# Patient Record
Sex: MALE | Race: BLACK OR AFRICAN AMERICAN | NOT HISPANIC OR LATINO | Employment: OTHER | URBAN - METROPOLITAN AREA
[De-identification: names, ages, dates, MRNs, and addresses within clinical notes are randomized per-mention and may not be internally consistent; named-entity substitution may affect disease eponyms.]

---

## 2018-04-29 ENCOUNTER — HOSPITAL ENCOUNTER (OUTPATIENT)
Dept: MEDSURG UNIT | Facility: HOSPITAL | Age: 48
End: 2018-04-30
Attending: INTERNAL MEDICINE | Admitting: INTERNAL MEDICINE

## 2018-04-29 LAB
% SATURATION: 99.6 % (ref 68–77)
ABS NEUT (OLG): 4.47 X10(3)/MCL (ref 2.1–9.2)
ALBUMIN SERPL-MCNC: 3.6 GM/DL (ref 3.4–5)
ALBUMIN/GLOB SERPL: 1 {RATIO}
ALP SERPL-CCNC: 150 UNIT/L (ref 50–136)
ALT SERPL-CCNC: 18 UNIT/L (ref 12–78)
APPEARANCE, UA: CLEAR
AST SERPL-CCNC: 17 UNIT/L (ref 15–37)
BACTERIA SPEC CULT: ABNORMAL /HPF
BASOPHILS # BLD AUTO: 0 X10(3)/MCL (ref 0–0.2)
BASOPHILS NFR BLD AUTO: 0 %
BILIRUB SERPL-MCNC: 0.5 MG/DL (ref 0.2–1)
BILIRUB UR QL STRIP: NEGATIVE
BILIRUBIN DIRECT+TOT PNL SERPL-MCNC: 0.1 MG/DL (ref 0–0.5)
BILIRUBIN DIRECT+TOT PNL SERPL-MCNC: 0.4 MG/DL (ref 0–0.8)
BNP BLD-MCNC: <5 PG/ML (ref 0–125)
BUN SERPL-MCNC: 7 MG/DL (ref 7–18)
CALCIUM SERPL-MCNC: 8.6 MG/DL (ref 8.5–10.1)
CHLORIDE SERPL-SCNC: 103 MMOL/L (ref 98–107)
CK MB SERPL-MCNC: 1.3 NG/ML (ref 0.5–3.6)
CK SERPL-CCNC: 273 UNIT/L (ref 39–308)
CO HGB VEN: 7.1 GM/DL
CO2 SERPL-SCNC: 22 MMOL/L (ref 21–32)
COLOR UR: YELLOW
CREAT SERPL-MCNC: 1.41 MG/DL (ref 0.7–1.3)
D BASE VENOUS: -6.8 (ref -2–3)
EOSINOPHIL # BLD AUTO: 0.1 X10(3)/MCL (ref 0–0.9)
EOSINOPHIL NFR BLD AUTO: 2 %
ERYTHROCYTE [DISTWIDTH] IN BLOOD BY AUTOMATED COUNT: 13.2 % (ref 11.5–17)
GLOBULIN SER-MCNC: 3.7 GM/DL (ref 2.4–3.5)
GLUCOSE (UA): ABNORMAL
GLUCOSE SERPL-MCNC: 641 MG/DL (ref 74–106)
HCO3 VENOUS: 17.5 MEQ/L (ref 22–26)
HCT VFR BLD AUTO: 46.4 % (ref 42–52)
HGB BLD-MCNC: 14.7 GM/DL (ref 14–18)
HGB UR QL STRIP: NEGATIVE
KETONES UR QL STRIP: ABNORMAL
LEUKOCYTE ESTERASE UR QL STRIP: NEGATIVE
LYMPHOCYTES # BLD AUTO: 1.2 X10(3)/MCL (ref 0.6–4.6)
LYMPHOCYTES NFR BLD AUTO: 19 %
Lab: ABNORMAL
MCH RBC QN AUTO: 30.9 PG (ref 27–31)
MCHC RBC AUTO-ENTMCNC: 31.7 GM/DL (ref 33–36)
MCV RBC AUTO: 97.5 FL (ref 80–94)
MET HGB VEN: 1.1 % (ref 0.4–1.5)
MONOCYTES # BLD AUTO: 0.6 X10(3)/MCL (ref 0.1–1.3)
MONOCYTES NFR BLD AUTO: 9 %
NEUTROPHILS # BLD AUTO: 4.47 X10(3)/MCL (ref 1.4–7.9)
NEUTROPHILS NFR BLD AUTO: 69 %
NITRITE UR QL STRIP: NEGATIVE
O2 HGB VENOUS: 90.3 % (ref 40–70)
PCO2 VENOUS: 31 MMHG (ref 35–45)
PH UR STRIP: 5.5 [PH] (ref 5–9)
PH VENOUS: 7.36 (ref 7.35–7.45)
PLATELET # BLD AUTO: 234 X10(3)/MCL (ref 130–400)
PMV BLD AUTO: 10.4 FL (ref 9.4–12.4)
PO2 VENOUS: 181 MMHG (ref 30–45)
POTASSIUM SERPL-SCNC: 3.8 MMOL/L (ref 3.5–5.1)
PROT SERPL-MCNC: 7.3 GM/DL (ref 6.4–8.2)
PROT UR QL STRIP: NEGATIVE
RBC # BLD AUTO: 4.76 X10(6)/MCL (ref 4.7–6.1)
RBC #/AREA URNS HPF: ABNORMAL /[HPF]
SAMPLE VEN: ABNORMAL
SITE VEN: ABNORMAL
SODIUM SERPL-SCNC: 135 MMOL/L (ref 136–145)
SP GR UR STRIP: 1.04 (ref 1–1.03)
SQUAMOUS EPITHELIAL, UA: ABNORMAL
THB VEN: 14 GM/DL (ref 13.5–18)
TREATMENT VEN: ABNORMAL
TROPONIN I SERPL-MCNC: <0.02 NG/ML (ref 0.02–0.49)
UROBILINOGEN UR STRIP-ACNC: 1
WBC # SPEC AUTO: 6.5 X10(3)/MCL (ref 4.5–11.5)
WBC #/AREA URNS HPF: ABNORMAL /HPF

## 2018-04-30 LAB
BUN SERPL-MCNC: 7 MG/DL (ref 7–18)
CALCIUM SERPL-MCNC: 8.2 MG/DL (ref 8.5–10.1)
CHLORIDE SERPL-SCNC: 115 MMOL/L (ref 98–107)
CHOLEST SERPL-MCNC: 255 MG/DL (ref 0–200)
CHOLEST/HDLC SERPL: 9.1 {RATIO} (ref 0–5)
CK MB SERPL-MCNC: 1 NG/ML (ref 0.5–3.6)
CK MB SERPL-MCNC: 1.2 NG/ML (ref 0.5–3.6)
CK SERPL-CCNC: 193 UNIT/L (ref 39–308)
CK SERPL-CCNC: 219 UNIT/L (ref 39–308)
CO2 SERPL-SCNC: 23 MMOL/L (ref 21–32)
CREAT SERPL-MCNC: 0.73 MG/DL (ref 0.7–1.3)
EST. AVERAGE GLUCOSE BLD GHB EST-MCNC: 298 MG/DL
EST. AVERAGE GLUCOSE BLD GHB EST-MCNC: 321 MG/DL
GLUCOSE SERPL-MCNC: 191 MG/DL (ref 74–106)
HBA1C MFR BLD: 12 % (ref 4.2–6.3)
HBA1C MFR BLD: 12.8 % (ref 4.2–6.3)
HDLC SERPL-MCNC: 28 MG/DL (ref 35–60)
LDLC SERPL CALC-MCNC: 200 MG/DL (ref 0–129)
POTASSIUM SERPL-SCNC: 3.3 MMOL/L (ref 3.5–5.1)
SODIUM SERPL-SCNC: 145 MMOL/L (ref 136–145)
TRIGL SERPL-MCNC: 133 MG/DL (ref 30–150)
TROPONIN I SERPL-MCNC: <0.02 NG/ML (ref 0.02–0.49)
TROPONIN I SERPL-MCNC: <0.02 NG/ML (ref 0.02–0.49)
VLDLC SERPL CALC-MCNC: 27 MG/DL

## 2020-01-23 ENCOUNTER — HISTORICAL (OUTPATIENT)
Dept: ADMINISTRATIVE | Facility: HOSPITAL | Age: 50
End: 2020-01-23

## 2021-04-05 ENCOUNTER — HISTORICAL (OUTPATIENT)
Dept: ADMINISTRATIVE | Facility: HOSPITAL | Age: 51
End: 2021-04-05

## 2021-04-28 ENCOUNTER — HISTORICAL (OUTPATIENT)
Dept: ADMINISTRATIVE | Facility: HOSPITAL | Age: 51
End: 2021-04-28

## 2021-04-28 LAB
APTT PPP: 30.2 SECOND(S) (ref 23.2–33.7)
INR PPP: 1 (ref 0–1.3)
PROTHROMBIN TIME: 12.8 SECOND(S) (ref 11.1–13.7)

## 2022-04-30 NOTE — DISCHARGE SUMMARY
Patient:   Cam Pang            MRN: [id number]            FIN: [id number]               Age:   48 years     Sex:  Male     :  1970   Associated Diagnoses:   Diabetes type 2, uncontrolled; HLD (hyperlipidemia); HTN (hypertension); Hyperglycemia; Non-compliance; Chest pain; Hypertension   Author:   Paulina Saleem MD      Results Review   General results   Most recent results   Discrete results only   2018 14:11 CDT      Total CK                  193 unit/L                             CK MB                     1.0 ng/mL                             Troponin-I                <0.02 ng/mL    2018 11:30 CDT      Blood Glucose, POC        226 mg/dL  HI    2018 11:24 CDT      POC CBG                   226 mg/dL  HI    2018 11:00 CDT      Blood Glucose Testing Reason              Routine                             Blood Glucose Stick Site  Finger                             Blood Glucose Interventions               Administered agent to decrease blood sugar                             Blood Glucose Source      Capillary    2018 5:45 CDT       Sodium Lvl                145 mmol/L                             Potassium Lvl             3.3 mmol/L  LOW                             Chloride                  115 mmol/L  HI                             CO2                       23.0 mmol/L                             Calcium Lvl               8.2 mg/dL  LOW                             Glucose Lvl               191 mg/dL  HI                             EAG                       321 mg/dL  NA                             BUN                       7.0 mg/dL                             Creatinine                0.73 mg/dL                             eGFR-AA                   >60 mL/min/1.73 m2  NA                             eGFR-DEL                  >60 mL/min/1.73 m2  NA                             Hgb A1c                   12.8 %  HI                             Chol                       255 mg/dL  HI                             HDL                       28 mg/dL  LOW                             Trig                      133 mg/dL                             LDL                       200 mg/dL  HI                             Chol/HDL                  9.1  HI                             VLDL                      27 mg/dL  NA                             Total CK                  219 unit/L                             CK MB                     1.2 ng/mL                             Troponin-I                <0.02 ng/mL    4/30/2018 5:27 CDT       Blood Glucose, POC        466 mg/dL  HI    4/30/2018 1:07 CDT       Blood Glucose, POC        314 mg/dL  HI    4/30/2018 0:03 CDT       EAG                       298 mg/dL  NA                             Hgb A1c                   12.0 %  HI           Discharge Information      Discharge Summary Information   Admitted  4/29/2018   Discharged  4/30/2018   Admitting physician     Ortiz HARPER, Justin RICE     Discharge diagnosis     Diabetes type 2, uncontrolled (WWY68-IE E11.65).     HLD (hyperlipidemia) (VIK18-ZI E78.5).     HTN (hypertension) (SJA68-AU I10).     Hyperglycemia (PNED [id number]).     Non-compliance (HGM38-WC Z91.19).        Physical Examination   Vital Signs   4/30/2018 14:29 CDT      Temperature Oral          36.7 DegC                             Temperature Oral (calculated)             98.06 DegF                             Peripheral Pulse Rate     84 bpm                             Heart Rate Monitored      114 bpm  HI                             Respiratory Rate          20 br/min                             SpO2                      95 %                             Oxygen Therapy            Room air                             Systolic Blood Pressure   148 mmHg  HI                             Diastolic Blood Pressure  87 mmHg                             Mean Arterial Pressure, Cuff              107 mmHg     General:   Alert and oriented, No acute distress.    Eye:  Pupils are equal, round and reactive to light.    HENT:  Normocephalic.    Neck:  Supple, Non-tender.    Respiratory:  Lungs are clear to auscultation, Respirations are non-labored.    Cardiovascular:  Normal rate, Regular rhythm.    Gastrointestinal:  Soft, Non-tender.    Musculoskeletal:  Normal range of motion, Normal strength.    Integumentary:  Warm.    Neurologic:  Alert, Oriented, No focal deficits.       Hospital Course   Hospital Course   Admitted from: from emergency department.     Admitting diagnosis: Chest pain (GQC66-QL R07.9), Diabetes type 2, uncontrolled (NSQ29-BR E11.65), Hyperglycemia (PNED [id number]), Hypertension (ICJ57-EC I10), Non-compliance (QFS45-LZ Z91.19).     Admission disposition: admit to monitored bed.     Length of stay: days  1.     Medical management.     38-year-old male with significant past medical history his HLD, HTN, uncontrolled type II diabetes mellitus, generalized anxiety admitted to hospitalist service on 4/29/2018 with high CBG, more than 500.  Patient reportedly was having blurry vision and headache which is common when his blood sugar was very high.  Patient reports being noncompliant and he was recently released from a psychiatric facility.  He does have history of schizophrenia.  Patient admitted for hyperglycemia/dehydration.  Initiated on IV fluids, insulin sliding scale and Lantus.  Medications optimized accordingly for high blood pressure and high cholesterol.Patient symptomatically improved.  His chest pain resolved.  Troponins remained negative.  CBGs improved with Lantus and sliding scale.  Since hemodynamically and symptomatically stable it was decided to discharge the patient home.  No other acute events overnight.  Hemoglobin A1c came back elevated at 12.8.  Patient is currently on metformin.  Will benefit from long-acting and short-acting insulin.  Initiated on Lantus 16 units at  bedtime and advised to increase by 2 units every day until CBGs consistently less than 110.  Insulin lispro to cover for meals 3 times a day.  To be taken 8 units 3 times a day.  Coreg/lisinopril for high blood pressure.  Lipitor for high cholesterol.  Continue psych meds.  She will need an outpatient follow-up with cardiology for outpatient stress test given his risk factors and atypical chest pain.  Appointment set up for the same.  The patient does not have a PCP.  Case management was consulted for the same.  PCP arranged.  All prescriptions given to the patient.  All treatment plans discussed with the patient.  Diabetic education/insulin administration education done.  He voiced understanding to everything explained.  He was scheduled follow-up appointments with PCP and cardiology         Discharge Plan   Discharge Summary Plan   Discharge Status: improved.     Discharge instructions given: to patient.     Discharge disposition: discharge to home.     Prescriptions: continue same medications, reviewed with patient, written and given to patient.     Course   Improving.     Progressing as expected.     Well controlled.     Education and Follow-up   Counseled: patient.     Discharge Planning: Cholesterol, Easy-to-Read, Hypertension, Easy-to-Read, Insulin Treatment for Diabetes, Correction Insulin, Diabetic Ketoacidosis, Diabetes Mellitus and Food, Diabetes Mellitus and Exercise, Blood Glucose Monitoring, Adult, Report to Emergency Department if symptoms return or worsen; CIS in 2 weeks for OP Stress test /Ischemic Eval 5/15/2018 01:00:00; Dr. Teresa 5/14/2018 11:00:00 (ph [phone]) Your appointment is Monday May 14, 2018 at 11:00 AM. Call if you need to reschedule or have any questions..     DC Time was 36 mnts

## 2022-04-30 NOTE — ED PROVIDER NOTES
"   Patient:   Cam Pang            MRN: [id number]            FIN: [id number]               Age:   48 years     Sex:  Male     :  1970   Associated Diagnoses:   Chest pain; Hypertension; Hyperglycemia   Author:   Jonathan HARPER, Radha Padilla      Basic Information   Time seen: Date & time 2018 16:11:00.   History source: Patient.   Arrival mode: Private vehicle.   History limitation: None.      History of Present Illness   The patient presents with 47 y/o male presents with hyperglycemia, polyuria, polydipsia. not taking metformin x 1 month. karina bob and     I, Dr. Castillo, assumed care of this patient at 1656.    47 y/o AAM with a history of DM, HTN, migraines and back pain, presents to the ED with complaints of hyperglycemia, polyuria, polydipsia and lower back pain, for the past month, constipation for 4 days, with chest tightness and "sticking" chest pain, a headache and SOB, today. Pt says he moved from [place], does not have a PCP in [place] and has not taken Metformin or BP medications for one month. He reports that his CBG while taking Metformin was typically 300 to 400, and his CBG today was 585. He states that his chest tightness and sticking pain occurs in 20 second episodes and is worse on exertion. Pt says he has been constipated, but is still able to pass gas. He denies any fever or cough. No changes in speech, strength or sensation.  The onset was 4  weeks ago.  The course/duration of symptoms is constant.  The blood sugar was 585 mg/dL, today and method of detection: home glucometer.  Risk factors consist of diabetes mellitus, non-compliance and hypertension.  Prior episodes: frequent.  Therapy today: none.  Associated symptoms: polyuria, polydipsia, headache and denies fever.        Review of Systems   Constitutional symptoms:  No fever,    Skin symptoms:  No rash,    Eye symptoms:  No recent vision problems,    ENMT symptoms:  No sore throat,    Respiratory " "symptoms:  Shortness of breath, No cough,    Cardiovascular symptoms:  Chest pain, diffuse, tightness, "sticking", No syncope,    Gastrointestinal symptoms:  Constipation, no abdominal pain, no nausea, no vomiting, no diarrhea.    Genitourinary symptoms:  No dysuria, no hematuria.    Musculoskeletal symptoms:  Back pain (left lower, not acute).   Neurologic symptoms:  Headache, no altered level of consciousness, no numbness, no tingling, no weakness.    Endocrine symptoms:  Polyuria, polydipsia, hyperglycemia.       Health Status   Allergies:    Allergic Reactions (Selected)  Severity Not Documented  Demerol HCl- No reactions were documented..   Medications:  (Selected)   Inpatient Medications  Ordered  Normal Saline Infusion 1,000 mL: 1,000 mL, 1,000 mL, IV, 1,000 mL/hr, start date 04/29/18 16:12:00 CDT  Normal Saline Infusion 1,000 mL: 1,000 mL, 1,000 mL, IV, 1,000 mL/hr, start date 04/29/18 16:24:00 CDT  Documented Medications  Documented  aspirin 81 mg oral Delayed Release (EC) tablet: 81 mg = 1 tab(s), Oral, Daily, # 30 tab(s), 0 Refill(s)  atorvastatin 40 mg oral tablet: 40 mg = 1 tab(s), Oral, Daily, # 30 tab(s), 0 Refill(s)  carvedilol 3.125 mg oral tablet: 3.125 mg = 1 tab(s), Oral, BID, # 60 tab(s), 0 Refill(s)  cloZAPine 100 mg oral tablet: 100 mg = 1 tab(s), Oral, At Bedtime, # 21 tab(s), 0 Refill(s)  clozapine 50 mg oral tablet: 50 mg = 1 tab(s), Oral, Daily, # 30 tab(s), 0 Refill(s)  lisinopril 20 mg oral tablet: 20 mg = 1 tab(s), Oral, Daily, # 30 tab(s), 0 Refill(s)  metformin 500 mg oral tablet: 500 mg = 1 tab(s), Oral, BID, with meals, # 180 tab(s), 0 Refill(s)  traZODone 100 mg oral tablet: Oral, At Bedtime, 0 Refill(s).      Past Medical/ Family/ Social History   Medical history:    Active  Back pain (1404128762)  Depression (122669934)  Diabetes ([id number])  Resolved  Arthritis ([id number]):  Resolved.  Bipolar disorder (79981524):  " Resolved.  Difficulty urinating ([id number]):  Resolved.  Hypercholesteremia (41705O8V-[id number]-D96I-03L0V1A86A81):  Resolved.  Hypertension (ZQ98G0M9--W5Q3-W3SR7AQ89G24):  Resolved.  Migraines ([id number]):  Resolved.  Reflux ([id number]):  Resolved.  Sciatica (53497299):  Resolved.  Sleep apnea (462771841):  Resolved.  Comments:  4/28/2015 CDT 11:11 CDT - Jocelin RANGEL, Dominique Carie  does not use machine  Smoker (116958082):  Resolved..   Surgical history:    Left Heart Cath w/COR Angio Ventriculography (None) performed by Ken HARPER, Hiren MONTGOMERY on 4/29/2015 at 45 Years.  Comments:  4/29/2015 09:21 - Riri RANGEL, Devante BASS  auto-populated from documented surgical case  Appendectomy; (CPT4 39215).  facial surgery.  repair of left arm fracture..   Family history:    No family history items have been selected or recorded..   Social history: Alcohol use: Occasionally, Tobacco use: Regularly.      Physical Examination               Vital Signs   Vital Signs   4/29/2018 17:38 CDT      Peripheral Pulse Rate     98 bpm                             Respiratory Rate          18 br/min                             Oxygen Therapy            Room air                             Systolic Blood Pressure   124 mmHg                             Diastolic Blood Pressure  71 mmHg                             Mean Arterial Pressure, Cuff              89 mmHg    4/29/2018 16:09 CDT      Temperature Oral          36.6 DegC                             Temperature Oral (calculated)             97.88 DegF                             Peripheral Pulse Rate     112 bpm  HI                             Respiratory Rate          18 br/min                             SpO2                      91 %  LOW                             Oxygen Therapy            Room air                             Systolic Blood Pressure   141 mmHg  HI                             Diastolic Blood  Pressure  84 mmHg  .   Measurements   4/29/2018 16:09 CDT      Weight Dosing             122 kg                             Weight Measured and Calculated in Lbs     268.96 lb                             Weight Estimated          122 kg                             Height/Length Dosing      185 cm                             Height/Length Estimated   185 cm                             Body Mass Index Estimated 35.65 kg/m2  .   Basic Oxygen Information   4/29/2018 17:38 CDT      Oxygen Therapy            Room air    4/29/2018 16:09 CDT      SpO2                      91 %  LOW                             Oxygen Therapy            Room air  .   General:  Alert, no acute distress.    Skin:  Warm, dry, intact, no pallor, no rash, normal for ethnicity.    Head:  Normocephalic, atraumatic.    Neck:  Supple, trachea midline.    Eye:  Extraocular movements are intact, normal conjunctiva.    Ears, nose, mouth and throat:  dry mucous membranes.   Cardiovascular:  Regular rate and rhythm, Normal peripheral perfusion, No edema.    Respiratory:  Lungs are clear to auscultation, respirations are non-labored, breath sounds are equal, Symmetrical chest wall expansion.    Gastrointestinal:  Soft, Nontender, Normal bowel sounds, Protuberant abdomen.    Back:  Normal range of motion, No costovertebral angle tenderness,    Musculoskeletal:  Normal ROM, No calf edema, asymmetry, erythema, warmth, tenderness to palpation or palpable cords appreciated bilaterally.    Neurological:  Alert and oriented to person, place, time, and situation, No focal neurological deficit observed, CN II-XII intact, normal sensory observed, normal motor observed, normal speech observed.    Psychiatric:  Cooperative.      Medical Decision Making   Differential Diagnosis::  Diabetes, diabetic ketoacidosis, hyperglycemia, ACS, and others.    Rationale:  48-year-old obese male with diabetes, hypertension, hyperlipidemia, noncompliant with medications, presents for  evaluation of polyuria, polydipsia and elevated blood glucose.  He has not had his medications for one month and does not have a PCP.  Initially hypertensive and tachycardic, IV fluids initiated.  Labs including urinalysis obtained as well.  I added VBG and screening cardiac workup as he is reporting intermittent chest pain with shortness of breath.  Currently he is chest pain-free.  EKG sinus rhythm without acute ischemic changes from previous tracings.  Reassess.   Documents reviewed:  Emergency department nurses' notes.   Orders  Launch Orders   Laboratory:  UA Total a reflex to culture (Order): Stat collect, Urine, 4/29/2018 16:12 CDT, Nurse collect, Print Label By Order Location  CMP (Order): Stat collect, 4/29/2018 16:12 CDT, Blood, Lab Collect, Print Label By Order Location, 4/29/2018 16:12 CDT  CBC w/ Auto Diff (Order): Now collect, 4/29/2018 16:12 CDT, Blood, Lab Collect, Print Label By Order Location, 4/29/2018 16:12 CDT  Pharmacy:  Normal Saline Infusion 1000 mL (Order): 1,000 mL, 1,000 mL, IV, 1,000 mL/hr, start date 4/29/2018 16:12 CDT, Launch Orders   Pharmacy:  Normal Saline Infusion 1000 mL (Order): 1,000 mL, 1,000 mL, IV, 1,000 mL/hr, start date 4/29/2018 16:24 CDT.    Electrocardiogram:  Time 4/29/2018 17:43:00, rate 97, normal sinus rhythm, no ectopy, normal MS & QRS intervals, EP Interp, T wave Inversion, III, , AVF.    Results review:  Lab results : Lab View   4/29/2018 17:30 CDT      Sample Daniel                venous                             Treatment Daniel             room air                             Site Daniel                  Venous                             pH Daniel                    7.360                             pO2 Dainel                   181.0 mmHg  HI                             pCO2 Daniel                  31.0 mmHg  LOW                             HCO3 Daniel                  17.5 mEq/L  LOW                             CO2 Totl Daniel              18.5 mEq/L  LOW                              D Base Daniel                -6.8  LOW                             % Sat Daniel                 99.6 %  HI                             THB Daniel                   14.0 gm/dL                             CO Hgb Daniel                7.1 gm/dL  NA                             Met Hgb Daniel               1.1 %                             O2 Hgb Daniel                90.3 %  HI                             Ion Ca Daniel                0.97 mmol/L  LOW                             Sodium Daniel                134 mmol/L  LOW                             Potassium Daniel             3.50 mmol/L  LOW                             Setting 1 Daniel             room air    4/29/2018 16:38 CDT      Sodium Lvl                135 mmol/L  LOW                             Potassium Lvl             3.8 mmol/L                             Chloride                  103 mmol/L                             CO2                       22.0 mmol/L                             Calcium Lvl               8.6 mg/dL                             Glucose Lvl               641 mg/dL  CRIT                             BUN                       7.0 mg/dL                             Creatinine                1.41 mg/dL  HI                             eGFR-AA                   >60 mL/min/1.73 m2  NA                             eGFR-DEL                  57 mL/min/1.73 m2  NA                             Bili Total                0.5 mg/dL                             Bili Direct               0.10 mg/dL                             Bili Indirect             0.40 mg/dL                             AST                       17 unit/L                             ALT                       18 unit/L                             Alk Phos                  150 unit/L  HI                             Total Protein             7.3 gm/dL                             Albumin Lvl               3.60 gm/dL                             Globulin                  3.70 gm/dL  HI                             A/G Ratio                  1.0  NA                             Troponin-I                <0.02 ng/mL                             WBC                       6.5 x10(3)/mcL                             RBC                       4.76 x10(6)/mcL                             Hgb                       14.7 gm/dL                             Hct                       46.4 %                             Platelet                  234 x10(3)/mcL                             MCV                       97.5 fL  HI                             MCH                       30.9 pg                             MCHC                      31.7 gm/dL  LOW                             RDW                       13.2 %                             MPV                       10.4 fL                             Abs Neut                  4.47 x10(3)/mcL                             Neutro Auto               69 %  NA                             Lymph Auto                19 %  NA                             Mono Auto                 9 %  NA                             Eos Auto                  2 %  NA                             Abs Eos                   0.1 x10(3)/mcL                             Basophil Auto             0 %  NA                             Abs Neutro                4.47 x10(3)/mcL                             Abs Lymph                 1.2 x10(3)/mcL                             Abs Mono                  0.6 x10(3)/mcL                             Abs Baso                  0.0 x10(3)/mcL    4/29/2018 16:25 CDT      UA Appear                 CLEAR                             UA Color                  YELLOW                             UA Spec Grav              1.038  HI                             UA Bili                   Negative                             UA pH                     5.5                             UA Urobilinogen           1.0                             UA Blood                  Negative                             UA Glucose                 3+                             UA Ketones                2+                             UA Protein                Negative                             UA Nitrite                Negative                             UA Leuk Est               Negative                             UA WBC                    NONE SEEN /HPF                             UA RBC                    NONE SEEN                             UA Bacteria               NONE SEEN /HPF                             UA Squam Epithelial       NONE SEEN  .   Chest X-Ray:  No acute disease process, interpretation by Emergency Physician.       Reexamination/ Reevaluation   Time: 4/29/2018 19:20:00 .   Course: improving.   Assessment: exam improved.   Notes: Patient's heart rate has improved with IV fluids.  He is still complaining of intermittent chest pressure.  Again he reports shortness of breath however this is not acute in nature.  He also has a headache with blurred vision at this time.  Headache described as diffuse pressure.  He admits he has not taken his blood pressure medication in over a month as he is out of this prescription as well. He was taking lisinopril.  Funduscopic exam within normal limits without obvious papilledema or retinal hemorrhage on limited exam.  Cranial nerves and neurologic exam normal.  I believe his symptoms are secondary to elevated blood pressure.  Labs remarkable for mild elevation in creatinine, negative troponin, ketones in the urine with a no anion gap.  Chest x-ray negative on my review for acute findings.  I will give him a dose of his home blood pressure medication and Fioricet for the headache.  He is currently chest pain-free.  Repeat glucose after 2 L of normal saline is 400.  I will give him a dose of insulin and contact hospital medicine for admission for serial troponins and further evaluation of his chest pain.      Impression and Plan   Diagnosis   Chest pain (CTK92-DW R07.9)   Hypertension (OIC00-RN I10)    Hyperglycemia (PNED [id number])      Calls-Consults   -  4/29/2018 19:20:00 , Hospital medicine.    Plan   Condition: Stable.    Disposition: Admit time  4/29/2018 19:29:00, Place in Observation Telemetry Unit, Ortiz HARPER, Justin RICE    Counseled: Patient, Family, Regarding diagnosis, Regarding diagnostic results, Regarding treatment plan, Patient indicated understanding of instructions.    Notes: I, Chavez Dubose, acted solely as a scribe for and in the presence of Dr. Castillo who performed the service. , I, Dr. Radha Castillo, personally evaluated and examined this patient and agree with the documentation as above. .

## 2022-04-30 NOTE — H&P
Patient:   Cam Pang            MRN: [id number]            FIN: [id number]               Age:   48 years     Sex:  Male     :  1970   Associated Diagnoses:   None   Author:   Justin Alcantar MD      Chief Complaint   2018 16:09 CDT      reports . has been out of DM meds x 1 month. frequent urination and thirst. CBG >600 in triage      History of Present Illness   This patient is 48 years of age and he is a poor historian comes into the hospital because he said he started to have sticking sensation in his chest bilaterally and that this routinely occurs when his blood sugar is greater than 500.  He says that his blood sugar was 580 at home and he developed blurred vision with a headache which is also common when his blood sugar is very high.  He admits to being noncompliant and has recently been released from psychiatric facility.  He has a history of schizophrenia.  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  He also has a known history of hypertension.  He was receiving metformin for diabetic control prior to the current presentation.  He also has a history of neck pain, cigarette smoking, depression, anxiety, bipolar and sleep apnea.  He states that his chest tightness and sticking pain occurs in 20 second episodes.  He denies shortness of breath;  His troponin is currently normal         Review of Systems   Constitutional:  Fatigue.    Eye:  Recent visual problem, Blurring, Incision becomes blood on his blood sugars not controlled.    Ear/Nose/Mouth/Throat:  Negative.    Respiratory:  Negative.    Cardiovascular:  Negative except as documented in history of present illness.    Gastrointestinal:  Nausea.    Genitourinary:  Negative.    Hematology/Lymphatics:  Negative.    Endocrine:  Polyuria.    Immunologic:  Negative.    Musculoskeletal:  Negative.    Integumentary:  Negative.    Neurologic:  Headache.    Psychiatric:  Schizophrenia.        Health Status   Allergies:    Allergies (1) Active Reaction  Demerol HCl None Documented     Current medications:  (Selected)   Inpatient Medications  Ordered  Xanax: 0.25 mg, form: Tab, Oral, q6hr PRN for anxiety, first dose 04/29/18 21:02:00 CDT  Documented Medications  Documented  aspirin 81 mg oral Delayed Release (EC) tablet: 81 mg = 1 tab(s), Oral, Daily, # 30 tab(s), 0 Refill(s)  atorvastatin 40 mg oral tablet: 40 mg = 1 tab(s), Oral, Daily, # 30 tab(s), 0 Refill(s)  carvedilol 3.125 mg oral tablet: 3.125 mg = 1 tab(s), Oral, BID, # 60 tab(s), 0 Refill(s)  cloZAPine 100 mg oral tablet: 100 mg = 1 tab(s), Oral, At Bedtime, # 21 tab(s), 0 Refill(s)  clozapine 50 mg oral tablet: 50 mg = 1 tab(s), Oral, Daily, # 30 tab(s), 0 Refill(s)  lisinopril 20 mg oral tablet: 20 mg = 1 tab(s), Oral, Daily, # 30 tab(s), 0 Refill(s)  metformin 500 mg oral tablet: 500 mg = 1 tab(s), Oral, BID, with meals, # 180 tab(s), 0 Refill(s)  traZODone 100 mg oral tablet: Oral, At Bedtime, 0 Refill(s)   Problem list:    Active Problems (5)  Back pain   Depression   Diabetes   Suicidal thoughts   Tobacco user         Histories   Past Medical History:    Active  Depression (781144551)  Back pain (4686672782)  Diabetes ([id number])  Resolved  Hypertension (TX94W8Q1--O5W0-T3XF3DH71T19):  Resolved.  Hypercholesteremia ([id number]-[id number]-V78C-36Q7J1F79T17):  Resolved.  Smoker (122659925):  Resolved.  Difficulty urinating ([id number]-[id number]):  Resolved.  Sciatica (92095262):  Resolved.  Sleep apnea (662218833):  Resolved.  Comments:  4/28/2015 CDT 11:11 CDT - Jocelin RANGEL, Dominique Darnell  does not use machine  Bipolar disorder (32676372):  Resolved.  Arthritis ([id number]-90O2-5L5U-TBP2M309C835):  Resolved.  Migraines ([id number]-[id number]):  Resolved.  Reflux (BF2C1R05-058J-5L51-P290-5B1A03787KM5):  Resolved.  Memory loss (87196445):  Resolved.  Schizophrenia (43911916):   Resolved.   Family History:    No family history items have been selected or recorded.   Social History        Social & Psychosocial Habits    Alcohol  09/22/2014 Risk Assessment: Denies Alcohol Use    04/29/2018  Use: Past    Comment: stopped drinking 9 mths ago - 04/29/2018 20:55 - Flako RANGEL, Sharonda    Substance Abuse  09/22/2014 Risk Assessment: Denies Substance Abuse    04/05/2017  Use: Past    Type: Cocaine    Comment: quit 3 years ago with 1 relapse last week. - 04/05/2017 16:11 - Chelly Singh RN    09/18/2017  Use: Current    Tobacco  04/29/2018  Use: Current every day smoker    Type: Cigarettes    Comment: 1/2 PPD - 04/29/2018 20:55 - Flako RANGEL, Sharonda  .        Physical Examination      Vital Signs (last 24 hrs)_____  Last Charted___________  Temp Oral     36.6 DegC  (APR 29 20:44)  Heart Rate Peripheral   86 bpm  (APR 29 20:44)  Resp Rate         16 br/min  (APR 29 20:18)  SBP      H 148mmHg  (APR 29 20:44)  DBP      H 95mmHg  (APR 29 20:44)  SpO2      97 %  (APR 29 20:44)     General:  Alert, no acute distress.  He is asking for a soda to drink  Skin:  Warm, dry, pink, intact.    Head:  Normocephalic, atraumatic.    Neck:  Supple, trachea midline.    Eye:  Extraocular movements are intact, normal conjunctiva.    Ears, nose, mouth and throat:  Oral mucosa moist.   Cardiovascular:  Regular rate and rhythm, No evidence of DVT.    Respiratory:  Lungs are clear to auscultation, respirations are non-labored, breath sounds are equal, Symmetrical chest wall expansion.    Gastrointestinal:  Soft, obese ,Nontender, Normal bowel sounds, Protuberant abdomen.    Back:  Normal range of motion, No costovertebral angle tenderness,    Musculoskeletal:  Normal ROM.   Neurological:  Alert and oriented to person, place, time, and situation, No focal neurological deficit observed.    Psychiatric:  Cooperative.          Review / Management   Results review:     Labs (Last four charted values)  Glucose              C  641 (APR 29) .    Laboratory Results   Last 5 Days Lab Results : PowerNote Discrete Results   4/29/2018 21:21 CDT      Blood Glucose, POC        273 mg/dL  HI    4/29/2018 19:37 CDT      BNP                       <5 pg/mL    4/29/2018 18:57 CDT      POC CBG                   406 mg/dL  HI    4/29/2018 17:30 CDT      Sample Daniel                venous                             Treatment Daniel             room air                             Site Daniel                  Venous                             pH Daniel                    7.360                             pO2 Daniel                   181.0 mmHg  HI                             pCO2 Daniel                  31.0 mmHg  LOW                             HCO3 Daniel                  17.5 mEq/L  LOW                             CO2 Totl Daniel              18.5 mEq/L  LOW                             D Base Daniel                -6.8  LOW                             % Sat Daniel                 99.6 %  HI                             THB Daniel                   14.0 gm/dL                             CO Hgb Daniel                7.1 gm/dL  NA                             Met Hgb Daniel               1.1 %                             O2 Hgb Daniel                90.3 %  HI                             Ion Ca Daniel                0.97 mmol/L  LOW                             Sodium Daniel                134 mmol/L  LOW                             Potassium Daniel             3.50 mmol/L  LOW                             Setting 1 Daniel             room air    4/29/2018 16:38 CDT      WBC                       6.5 x10(3)/mcL                             RBC                       4.76 x10(6)/mcL                             Hgb                       14.7 gm/dL                             Hct                       46.4 %                             Platelet                  234 x10(3)/mcL                             MCV                       97.5 fL  HI                             MCH                       30.9 pg                              MCHC                      31.7 gm/dL  LOW                             RDW                       13.2 %                             MPV                       10.4 fL                             Abs Neut                  4.47 x10(3)/mcL                             Neutro Auto               69 %  NA                             Lymph Auto                19 %  NA                             Mono Auto                 9 %  NA                             Eos Auto                  2 %  NA                             Abs Eos                   0.1 x10(3)/mcL                             Basophil Auto             0 %  NA                             Abs Neutro                4.47 x10(3)/mcL                             Abs Lymph                 1.2 x10(3)/mcL                             Abs Mono                  0.6 x10(3)/mcL                             Abs Baso                  0.0 x10(3)/mcL                             Sodium Lvl                135 mmol/L  LOW                             Potassium Lvl             3.8 mmol/L                             Chloride                  103 mmol/L                             CO2                       22.0 mmol/L                             Calcium Lvl               8.6 mg/dL                             Glucose Lvl               641 mg/dL  CRIT                             BUN                       7.0 mg/dL                             Creatinine                1.41 mg/dL  HI                             eGFR-AA                   >60 mL/min/1.73 m2  NA                             eGFR-DEL                  57 mL/min/1.73 m2  NA                             Bili Total                0.5 mg/dL                             Bili Direct               0.10 mg/dL                             Bili Indirect             0.40 mg/dL                             AST                       17 unit/L                             ALT                       18 unit/L                             Alk  Phos                  150 unit/L  HI                             Total Protein             7.3 gm/dL                             Albumin Lvl               3.60 gm/dL                             Globulin                  3.70 gm/dL  HI                             A/G Ratio                 1.0  NA                             Troponin-I                <0.02 ng/mL    4/29/2018 16:25 CDT      UA Appear                 CLEAR                             UA Color                  YELLOW                             UA Spec Grav              1.038  HI                             UA Bili                   Negative                             UA pH                     5.5                             UA Urobilinogen           1.0                             UA Blood                  Negative                             UA Glucose                3+                             UA Ketones                2+                             UA Protein                Negative                             UA Nitrite                Negative                             UA Leuk Est               Negative                             UA WBC                    NONE SEEN /HPF                             UA RBC                    NONE SEEN                             UA Bacteria               NONE SEEN /HPF                             UA Squam Epithelial       NONE SEEN    4/29/2018 16:14 CDT      POC CBG                   >600 mg/dL  CRIT        Chest x-ray results        Consistent with: The chest x-ray is underpenetrated, portable film, it looks essentially normal      Impression and Plan   ASSESSMENT:  Uncontrolled diabetes associated with atypical chest pain  Mild metabolic acidosis  Elevated blood pressure    Other medical problems include:  Bipolar-schizophrenia with anxiety, depression  Noncompliance with medication  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

## 2023-03-29 ENCOUNTER — HOSPITAL ENCOUNTER (EMERGENCY)
Facility: HOSPITAL | Age: 53
Discharge: HOME | End: 2023-03-29
Attending: STUDENT IN AN ORGANIZED HEALTH CARE EDUCATION/TRAINING PROGRAM
Payer: MEDICARE

## 2023-03-29 VITALS
SYSTOLIC BLOOD PRESSURE: 148 MMHG | HEART RATE: 72 BPM | RESPIRATION RATE: 16 BRPM | OXYGEN SATURATION: 97 % | TEMPERATURE: 98 F | DIASTOLIC BLOOD PRESSURE: 97 MMHG

## 2023-03-29 DIAGNOSIS — M25.522 LEFT ELBOW PAIN: Primary | ICD-10-CM

## 2023-03-29 DIAGNOSIS — I95.1 ORTHOSTATIC SYNCOPE: ICD-10-CM

## 2023-03-29 DIAGNOSIS — S50.02XA CONTUSION OF LEFT ELBOW, INITIAL ENCOUNTER: ICD-10-CM

## 2023-03-29 LAB
ALBUMIN SERPL-MCNC: 4.2 G/DL (ref 3.5–5)
ALBUMIN/GLOB SERPL: 1.2 RATIO (ref 1.1–2)
ALP SERPL-CCNC: 86 UNIT/L (ref 40–150)
ALT SERPL-CCNC: 15 UNIT/L (ref 0–55)
AST SERPL-CCNC: 14 UNIT/L (ref 5–34)
BASOPHILS # BLD AUTO: 0.04 X10(3)/MCL (ref 0–0.2)
BASOPHILS NFR BLD AUTO: 0.4 %
BILIRUBIN DIRECT+TOT PNL SERPL-MCNC: 0.3 MG/DL
BUN SERPL-MCNC: 18.5 MG/DL (ref 8.4–25.7)
CALCIUM SERPL-MCNC: 9.4 MG/DL (ref 8.4–10.2)
CHLORIDE SERPL-SCNC: 106 MMOL/L (ref 98–107)
CO2 SERPL-SCNC: 26 MMOL/L (ref 22–29)
CREAT SERPL-MCNC: 1.2 MG/DL (ref 0.73–1.18)
EOSINOPHIL # BLD AUTO: 0.14 X10(3)/MCL (ref 0–0.9)
EOSINOPHIL NFR BLD AUTO: 1.2 %
ERYTHROCYTE [DISTWIDTH] IN BLOOD BY AUTOMATED COUNT: 12.1 % (ref 11.5–17)
GFR SERPLBLD CREATININE-BSD FMLA CKD-EPI: >60 MLS/MIN/1.73/M2
GLOBULIN SER-MCNC: 3.4 GM/DL (ref 2.4–3.5)
GLUCOSE SERPL-MCNC: 117 MG/DL (ref 74–100)
HCT VFR BLD AUTO: 45.6 % (ref 42–52)
HGB BLD-MCNC: 14.4 G/DL (ref 14–18)
IMM GRANULOCYTES # BLD AUTO: 0.04 X10(3)/MCL (ref 0–0.04)
IMM GRANULOCYTES NFR BLD AUTO: 0.4 %
LYMPHOCYTES # BLD AUTO: 1.91 X10(3)/MCL (ref 0.6–4.6)
LYMPHOCYTES NFR BLD AUTO: 17 %
MAGNESIUM SERPL-MCNC: 2.3 MG/DL (ref 1.6–2.6)
MCH RBC QN AUTO: 30.1 PG (ref 27–31)
MCHC RBC AUTO-ENTMCNC: 31.6 G/DL (ref 33–36)
MCV RBC AUTO: 95.2 FL (ref 80–94)
MONOCYTES # BLD AUTO: 0.84 X10(3)/MCL (ref 0.1–1.3)
MONOCYTES NFR BLD AUTO: 7.5 %
NEUTROPHILS # BLD AUTO: 8.25 X10(3)/MCL (ref 2.1–9.2)
NEUTROPHILS NFR BLD AUTO: 73.5 %
NRBC BLD AUTO-RTO: 0 %
PHOSPHATE SERPL-MCNC: 3.2 MG/DL (ref 2.3–4.7)
PLATELET # BLD AUTO: 256 X10(3)/MCL (ref 130–400)
PMV BLD AUTO: 9.5 FL (ref 7.4–10.4)
POCT GLUCOSE: 113 MG/DL (ref 70–110)
POTASSIUM SERPL-SCNC: 4.3 MMOL/L (ref 3.5–5.1)
PROT SERPL-MCNC: 7.6 GM/DL (ref 6.4–8.3)
RBC # BLD AUTO: 4.79 X10(6)/MCL (ref 4.7–6.1)
SODIUM SERPL-SCNC: 141 MMOL/L (ref 136–145)
TROPONIN I SERPL-MCNC: <0.01 NG/ML (ref 0–0.04)
WBC # SPEC AUTO: 11.2 X10(3)/MCL (ref 4.5–11.5)

## 2023-03-29 PROCEDURE — 84100 ASSAY OF PHOSPHORUS: CPT | Performed by: STUDENT IN AN ORGANIZED HEALTH CARE EDUCATION/TRAINING PROGRAM

## 2023-03-29 PROCEDURE — 96360 HYDRATION IV INFUSION INIT: CPT

## 2023-03-29 PROCEDURE — 99285 EMERGENCY DEPT VISIT HI MDM: CPT | Mod: 25

## 2023-03-29 PROCEDURE — 84484 ASSAY OF TROPONIN QUANT: CPT | Performed by: STUDENT IN AN ORGANIZED HEALTH CARE EDUCATION/TRAINING PROGRAM

## 2023-03-29 PROCEDURE — 80053 COMPREHEN METABOLIC PANEL: CPT | Performed by: STUDENT IN AN ORGANIZED HEALTH CARE EDUCATION/TRAINING PROGRAM

## 2023-03-29 PROCEDURE — 93005 ELECTROCARDIOGRAM TRACING: CPT

## 2023-03-29 PROCEDURE — 83735 ASSAY OF MAGNESIUM: CPT | Performed by: STUDENT IN AN ORGANIZED HEALTH CARE EDUCATION/TRAINING PROGRAM

## 2023-03-29 PROCEDURE — 82962 GLUCOSE BLOOD TEST: CPT

## 2023-03-29 PROCEDURE — 85025 COMPLETE CBC W/AUTO DIFF WBC: CPT | Performed by: STUDENT IN AN ORGANIZED HEALTH CARE EDUCATION/TRAINING PROGRAM

## 2023-03-29 PROCEDURE — 25000003 PHARM REV CODE 250: Performed by: STUDENT IN AN ORGANIZED HEALTH CARE EDUCATION/TRAINING PROGRAM

## 2023-03-29 RX ORDER — HYDROCODONE BITARTRATE AND ACETAMINOPHEN 10; 325 MG/1; MG/1
1 TABLET ORAL EVERY 6 HOURS PRN
Qty: 10 TABLET | Refills: 0 | Status: SHIPPED | OUTPATIENT
Start: 2023-03-29 | End: 2023-05-29

## 2023-03-29 RX ORDER — HYDROCODONE BITARTRATE AND ACETAMINOPHEN 10; 325 MG/1; MG/1
1 TABLET ORAL
Status: COMPLETED | OUTPATIENT
Start: 2023-03-29 | End: 2023-03-29

## 2023-03-29 RX ADMIN — HYDROCODONE BITARTRATE AND ACETAMINOPHEN 1 TABLET: 10; 325 TABLET ORAL at 07:03

## 2023-03-29 RX ADMIN — SODIUM CHLORIDE 1000 ML: 9 INJECTION, SOLUTION INTRAVENOUS at 06:03

## 2023-03-30 NOTE — ED PROVIDER NOTES
Encounter Date: 3/29/2023       History     Chief Complaint   Patient presents with    Loss of Consciousness     PT REPORTS SYNCOPE EPISODE PTA.  HIT LT ELBOW ON WAY DOWN.  DENIES HEAD INJURY. REPORTS BLURRED VISION, DIZZINESS AND CP/SOB PRIOR TO INCIDENT.   EKG OBTAINED. NO PAIN AT PRESENT.      53-year-old male presents to ED for left elbow pain and blacking out.  States he was ambulating when he became very lightheaded.  States it is progressed to ultimately loss of vision and a brief blackout.  When he fell he struck his left elbow which has been painful since.  Reports discomfort of the left elbow region with some swelling as well as the tricep region of the left upper extremity.  Denies any collarbone or shoulder pain, reports intact distal sensation and strength.  No neck or back pain.  Denies any seizure-like activity.  No point chest pain or pressure, no nausea or vomiting. No other complaints or concerns at this time.     Review of patient's allergies indicates:   Allergen Reactions    Meperidine Itching and Nausea And Vomiting     Past Medical History:   Diagnosis Date    Coronary artery disease     Depression     Diabetes mellitus     Schizophrenia, unspecified      No past surgical history on file.  No family history on file.     Review of Systems   Constitutional:  Negative for chills and fever.   HENT:  Negative for congestion, rhinorrhea and sore throat.    Eyes:  Negative for pain, discharge and itching.   Respiratory:  Negative for chest tightness and shortness of breath.    Cardiovascular:  Negative for chest pain and palpitations.   Gastrointestinal:  Negative for abdominal pain, nausea and vomiting.   Genitourinary:  Negative for dysuria and hematuria.   Musculoskeletal:  Positive for arthralgias. Negative for myalgias and neck pain.        Left elbow pain and swelling   Skin:  Negative for color change and rash.   Neurological:  Positive for syncope. Negative for dizziness, weakness and  headaches.   Psychiatric/Behavioral:  Negative for confusion. The patient is not hyperactive.      Physical Exam     Initial Vitals [03/29/23 1714]   BP Pulse Resp Temp SpO2   (!) 190/103 84 16 98.1 °F (36.7 °C) 95 %      MAP       --         Physical Exam    Constitutional: He appears well-developed and well-nourished. He is not diaphoretic. No distress.   HENT:   Head: Normocephalic and atraumatic.   Eyes: Conjunctivae and EOM are normal. Pupils are equal, round, and reactive to light.   Neck: Neck supple. No tracheal deviation present.   Normal range of motion.  Cardiovascular:  Normal rate, regular rhythm and normal heart sounds.           Pulmonary/Chest: Breath sounds normal. No respiratory distress.   Abdominal: Abdomen is soft. There is no abdominal tenderness. There is no rebound.   Musculoskeletal:         General: No tenderness. Normal range of motion.        Arms:       Cervical back: Normal range of motion and neck supple. No spasms, tenderness or bony tenderness.      Thoracic back: No spasms, tenderness or bony tenderness.      Lumbar back: No spasms, tenderness or bony tenderness.     Neurological: He is alert and oriented to person, place, and time. He has normal strength. GCS score is 15. GCS eye subscore is 4. GCS verbal subscore is 5. GCS motor subscore is 6.   Skin: Skin is warm and dry. Capillary refill takes less than 2 seconds. No rash noted.   Psychiatric: He has a normal mood and affect. His behavior is normal. Judgment and thought content normal.       ED Course   Procedures  Labs Reviewed   COMPREHENSIVE METABOLIC PANEL - Abnormal; Notable for the following components:       Result Value    Glucose Level 117 (*)     Creatinine 1.20 (*)     All other components within normal limits   CBC WITH DIFFERENTIAL - Abnormal; Notable for the following components:    MCV 95.2 (*)     MCHC 31.6 (*)     All other components within normal limits   POCT GLUCOSE - Abnormal; Notable for the following  components:    POCT Glucose 113 (*)     All other components within normal limits   TROPONIN I - Normal   MAGNESIUM - Normal   PHOSPHORUS - Normal   CBC W/ AUTO DIFFERENTIAL    Narrative:     The following orders were created for panel order CBC auto differential.  Procedure                               Abnormality         Status                     ---------                               -----------         ------                     CBC with Differential[[id number]]        Abnormal            Final result                 Please view results for these tests on the individual orders.   EXTRA TUBES    Narrative:     The following orders were created for panel order EXTRA TUBES.  Procedure                               Abnormality         Status                     ---------                               -----------         ------                     Light Blue Top Hold[[id number]]                              In process                 Red Top Hold[[id number]]                                     In process                 Lavender Top Hold[[id number]]                                In process                   Please view results for these tests on the individual orders.   LIGHT BLUE TOP HOLD   RED TOP HOLD   LAVENDER TOP HOLD     EKG Readings: (Independently Interpreted)   Initial Reading: No STEMI. Rhythm: Normal Sinus Rhythm. Ectopy: No Ectopy. Conduction: Normal. Axis: Normal. Clinical Impression: Normal Sinus Rhythm   ECG Results              EKG 12-lead (Syncope) Age >50 (Final result)  Result time 03/30/23 15:50:31      Final result by Interface, Lab In [facility] (03/30/23 15:50:31)                   Narrative:    Test Reason : R55,    Vent. Rate : 081 BPM     Atrial Rate : 081 BPM     P-R Int : 192 ms          QRS Dur : 078 ms      QT Int : 380 ms       P-R-T Axes : 080 022 040 degrees     QTc Int : 441 ms    Normal sinus rhythm  Normal ECG  No previous ECGs available  Confirmed by Rajiv Gallegos MD ([id number]) on  3/30/2023 3:50:21 PM    Referred By:             Confirmed By:Rajiv Gallegos MD                                  Imaging Results              CT Head Without Contrast (Final result)  Result time 03/29/23 19:24:03      Final result by Edi Eduardo MD (03/29/23 19:24:03)                   Impression:      1. Chronic microangiopathic ischemia.    2. Atrophy.      Electronically signed by: Edi Eduardo  Date:    03/29/2023  Time:    19:24               Narrative:    EXAMINATION:  CT HEAD WITHOUT CONTRAST    CLINICAL HISTORY:  Dizziness, persistent/recurrent, cardiac or vascular cause suspected;    TECHNIQUE:  Sequential axial images were performed of the brain without contrast.    Dose product length of 1053 mGycm. Automated exposure control was utilized to minimize radiation dose.    COMPARISON:  None available.    FINDINGS:  There is no intracranial mass effect, midline shift, hydrocephalus or hemorrhage. There is no sulcal effacement or low attenuation changes to suggest recent large vessel territory infarction. There is no acute extra axial fluid collection.  There is old repaired fracture of the left orbital floor.  There is mucoperiosteal thickening of bilateral frontal and the left ethmoidal air cells.  Otherwise, visualized paranasal sinuses are clear without mucosal thickening, polypoidal abnormality or air-fluid levels. Mastoid air cells aeration is optimal.                                       X-Ray Humerus 2 View Left (Final result)  Result time 03/29/23 19:03:23      Final result by Edi Eduardo MD (03/29/23 19:03:23)                   Impression:      No acute osseous abnormality identified.      Electronically signed by: Edi Eduardo  Date:    03/29/2023  Time:    19:03               Narrative:    EXAMINATION:  XR HUMERUS 2 VIEW LEFT    CLINICAL HISTORY:  Pain, unspecified    TECHNIQUE:  Two-view    COMPARISON:  None available    FINDINGS:  Articular surfaces alignment is preserved.  No acute  fracture, dislocation or significant arthritic changes identified.                                       X-Ray Elbow Complete Left (Final result)  Result time 03/29/23 19:06:32      Final result by Edi Eduardo MD (03/29/23 19:06:32)                   Impression:      No acute osseous abnormality identified.      Electronically signed by: Edi Eduardo  Date:    03/29/2023  Time:    19:06               Narrative:    EXAMINATION:  XR ELBOW COMPLETE 3 VIEW LEFT    CLINICAL HISTORY:  Pain, unspecified    TECHNIQUE:  Three-view    COMPARISON:  Right elbow radiographs July 29, 2013.    FINDINGS:  There is posterior aspect soft tissue calcification at the level of the distal humerus.  About similar level and about similar size calcification was also present right elbow radiographs performed July 29, 2013.  These may be developmental or acquired.  Articular surfaces alignment is preserved.  No acute fracture or dislocation identified.                                       X-Ray Chest AP Portable (Final result)  Result time 03/29/23 18:24:53      Final result by Edi Eduardo MD (03/29/23 18:24:53)                   Impression:      NO ACUTE CARDIOPULMONARY PROCESS IDENTIFIED.      Electronically signed by: Edi Eduardo  Date:    03/29/2023  Time:    18:24               Narrative:    EXAMINATION:  XR CHEST AP PORTABLE    CLINICAL HISTORY:  Chest Pain;    TECHNIQUE:  One view    COMPARISON:  March 22, 2019.    FINDINGS:  Cardiopericardial silhouette appearance is similar.  Lungs are without dense focal or segmental consolidation, congestive process, pleural effusions or pneumothorax.                                       Medications   sodium chloride 0.9% bolus 1,000 mL 1,000 mL (0 mLs Intravenous Stopped 3/29/23 1912)   HYDROcodone-acetaminophen  mg per tablet 1 tablet (1 tablet Oral Given 3/29/23 1904)     Medical Decision Making:   History:   Old Medical Records: I decided to obtain old medical records.  Initial  Assessment:   Syncope with left elbow pain  Differential Diagnosis:   Elbow dislocation  Fracture  Sprain  Contusion  Hemarthrosis  Resolved arrhythmia  Intracranial hemorrhage  PE  Vasovagal syncope  Clinical Tests:   Lab Tests: Reviewed and Ordered  Radiological Study: Ordered and Reviewed  Medical Tests: Reviewed and Ordered  ED Management:  Patient was comfortably sitting up in department.  No acute distress.  Significant other bedside.  Story appears consistent with orthostatic syncope.  Patient did injure his left elbow which has localized swelling.  has full range of motion of the left elbow.  X-ray negative fracture.  Elbow stable and does not appear to be acutely dislocated or reduced.  Neurovascularly intact distally.  cardiac enzymes, chest x-ray, laboratory analysis with electrolytes all grossly unremarkable.  Observed for period of time without reoccurrence of symptoms. was placed in an arm sling and will follow up closely in the outpatient setting.  Prescribed pain medication. Does not mandate admission for syncope at this time.  Is stable for discharge.  Provided strict return precautions and released. (Bijal)                         Clinical Impression:   Final diagnoses:  [M25.522] Left elbow pain (Primary)  [S50.02XA] Contusion of left elbow, initial encounter  [I95.1] Orthostatic syncope        ED Disposition Condition    Discharge Stable          ED Prescriptions       Medication Sig Dispense Start Date End Date Auth. Provider    HYDROcodone-acetaminophen (NORCO)  mg per tablet Take 1 tablet by mouth every 6 (six) hours as needed for Pain. 10 tablet 3/29/2023 -- Sharif Appiah MD          Follow-up Information       Follow up With Specialties Details Why Contact Info    Derick Joiner MD Family Medicine Schedule an appointment as soon as possible for a visit in 3 days  [facility] [address] [facility] [address]  [phone]      [facility] Emergency Medicine  As  needed, If symptoms worsen [address] [facility] [address]  [phone]             Sharif Appiah MD  04/01/23 1946

## 2023-04-03 ENCOUNTER — TELEPHONE (OUTPATIENT)
Dept: ORTHOPEDICS | Facility: CLINIC | Age: 53
End: 2023-04-03
Payer: MEDICARE

## 2023-04-03 DIAGNOSIS — M51.36 DDD (DEGENERATIVE DISC DISEASE), LUMBAR: Primary | ICD-10-CM

## 2023-04-10 ENCOUNTER — HOSPITAL ENCOUNTER (OUTPATIENT)
Dept: RADIOLOGY | Facility: CLINIC | Age: 53
Discharge: HOME | End: 2023-04-10
Attending: ORTHOPAEDIC SURGERY
Payer: MEDICARE

## 2023-04-10 ENCOUNTER — OFFICE VISIT (OUTPATIENT)
Dept: ORTHOPEDIC SURGERY | Facility: CLINIC | Age: 53
End: 2023-04-10
Payer: MEDICARE

## 2023-04-10 VITALS — WEIGHT: 275 LBS | HEIGHT: 73 IN | BODY MASS INDEX: 36.45 KG/M2

## 2023-04-10 DIAGNOSIS — M54.9 DORSALGIA, UNSPECIFIED: ICD-10-CM

## 2023-04-10 DIAGNOSIS — M54.16 LUMBAR RADICULOPATHY: ICD-10-CM

## 2023-04-10 DIAGNOSIS — M51.36 DDD (DEGENERATIVE DISC DISEASE), LUMBAR: ICD-10-CM

## 2023-04-10 DIAGNOSIS — M47.816 LUMBAR SPONDYLOSIS: Primary | ICD-10-CM

## 2023-04-10 PROCEDURE — 72110 XR LUMBAR SPINE AP AND LAT WITH FLEX/EXT: ICD-10-PCS | Mod: ,,, | Performed by: ORTHOPAEDIC SURGERY

## 2023-04-10 PROCEDURE — 1160F PR REVIEW ALL MEDS BY PRESCRIBER/CLIN PHARMACIST DOCUMENTED: ICD-10-PCS | Mod: CPTII,S$GLB,, | Performed by: ORTHOPAEDIC SURGERY

## 2023-04-10 PROCEDURE — 3008F BODY MASS INDEX DOCD: CPT | Mod: CPTII,S$GLB,, | Performed by: ORTHOPAEDIC SURGERY

## 2023-04-10 PROCEDURE — 99204 PR OFFICE/OUTPT VISIT, NEW, LEVL IV, 45-59 MIN: ICD-10-PCS | Mod: S$GLB,,, | Performed by: ORTHOPAEDIC SURGERY

## 2023-04-10 PROCEDURE — 3008F PR BODY MASS INDEX (BMI) DOCUMENTED: ICD-10-PCS | Mod: CPTII,S$GLB,, | Performed by: ORTHOPAEDIC SURGERY

## 2023-04-10 PROCEDURE — 99204 OFFICE O/P NEW MOD 45 MIN: CPT | Mod: S$GLB,,, | Performed by: ORTHOPAEDIC SURGERY

## 2023-04-10 PROCEDURE — 1159F PR MEDICATION LIST DOCUMENTED IN MEDICAL RECORD: ICD-10-PCS | Mod: CPTII,S$GLB,, | Performed by: ORTHOPAEDIC SURGERY

## 2023-04-10 PROCEDURE — 72110 X-RAY EXAM L-2 SPINE 4/>VWS: CPT | Mod: ,,, | Performed by: ORTHOPAEDIC SURGERY

## 2023-04-10 PROCEDURE — 1160F RVW MEDS BY RX/DR IN RCRD: CPT | Mod: CPTII,S$GLB,, | Performed by: ORTHOPAEDIC SURGERY

## 2023-04-10 PROCEDURE — 1159F MED LIST DOCD IN RCRD: CPT | Mod: CPTII,S$GLB,, | Performed by: ORTHOPAEDIC SURGERY

## 2023-04-10 RX ORDER — BUSPIRONE HYDROCHLORIDE 15 MG/1
1 TABLET ORAL EVERY MORNING
COMMUNITY
Start: 2022-11-02 | End: 2023-05-15

## 2023-04-10 RX ORDER — NAFTIFINE HYDROCHLORIDE 2 G/100G
GEL TOPICAL
COMMUNITY
Start: 2023-01-25

## 2023-04-10 RX ORDER — SPIRONOLACTONE 25 MG/1
25 TABLET ORAL DAILY
COMMUNITY
Start: 2023-02-22

## 2023-04-10 RX ORDER — FLUOXETINE HYDROCHLORIDE 20 MG/1
60 CAPSULE ORAL EVERY MORNING
COMMUNITY
Start: 2023-02-22

## 2023-04-10 RX ORDER — MELOXICAM 15 MG/1
15 TABLET ORAL
COMMUNITY
Start: 2022-11-02 | End: 2023-05-15

## 2023-04-10 RX ORDER — PREGABALIN 100 MG/1
200 CAPSULE ORAL 2 TIMES DAILY
COMMUNITY
Start: 2023-03-01 | End: 2023-05-15

## 2023-04-10 RX ORDER — LISINOPRIL 40 MG/1
40 TABLET ORAL DAILY
COMMUNITY
Start: 2022-11-14

## 2023-04-10 RX ORDER — INSULIN LISPRO 100 [IU]/ML
INJECTION, SOLUTION INTRAVENOUS; SUBCUTANEOUS
COMMUNITY
Start: 2022-11-16 | End: 2023-05-15

## 2023-04-10 RX ORDER — CICLOPIROX 80 MG/ML
1 SOLUTION TOPICAL NIGHTLY
COMMUNITY
Start: 2023-02-22

## 2023-04-10 RX ORDER — SPIRONOLACTONE 25 MG/1
1 TABLET ORAL EVERY MORNING
COMMUNITY
Start: 2022-11-02 | End: 2023-05-15

## 2023-04-10 RX ORDER — FLUTICASONE PROPIONATE 50 MCG
1 SPRAY, SUSPENSION (ML) NASAL
COMMUNITY
Start: 2022-11-14

## 2023-04-10 RX ORDER — LISINOPRIL 40 MG/1
40 TABLET ORAL
COMMUNITY
Start: 2022-11-14 | End: 2023-05-13

## 2023-04-10 RX ORDER — BUTALBITAL, ASPIRIN, AND CAFFEINE 325; 50; 40 MG/1; MG/1; MG/1
2 CAPSULE ORAL 2 TIMES DAILY
COMMUNITY
Start: 2023-04-04

## 2023-04-10 RX ORDER — ZOLPIDEM TARTRATE 10 MG/1
10 TABLET ORAL NIGHTLY PRN
COMMUNITY
Start: 2023-02-22 | End: 2023-05-15

## 2023-04-10 RX ORDER — LISINOPRIL 20 MG/1
20 TABLET ORAL
COMMUNITY
Start: 2022-11-02 | End: 2023-05-15

## 2023-04-10 RX ORDER — HYDROXYZINE PAMOATE 50 MG/1
50 CAPSULE ORAL 2 TIMES DAILY PRN
COMMUNITY
Start: 2023-02-22 | End: 2023-05-15

## 2023-04-10 RX ORDER — FLUTICASONE PROPIONATE 50 MCG
1 SPRAY, SUSPENSION (ML) NASAL
COMMUNITY
Start: 2022-11-14 | End: 2023-05-15

## 2023-04-10 RX ORDER — ATROPINE SULFATE 10 MG/ML
SOLUTION/ DROPS OPHTHALMIC
COMMUNITY
Start: 2023-02-09

## 2023-04-10 RX ORDER — METHOCARBAMOL 750 MG/1
750 TABLET, FILM COATED ORAL
COMMUNITY
Start: 2023-04-04 | End: 2023-05-15

## 2023-04-10 RX ORDER — CARVEDILOL 6.25 MG/1
1 TABLET ORAL 2 TIMES DAILY
COMMUNITY
Start: 2022-09-06 | End: 2023-05-15

## 2023-04-10 RX ORDER — INSULIN GLARGINE 100 [IU]/ML
50 INJECTION, SOLUTION SUBCUTANEOUS
COMMUNITY
Start: 2023-02-22

## 2023-04-10 RX ORDER — PEN NEEDLE, DIABETIC 30 GX3/16"
NEEDLE, DISPOSABLE MISCELLANEOUS
COMMUNITY
Start: 2023-01-25

## 2023-04-10 RX ORDER — NAFTIFINE HYDROCHLORIDE 2 G/100G
1 GEL TOPICAL 2 TIMES DAILY PRN
COMMUNITY
Start: 2022-11-29

## 2023-04-10 RX ORDER — BUSPIRONE HYDROCHLORIDE 15 MG/1
15 TABLET ORAL 3 TIMES DAILY
COMMUNITY
Start: 2023-02-22

## 2023-04-10 RX ORDER — ROSUVASTATIN CALCIUM 20 MG/1
20 TABLET, COATED ORAL NIGHTLY
COMMUNITY
Start: 2023-02-22 | End: 2023-05-15

## 2023-04-10 RX ORDER — METHOCARBAMOL 750 MG/1
TABLET, FILM COATED ORAL
COMMUNITY
Start: 2023-04-04 | End: 2023-05-15

## 2023-04-10 RX ORDER — CICLOPIROX 80 MG/ML
SOLUTION TOPICAL
COMMUNITY
Start: 2022-11-22 | End: 2023-05-15

## 2023-04-10 RX ORDER — HYDROXYZINE PAMOATE 50 MG/1
50 CAPSULE ORAL
COMMUNITY
End: 2023-05-15

## 2023-04-10 RX ORDER — INSULIN GLARGINE 100 [IU]/ML
INJECTION, SOLUTION SUBCUTANEOUS
COMMUNITY
Start: 2023-02-23

## 2023-04-10 RX ORDER — CARVEDILOL 6.25 MG/1
6.25 TABLET ORAL 2 TIMES DAILY
COMMUNITY
Start: 2023-02-22

## 2023-04-10 RX ORDER — ZOLPIDEM TARTRATE 10 MG/1
1 TABLET ORAL NIGHTLY PRN
COMMUNITY
Start: 2022-04-25

## 2023-04-10 RX ORDER — LEVOCETIRIZINE DIHYDROCHLORIDE 5 MG/1
5 TABLET, FILM COATED ORAL NIGHTLY
COMMUNITY
Start: 2023-02-22

## 2023-04-10 RX ORDER — PREGABALIN 100 MG/1
200 CAPSULE ORAL
COMMUNITY
Start: 2022-11-22

## 2023-04-10 RX ORDER — CLOPIDOGREL BISULFATE 75 MG/1
75 TABLET ORAL
COMMUNITY
Start: 2023-02-22

## 2023-04-10 RX ORDER — CARIPRAZINE 6 MG/1
6 CAPSULE, GELATIN COATED ORAL
COMMUNITY
Start: 2023-02-22

## 2023-04-10 RX ORDER — BUTALBITAL, ASPIRIN, AND CAFFEINE 325; 50; 40 MG/1; MG/1; MG/1
1 CAPSULE ORAL EVERY 6 HOURS PRN
COMMUNITY
Start: 2023-04-04 | End: 2023-05-15

## 2023-04-10 RX ORDER — INSULIN LISPRO 100 [IU]/ML
INJECTION, SOLUTION INTRAVENOUS; SUBCUTANEOUS
COMMUNITY
Start: 2022-11-16

## 2023-04-10 RX ORDER — MELOXICAM 15 MG/1
15 TABLET ORAL
COMMUNITY
Start: 2022-11-02 | End: 2023-05-29

## 2023-04-10 RX ORDER — LEVOCETIRIZINE DIHYDROCHLORIDE 5 MG/1
1 TABLET, FILM COATED ORAL NIGHTLY
COMMUNITY
Start: 2023-03-21 | End: 2023-05-15

## 2023-04-10 RX ORDER — ROSUVASTATIN CALCIUM 20 MG/1
1 TABLET, COATED ORAL EVERY MORNING
COMMUNITY

## 2023-04-10 RX ORDER — ACETAMINOPHEN AND CODEINE PHOSPHATE 300; 30 MG/1; MG/1
1 TABLET ORAL
COMMUNITY
Start: 2022-10-18 | End: 2023-05-29

## 2023-04-10 NOTE — PROGRESS NOTES
DATE: 4/10/2023  PATIENT: Cam Pang    Attending Physician: Gage Maciel M.D.    CHIEF COMPLAINT: LBP and RLE pain    HISTORY:  Cam Pang is a 53 y.o. male presents for initial evaluation of low back and right leg pain (Back - 8, Leg - 8). The pain has been present for 6-7 years. The patient describes the pain as dull and it radiates posteriorly down RLE to the bottom of foot.  The pain is worse with activity and improved by rest. There is RLE associated numbness and tingling. There is RLE subjective weakness. Prior treatments have included meds (mobic, norco, robaxin, lyrica), LARISSA x2 (R L5& S1 TFESI [1st one helped for a wk and 2nd one did not help at all]), but no PT or surgery.    The Patient denies myelopathic symptoms such as handwriting changes or difficulty with buttons/coins/keys. Denies perineal paresthesias, bowel/bladder dysfunction.    The patient does not smoke (quit 11 months ago) or endorse IVDU. He has DM (HA1C of 6.5 2 years ago). The patient takes Plavix for hx of stents. He is disabled due to back and mental issues.    PAST MEDICAL/SURGICAL HISTORY:  Past Medical History:   Diagnosis Date    Coronary artery disease     Depression     Diabetes mellitus     Schizophrenia, unspecified      No past surgical history on file.    Current Medications:   Current Outpatient Medications:     acetaminophen-codeine 300-30mg (TYLENOL #3) 300-30 mg Tab, Take 1 tablet by mouth., Disp: , Rfl:     atropine 1% (ISOPTO ATROPINE) 1 % Drop, Place into both eyes., Disp: , Rfl:     busPIRone (BUSPAR) 15 MG tablet, Take 15 mg by mouth 3 (three) times daily., Disp: , Rfl:     busPIRone (BUSPAR) 15 MG tablet, Take 1 tablet by mouth every morning., Disp: , Rfl:     butalbital-aspirin-caffeine -40 mg (FIORINAL) -40 mg Cap, Take by mouth., Disp: , Rfl:     butalbital-aspirin-caffeine -40 mg (FIORINAL) -40 mg Cap, Take 1 capsule by mouth every 6 (six) hours as needed., Disp:  , Rfl:     carvediloL (COREG) 6.25 MG tablet, Take 6.25 mg by mouth 2 (two) times daily., Disp: , Rfl:     carvediloL (COREG) 6.25 MG tablet, Take 1 tablet by mouth 2 (two) times daily., Disp: , Rfl:     ciclopirox (PENLAC) 8 % Soln, Apply topically., Disp: , Rfl:     ciclopirox (PENLAC) 8 % Soln, , Disp: , Rfl:     clopidogreL (PLAVIX) 75 mg tablet, Take 75 mg by mouth., Disp: , Rfl:     FLUoxetine 20 MG capsule, Take 60 mg by mouth every morning., Disp: , Rfl:     fluticasone propionate (FLONASE) 50 mcg/actuation nasal spray, 1 spray by Each Nostril route., Disp: , Rfl:     fluticasone propionate (FLONASE) 50 mcg/actuation nasal spray, 1 spray by Nasal route., Disp: , Rfl:     HYDROcodone-acetaminophen (NORCO)  mg per tablet, Take 1 tablet by mouth every 6 (six) hours as needed for Pain., Disp: 10 tablet, Rfl: 0    hydrOXYzine pamoate (VISTARIL) 50 MG Cap, Take 50 mg by mouth 2 (two) times daily as needed., Disp: , Rfl:     hydrOXYzine pamoate (VISTARIL) 50 MG Cap, Take 50 mg by mouth., Disp: , Rfl:     insulin glargine 100 units/mL SubQ pen, Inject 50 Units into the skin., Disp: , Rfl:     insulin lispro 100 unit/mL pen, Inject into the skin., Disp: , Rfl:     insulin lispro 100 unit/mL pen, Inject 5 Units Sub-Q With The Largest Meal Of The Day., Disp: , Rfl:     LANTUS SOLOSTAR U-100 INSULIN glargine 100 units/mL SubQ pen, Inject into the skin., Disp: , Rfl:     levocetirizine (XYZAL) 5 MG tablet, Take 5 mg by mouth., Disp: , Rfl:     levocetirizine (XYZAL) 5 MG tablet, Take 1 tablet by mouth every evening., Disp: , Rfl:     lisinopriL (PRINIVIL,ZESTRIL) 20 MG tablet, Take 20 mg by mouth., Disp: , Rfl:     lisinopriL (PRINIVIL,ZESTRIL) 40 MG tablet, Take 40 mg by mouth., Disp: , Rfl:     lisinopriL (PRINIVIL,ZESTRIL) 40 MG tablet, Take 40 mg by mouth., Disp: , Rfl:     meloxicam (MOBIC) 15 MG tablet, Take 15 mg by mouth., Disp: , Rfl:     meloxicam (MOBIC) 15 MG tablet, Take 15 mg by mouth., Disp: , Rfl:  "    methocarbamoL (ROBAXIN) 750 MG Tab, Take by mouth., Disp: , Rfl:     methocarbamoL (ROBAXIN) 750 MG Tab, Take 750 mg by mouth., Disp: , Rfl:     naftifine (NAFTIN) 2 % Gel, , Disp: , Rfl:     NAFTIN 2 % Gel, SMARTSI Topical Daily, Disp: , Rfl:     pen needle, diabetic 31 gauge x 5/16" Ndle, USE THREE TIMES WITH NOVOLOG INSULIN AND DAILY WITH BASAGLAR, Disp: , Rfl:     pregabalin (LYRICA) 100 MG capsule, Take 200 mg by mouth 2 (two) times daily., Disp: , Rfl:     pregabalin (LYRICA) 100 MG capsule, Take 200 mg by mouth., Disp: , Rfl:     rosuvastatin (CRESTOR) 20 MG tablet, Take 20 mg by mouth every evening., Disp: , Rfl:     rosuvastatin (CRESTOR) 20 MG tablet, Take 1 tablet by mouth every morning., Disp: , Rfl:     spironolactone (ALDACTONE) 25 MG tablet, Take 25 mg by mouth., Disp: , Rfl:     spironolactone (ALDACTONE) 25 MG tablet, Take 1 tablet by mouth every morning., Disp: , Rfl:     VRAYLAR 6 mg Cap, Take 6 mg by mouth., Disp: , Rfl:     zolpidem (AMBIEN) 10 mg Tab, Take 10 mg by mouth nightly as needed., Disp: , Rfl:     zolpidem (AMBIEN) 10 mg Tab, Take 1 tablet by mouth nightly as needed., Disp: , Rfl:     Social History:   Social History     Socioeconomic History    Marital status:    Tobacco Use    Smoking status: Former     Packs/day: 2.00     Years: 30.00     Pack years: 60.00     Types: Cigarettes    Smokeless tobacco: Never       REVIEW OF SYSTEMS:  Constitution: Negative. Negative for chills, fever and night sweats.   Cardiovascular: Negative for chest pain and syncope.   Respiratory: Negative for cough and shortness of breath.   Gastrointestinal: See HPI. Negative for nausea/vomiting. Negative for abdominal pain.  Genitourinary: See HPI. Negative for discoloration or dysuria.  Hematologic/Lymphatic: negative for bleeding/clotting disorders.   Musculoskeletal: Negative for falls and muscle weakness.   Neurological: See HPI. no history of seizures. no history of cranial surgery or " "shunts.  Neurological: See HPI. No seizures.   Endocrine: Negative for polydipsia, polyphagia and polyuria.   Allergic/Immunologic: Negative for hives and persistent infections.     EXAM:  Ht 6' 1" (1.854 m)   Wt 124.7 kg (275 lb)   BMI 36.28 kg/m²     PHYSICAL EXAMINATION:    General: The patient is a 53 y.o. male in no apparent distress, the patient is orientatied to person, place and time.  Psych: Normal mood and affect  HEENT: Vision grossly intact, hearing intact to the spoken word.  Lungs: Respirations unlabored.  Gait: Normal station and gait, no difficulty with toe or heel walk.   Skin: Dorsal lumbar skin negative for rashes, lesions, hairy patches and surgical scars. There is lower lumbar tenderness to palpation.  Range of motion: Lumbar range of motion is acceptable.  Spinal Balance: Global saggital and coronal spinal balance acceptable, no significant for scoliosis and kyphosis.  Musculoskeletal: No pain with the range of motion of the bilateral hips. No trochanteric tenderness to palpation.  Vascular: Bilateral lower extremities warm and well perfused, Dorsalis pedis pulses 2+ bilaterally.  Neurological: Normal strength and tone in all major motor groups in the bilateral lower extremities except 4/5 in R HF, KE and ankle DF. Normal sensation to light touch in the L2-S1 dermatomes bilaterally.  Deep tendon reflexes symmetric 2+ in the bilateral lower extremities.  Negative Babinski bilaterally. Straight leg raise negative bilaterally.    IMAGING:   Today I independently reviewed the following images and my interpretations are as follows:    AP, Lat and Flex/Ex  upright L-spine demonstrate lumbar spondylosis and DDD worst at L4-S1. Significant osteophyte formation at L5-S1. No listhesis.      Body mass index is 36.28 kg/m².  Hemoglobin A1C   Date Value Ref Range Status   03/25/2021 6.5 (H) 4.8 - 5.6 % Final   08/13/2020 6.3 (H) 4.8 - 5.6 % Final   03/04/2020 5.9 <=6.5 % Final "       ASSESSMENT/PLAN:    Cam was seen today for establish care and pain.    Diagnoses and all orders for this visit:    Lumbar spondylosis  -     Ambulatory referral/consult to Physical/Occupational Therapy; Future    DDD (degenerative disc disease), lumbar    Lumbar radiculopathy    Dorsalgia, unspecified  -     MRI Lumbar Spine Without Contrast; Future      Follow up in about 1 month (around 5/10/2023).    Patient has lumbar spondylosis and RLE radiculopathy. I discussed the natural history of their diagnoses as well as surgical and nonsurgical treatment options. I educated the patient on the importance of core/back strengthening, correct posture, bending/lifting ergonomics, and low-impact aerobic exercises (walking, elliptical, and aquatherapy). Continue medications. I will refer the patient to PT for core/back strengthening. Patient will follow up in 4 weeks for MRI review.    Gage Maciel MD  Orthopaedic Spine Surgeon  Department of Orthopaedic Surgery  [phone]

## 2023-05-02 ENCOUNTER — HOSPITAL ENCOUNTER (OUTPATIENT)
Dept: RADIOLOGY | Facility: HOSPITAL | Age: 53
Discharge: HOME | End: 2023-05-02
Attending: ORTHOPAEDIC SURGERY
Payer: MEDICARE

## 2023-05-02 DIAGNOSIS — M54.9 DORSALGIA, UNSPECIFIED: ICD-10-CM

## 2023-05-02 PROCEDURE — 72148 MRI LUMBAR SPINE W/O DYE: CPT | Mod: TC

## 2023-05-08 ENCOUNTER — OFFICE VISIT (OUTPATIENT)
Dept: ORTHOPEDIC SURGERY | Facility: CLINIC | Age: 53
End: 2023-05-08
Payer: MEDICARE

## 2023-05-08 VITALS — WEIGHT: 274.94 LBS | HEIGHT: 73 IN | BODY MASS INDEX: 36.44 KG/M2

## 2023-05-08 DIAGNOSIS — M51.36 DDD (DEGENERATIVE DISC DISEASE), LUMBAR: ICD-10-CM

## 2023-05-08 DIAGNOSIS — M54.16 LUMBAR RADICULOPATHY: ICD-10-CM

## 2023-05-08 DIAGNOSIS — M47.816 LUMBAR SPONDYLOSIS: Primary | ICD-10-CM

## 2023-05-08 PROCEDURE — 3008F PR BODY MASS INDEX (BMI) DOCUMENTED: ICD-10-PCS | Mod: CPTII,S$GLB,, | Performed by: ORTHOPAEDIC SURGERY

## 2023-05-08 PROCEDURE — 99214 PR OFFICE/OUTPT VISIT, EST, LEVL IV, 30-39 MIN: ICD-10-PCS | Mod: S$GLB,,, | Performed by: ORTHOPAEDIC SURGERY

## 2023-05-08 PROCEDURE — 99214 OFFICE O/P EST MOD 30 MIN: CPT | Mod: S$GLB,,, | Performed by: ORTHOPAEDIC SURGERY

## 2023-05-08 PROCEDURE — 1160F RVW MEDS BY RX/DR IN RCRD: CPT | Mod: CPTII,S$GLB,, | Performed by: ORTHOPAEDIC SURGERY

## 2023-05-08 PROCEDURE — 3008F BODY MASS INDEX DOCD: CPT | Mod: CPTII,S$GLB,, | Performed by: ORTHOPAEDIC SURGERY

## 2023-05-08 PROCEDURE — 1160F PR REVIEW ALL MEDS BY PRESCRIBER/CLIN PHARMACIST DOCUMENTED: ICD-10-PCS | Mod: CPTII,S$GLB,, | Performed by: ORTHOPAEDIC SURGERY

## 2023-05-08 PROCEDURE — 1159F PR MEDICATION LIST DOCUMENTED IN MEDICAL RECORD: ICD-10-PCS | Mod: CPTII,S$GLB,, | Performed by: ORTHOPAEDIC SURGERY

## 2023-05-08 PROCEDURE — 1159F MED LIST DOCD IN RCRD: CPT | Mod: CPTII,S$GLB,, | Performed by: ORTHOPAEDIC SURGERY

## 2023-05-08 NOTE — PROGRESS NOTES
"DATE: 5/8/2023  PATIENT: Cam Pang    Attending Physician: Gage Maciel M.D.    HISTORY:  Cam Pang is a 53 y.o. male who returns to me today for MRI review. Patient continues to have LBP that radiates posteriorly down RLE to the bottom of foot. Patient has been taking meds without much help. He would like to try PT.    The patient does not smoke (quit 11 months ago) or endorse IVDU. He has DM (HA1C of 6.5 2 years ago). The patient takes Plavix for hx of stents. He is disabled due to back and mental issues.    PMH/PSH/FamHx/SocHx:  Unchanged from prior visit     ROS:  Positive for LBP and RLE pain  Denies perineal paresthesias, bowel or bladder incontinence    EXAM:  Ht 6' 1" (1.854 m)   Wt 124.7 kg (274 lb 14.6 oz)   BMI 36.27 kg/m²     My physical examination was notable for the following findings: Normal strength and tone in all major motor groups in the bilateral lower extremities except 4/5 in R HF, KE and ankle DF. Normal sensation to light touch in the L2-S1 dermatomes bilaterally.    IMAGING:  Today I independently reviewed the following images and my interpretations are as follows:    Previous L-spine XRs showed lumbar spondylosis and DDD worst at L4-S1. Significant osteophyte formation at L5-S1. No listhesis.       Lumbar MRI showed L4-5 L paracentral HNP with moderate stenosis.    ASSESSMENT/PLAN:  Patient has lumbar spondylosis and RLE radiculopathy, but the HNP is left sided. I discussed the natural history of their diagnoses as well as surgical and nonsurgical treatment options. I educated the patient on the importance of core/back strengthening, correct posture, bending/lifting ergonomics, and low-impact aerobic exercises (walking, elliptical, and aquatherapy). Continue medications. I ordered EMG BLE to further elucidate his radiculopathy. I will refer the patient to PT for core/back strengthening. Patient will follow up in 3 months for EMG review.    Gage Maciel, " MD  Orthopaedic Spine Surgeon  Department of Orthopaedic Surgery  [phone]

## 2023-05-15 ENCOUNTER — HOSPITAL ENCOUNTER (OUTPATIENT)
Dept: RADIOLOGY | Facility: HOSPITAL | Age: 53
Discharge: HOME | End: 2023-05-15
Attending: FAMILY MEDICINE
Payer: MEDICARE

## 2023-05-15 ENCOUNTER — OFFICE VISIT (OUTPATIENT)
Dept: ORTHOPEDICS | Facility: CLINIC | Age: 53
End: 2023-05-15
Payer: MEDICARE

## 2023-05-15 VITALS
HEART RATE: 87 BPM | BODY MASS INDEX: 36.05 KG/M2 | SYSTOLIC BLOOD PRESSURE: 115 MMHG | WEIGHT: 272 LBS | HEIGHT: 73 IN | DIASTOLIC BLOOD PRESSURE: 77 MMHG

## 2023-05-15 DIAGNOSIS — M25.522 LEFT ELBOW PAIN: ICD-10-CM

## 2023-05-15 DIAGNOSIS — S46.312A RUPTURE OF LEFT TRICEPS TENDON, INITIAL ENCOUNTER: ICD-10-CM

## 2023-05-15 DIAGNOSIS — T14.8XXA AVULSION FRACTURE: Primary | ICD-10-CM

## 2023-05-15 PROCEDURE — 99215 OFFICE O/P EST HI 40 MIN: CPT | Mod: PBBFAC

## 2023-05-15 PROCEDURE — 73080 X-RAY EXAM OF ELBOW: CPT | Mod: TC,LT

## 2023-05-15 NOTE — PROGRESS NOTES
Subjective:     Cam Pang     Chief Complaint   Patient presents with    Left Elbow - Pain       HPI  Cam is a 53 y.o. left-hand dominant male coming in today for left elbow pain that began about 6 weeks ago (DOI 03/29/2023).  Patient states that he was ambulating when he became very lightheaded and had a brief loss of consciousness episode landing on his left elbow.  Immediately he was complaining of pain and swelling on his left elbow.  Patient was seen in the ED were x-ray of the left elbow showed posterior aspect soft tissue calcification at the level of the distal humerus which raised concern for an avulsion fracture. Patient feels like he has overall improved. His swelling and pain has overall improved.  He currently rates the pain a 4/10, located on the the distal tricep and described as an aching pain. Patient does also complain of relative weakness with elbow flexion and extension on his left side. Denies any other musculoskeletal complaints at this time.     Joint instability? no  Mechanical locking/clicking? no  Affecting ADL's? no  Affecting sleep? Yes, it usually is more painful at night      ROS    PAST MEDICAL HISTORY:   Past Medical History:   Diagnosis Date    Coronary artery disease     Depression     Diabetes mellitus     Schizophrenia, unspecified      PAST SURGICAL HISTORY: No past surgical history on file.  FAMILY HISTORY: No family history on file.  SOCIAL HISTORY:   Social History     Socioeconomic History    Marital status:    Tobacco Use    Smoking status: Former     Packs/day: 2.00     Years: 30.00     Pack years: 60.00     Types: Cigarettes    Smokeless tobacco: Never       MEDICATIONS:   Current Outpatient Medications:     acetaminophen-codeine 300-30mg (TYLENOL #3) 300-30 mg Tab, Take 1 tablet by mouth., Disp: , Rfl:     atropine 1% (ISOPTO ATROPINE) 1 % Drop, Place into both eyes., Disp: , Rfl:     busPIRone (BUSPAR) 15 MG tablet, Take 15 mg by mouth 3  "(three) times daily., Disp: , Rfl:     butalbital-aspirin-caffeine -40 mg (FIORINAL) -40 mg Cap, Take by mouth., Disp: , Rfl:     carvediloL (COREG) 6.25 MG tablet, Take 6.25 mg by mouth 2 (two) times daily., Disp: , Rfl:     ciclopirox (PENLAC) 8 % Soln, Apply topically., Disp: , Rfl:     clopidogreL (PLAVIX) 75 mg tablet, Take 75 mg by mouth., Disp: , Rfl:     FLUoxetine 20 MG capsule, Take 60 mg by mouth every morning., Disp: , Rfl:     fluticasone propionate (FLONASE) 50 mcg/actuation nasal spray, 1 spray by Each Nostril route., Disp: , Rfl:     HYDROcodone-acetaminophen (NORCO)  mg per tablet, Take 1 tablet by mouth every 6 (six) hours as needed for Pain., Disp: 10 tablet, Rfl: 0    insulin glargine 100 units/mL SubQ pen, Inject 50 Units into the skin., Disp: , Rfl:     insulin lispro 100 unit/mL pen, Inject into the skin., Disp: , Rfl:     LANTUS SOLOSTAR U-100 INSULIN glargine 100 units/mL SubQ pen, Inject into the skin., Disp: , Rfl:     levocetirizine (XYZAL) 5 MG tablet, Take 5 mg by mouth., Disp: , Rfl:     lisinopriL (PRINIVIL,ZESTRIL) 40 MG tablet, Take 40 mg by mouth., Disp: , Rfl:     meloxicam (MOBIC) 15 MG tablet, Take 15 mg by mouth., Disp: , Rfl:     naftifine (NAFTIN) 2 % Gel, , Disp: , Rfl:     NAFTIN 2 % Gel, SMARTSI Topical Daily, Disp: , Rfl:     pen needle, diabetic 31 gauge x 5/16" Ndle, USE THREE TIMES WITH NOVOLOG INSULIN AND DAILY WITH BASAGLAR, Disp: , Rfl:     pregabalin (LYRICA) 100 MG capsule, Take 200 mg by mouth., Disp: , Rfl:     rosuvastatin (CRESTOR) 20 MG tablet, Take 1 tablet by mouth every morning., Disp: , Rfl:     spironolactone (ALDACTONE) 25 MG tablet, Take 25 mg by mouth., Disp: , Rfl:     VRAYLAR 6 mg Cap, Take 6 mg by mouth., Disp: , Rfl:     zolpidem (AMBIEN) 10 mg Tab, Take 1 tablet by mouth nightly as needed., Disp: , Rfl:   ALLERGIES:   Review of patient's allergies indicates:   Allergen Reactions    Meperidine Itching and Nausea And Vomiting " "      Objective:     VITAL SIGNS: /77   Pulse 87   Ht 6' 1" (1.854 m)   Wt 123.4 kg (272 lb)   BMI 35.89 kg/m²    Ortho/SPM Exam    MUSCULOSKELETAL EXAM  Elbow: left ELBOW  The affected elbow is compared to the contralateral elbow.    Observation:    There is no edema, erythema, or ecchymosis.  There is no obvious muscle atrophy, hypertonicity, or hypotonicity of arm muscles.  There is no abnormal carrying angle or gunstock deformity noted.    ROM (* = with pain):  Active flexion to 150° on left and 150° on right.    Active extension to 0° on left and 0° on right. Without hyperextension bilaterally.   Active pronation to 80° on left and 80° on right.  Active supination to 80° on left and 80° on right.    Active radial deviation to 20° on left and 20° on right.   Active ulnar deviation to 30° on left and 30° on right.    Tenderness To Palpation:  No tenderness at the medial epicondyle or lateral epicondyle.  No tenderness at the olecranon but tenderness over the distal triceps with a palpable mass  No tenderness at the proximal radius/ulna.  No tenderness at the radial head.  No tenderness over the ulnar and radial collateral ligaments.  No tenderness over the posterior interosseous nerve or distal biceps tendon.    Strength Testing (* = with pain):  Deltoid - 5/5 on left and 5/5 on right  Biceps - 4/5 on left and 5/5 on right  Triceps - 4/5 on left and 5/5 on right  Wrist extension - 5/5 on left and 5/5 on right  Wrist flexion - 5/5 on left and 5/5 on right   - 5/5 on left and 5/5 on right  Finger extension - 5/5 on left and 5/5 on right  Finger abduction - 5/5 on left and 5/5 on right    Special Tests:  No laxity of the MCL at 0 and 30 degrees.    Milking maneuver - negative  No laxity of the LCL at 0 and 30 degrees.  No laxity with posterolateral and posteromedial rotary testing.    Neurovascular Exam:  2+ radial pulses bilaterally.  Sensation to light touch intact in the forearm and " hand.    IMAGIN. X-ray ordered due to left elbow pain. (AP, lateral, and radial capitellar views) taken today.   2. X-ray images were reviewed personally by me and then directly with patient.  3. FINDINGS: X-ray images obtained demonstrate a soft tissue calcification at the level of the distal humerus. XR of the Right elbow in  reveal a similar soft tissue calcification, which makes patella cubiti most likely    Assessment:      Encounter Diagnoses   Name Primary?    Avulsion fracture Yes    Rupture of left triceps tendon, initial encounter     Left elbow pain      Plan:   Dx: Posterior left elbow pain after traumatic fall  -  Patient presents to the clinic today after a traumatic fall onto his left elbow 7 weeks ago (DOI 3/29/2023). X-ray images of left elbow taken today (AP, lateral, and radial capitellar views) showed a triceps soft tissue calcification which could be d/t a traumatic effusion vs. Patella cubiti.  - Will order left elbow MRI to further evaluate  - HEP provided to patient  - Follow-up after MRI results  - Patient agreeable to today's plan and all questions were answered    This note is dictated using the M*Modal Fluency Direct word recognition program. There are word recognition mistakes that are occasionally missed on review.    Ani Elizondo MD  Sports Medicine Fellow

## 2023-05-16 NOTE — PROGRESS NOTES
Faculty Attestation: Cam Carloz Pang  was seen in Sports Medicine Clinic. Discussed with Dr. Elziondo at the time of the visit. History of Present Illness, Physical Exam, and Assessment and Plan reviewed. Treatment plan is reasonable and appropriate. Compliance with treatment recommendations is important.  Radiology images independently reviewed and agree with radiologist interpretation.      Shania Olguin MD  Family/Sports Medicine

## 2023-05-29 ENCOUNTER — OFFICE VISIT (OUTPATIENT)
Dept: ORTHOPEDICS | Facility: CLINIC | Age: 53
End: 2023-05-29
Payer: MEDICARE

## 2023-05-29 VITALS
BODY MASS INDEX: 36.53 KG/M2 | HEART RATE: 74 BPM | SYSTOLIC BLOOD PRESSURE: 138 MMHG | DIASTOLIC BLOOD PRESSURE: 85 MMHG | HEIGHT: 73 IN | WEIGHT: 275.63 LBS

## 2023-05-29 DIAGNOSIS — S46.312D RUPTURE OF LEFT TRICEPS TENDON, SUBSEQUENT ENCOUNTER: Primary | ICD-10-CM

## 2023-05-29 PROBLEM — I15.2 HYPERTENSION ASSOCIATED WITH TYPE 2 DIABETES MELLITUS: Status: ACTIVE | Noted: 2023-05-29

## 2023-05-29 PROBLEM — I25.10 CORONARY ARTERY DISEASE INVOLVING NATIVE CORONARY ARTERY OF NATIVE HEART WITHOUT ANGINA PECTORIS: Status: ACTIVE | Noted: 2023-05-29

## 2023-05-29 PROBLEM — K21.9 GASTROESOPHAGEAL REFLUX DISEASE WITHOUT ESOPHAGITIS: Status: ACTIVE | Noted: 2019-04-08

## 2023-05-29 PROBLEM — Z72.0 TOBACCO USER: Status: ACTIVE | Noted: 2020-08-13

## 2023-05-29 PROBLEM — E11.59 HYPERTENSION ASSOCIATED WITH TYPE 2 DIABETES MELLITUS: Status: ACTIVE | Noted: 2023-05-29

## 2023-05-29 PROBLEM — F31.9 BIPOLAR DISORDER: Status: ACTIVE | Noted: 2023-05-29

## 2023-05-29 PROCEDURE — 99214 OFFICE O/P EST MOD 30 MIN: CPT | Mod: PBBFAC

## 2023-05-29 RX ORDER — NAPROXEN 500 MG/1
500 TABLET ORAL 2 TIMES DAILY WITH MEALS
Qty: 14 TABLET | Refills: 0 | Status: SHIPPED | OUTPATIENT
Start: 2023-05-29

## 2023-05-29 RX ORDER — ASPIRIN 81 MG/1
1 TABLET ORAL EVERY MORNING
COMMUNITY

## 2023-05-29 RX ORDER — DICLOFENAC SODIUM 10 MG/G
2 GEL TOPICAL 4 TIMES DAILY PRN
Qty: 100 G | Refills: 3 | Status: SHIPPED | OUTPATIENT
Start: 2023-05-29 | End: 2023-08-27

## 2023-05-29 NOTE — PROGRESS NOTES
Subjective:     Cam Pang     Chief Complaint   Patient presents with    Left Elbow - Pain     Cam is a 53 y.o. left-hand dominant male who presents for follow-up of left elbow pain that began about 8 weeks ago after a ground level fall (DOI 03/29/2023).  He was last evaluated for this on 05/15/2023.  At that time, x-rays showed a soft tissue calcification in the area of the triceps is.  Therefore, an MRI was ordered.  Since his last visit, patient has been doing some home exercises and notes improvement in his pain.  He does feel that he is mildly weaker in his left arm compared to his right.    Joint instability? no  Mechanical locking/clicking? no  Affecting ADL's? no  Affecting sleep? Yes, it usually is more painful at night    PAST MEDICAL HISTORY:   Past Medical History:   Diagnosis Date    Coronary artery disease     Depression     Diabetes mellitus     Diabetes mellitus, type 2     Hypertension     Schizophrenia, unspecified      PAST SURGICAL HISTORY: History reviewed. No pertinent surgical history.  FAMILY HISTORY:   Family History   Family history unknown: Yes     SOCIAL HISTORY:   Social History     Socioeconomic History    Marital status:    Tobacco Use    Smoking status: Former     Packs/day: 2.00     Years: 30.00     Pack years: 60.00     Types: Cigarettes    Smokeless tobacco: Never       MEDICATIONS:   Current Outpatient Medications:     aspirin (ECOTRIN) 81 MG EC tablet, Take 1 tablet by mouth every morning., Disp: , Rfl:     atropine 1% (ISOPTO ATROPINE) 1 % Drop, Place into both eyes., Disp: , Rfl:     busPIRone (BUSPAR) 15 MG tablet, Take 15 mg by mouth 3 (three) times daily., Disp: , Rfl:     butalbital-aspirin-caffeine -40 mg (FIORINAL) -40 mg Cap, Take by mouth., Disp: , Rfl:     carvediloL (COREG) 6.25 MG tablet, Take 6.25 mg by mouth 2 (two) times daily., Disp: , Rfl:     ciclopirox (PENLAC) 8 % Soln, Apply topically., Disp: , Rfl:     clopidogreL  "(PLAVIX) 75 mg tablet, Take 75 mg by mouth., Disp: , Rfl:     FLUoxetine 20 MG capsule, Take 60 mg by mouth every morning., Disp: , Rfl:     fluticasone propionate (FLONASE) 50 mcg/actuation nasal spray, 1 spray by Each Nostril route., Disp: , Rfl:     insulin glargine 100 units/mL SubQ pen, Inject 50 Units into the skin., Disp: , Rfl:     insulin lispro 100 unit/mL pen, Inject into the skin., Disp: , Rfl:     LANTUS SOLOSTAR U-100 INSULIN glargine 100 units/mL SubQ pen, Inject into the skin., Disp: , Rfl:     levocetirizine (XYZAL) 5 MG tablet, Take 5 mg by mouth., Disp: , Rfl:     lisinopriL (PRINIVIL,ZESTRIL) 40 MG tablet, Take 40 mg by mouth., Disp: , Rfl:     naftifine (NAFTIN) 2 % Gel, , Disp: , Rfl:     NAFTIN 2 % Gel, SMARTSI Topical Daily, Disp: , Rfl:     pen needle, diabetic 31 gauge x 5/16" Ndle, USE THREE TIMES WITH NOVOLOG INSULIN AND DAILY WITH BASAGLAR, Disp: , Rfl:     pregabalin (LYRICA) 100 MG capsule, Take 200 mg by mouth., Disp: , Rfl:     rosuvastatin (CRESTOR) 20 MG tablet, Take 1 tablet by mouth every morning., Disp: , Rfl:     spironolactone (ALDACTONE) 25 MG tablet, Take 25 mg by mouth., Disp: , Rfl:     VRAYLAR 6 mg Cap, Take 6 mg by mouth., Disp: , Rfl:     zolpidem (AMBIEN) 10 mg Tab, Take 1 tablet by mouth nightly as needed., Disp: , Rfl:     diclofenac sodium (VOLTAREN) 1 % Gel, Apply 2 g topically 4 (four) times daily as needed (pain). Do not exceed 32 grams/day: do not to exceed 8 grams/day/single joint of upper extremities; do not to exceed 16 grams/day/single joint of lower extremities.  Please request refill of this medication from your PCP., Disp: 100 g, Rfl: 3    naproxen (NAPROSYN) 500 MG tablet, Take 1 tablet (500 mg total) by mouth 2 (two) times daily with meals. Please request refill of this medication from your PCP., Disp: 14 tablet, Rfl: 0  ALLERGIES:   Review of patient's allergies indicates:   Allergen Reactions    Meperidine Itching and Nausea And Vomiting " "      Objective:     VITAL SIGNS: /85   Pulse 74   Ht 6' 1" (1.854 m)   Wt 125 kg (275 lb 9.6 oz)   BMI 36.36 kg/m²      MUSCULOSKELETAL EXAM  Elbow: left ELBOW  The affected elbow is compared to the contralateral elbow.    Observation:    There is no edema, erythema, or ecchymosis.  There is no obvious muscle atrophy, hypertonicity, or hypotonicity of arm muscles.  There is no abnormal carrying angle or gunstock deformity noted.    ROM (* = with pain):  Active flexion to 150° on left and 150° on right.    Active extension to 0° on left and 0° on right. Without hyperextension bilaterally.   Active pronation to 80° on left and 80° on right.  Active supination to 80° on left and 80° on right.    Active radial deviation to 20° on left and 20° on right.   Active ulnar deviation to 30° on left and 30° on right.    Tenderness To Palpation:  No tenderness at the medial epicondyle or lateral epicondyle.  No tenderness at the olecranon or over the distal triceps  No tenderness at the proximal radius/ulna.  No tenderness at the radial head.  No tenderness over the ulnar and radial collateral ligaments.  No tenderness over the posterior interosseous nerve or distal biceps tendon.    Strength Testing (* = with pain):  Deltoid - 5/5 on left and 5/5 on right  Biceps - 5/5 on left and 5/5 on right  Triceps - 5/5 on left* and 5/5 on right  Wrist extension - 5/5 on left and 5/5 on right  Wrist flexion - 5/5 on left and 5/5 on right   - 5/5 on left and 5/5 on right  Finger extension - 5/5 on left and 5/5 on right  Finger abduction - 5/5 on left and 5/5 on right    Special Tests:  No laxity of the MCL at 0 and 30 degrees.    Milking maneuver - negative  No laxity of the LCL at 0 and 30 degrees.  No laxity with posterolateral and posteromedial rotary testing.    Neurovascular Exam:  2+ radial pulses bilaterally.  Sensation to light touch intact in the forearm and hand.    IMAGING:  X-rays ordered today: No  Previous X-ray " images were reviewed personally by me and discussed with the patient.  My interpretation:  There is a small calcification in the area of the triceps proximally 3 cm proximal to the olecranon insertion.  There is no fracture or dislocation appreciated.    MRI w/o Contrast Left Elbow  Left elbow subacute appearing high-grade partial-thickness insertional triceps tendon tear, with calcified tendon stump retracted cephalad by 3.5 cm.    Assessment:      Encounter Diagnosis   Name Primary?    Rupture of left triceps tendon, subsequent encounter Yes       Plan:     Dx: left partial-thickness tear of the triceps s/p ground level fall 8 weeks and 5 days ago.  Although, this appears subacute in nature.  Patient does have some inflammatory changes and around the distal triceps.  Treatment Plan: Discussed with patient diagnosis and treatment recommendations.   Case was discussed with orthopedic surgery colleague who recommends non-surgical management with anti-inflammatories and PT.  Natural history and expected course discussed. Questions answered.  Educational material distributed.  Reduction in offending activity.  Gentle ROM exercises.  Rest, ice, compression, and elevation (RICE) therapy.  Home physical therapy exercise handouts provided to patient.   formal PT script provided to patient. You may take this script to whichever physical therapist you would like to go to.   Over the counter NSAID and/or tylenol provided you do not have contraindications such as but not limited to liver or kidney disease or uncontrolled blood pressure. If you're doctors have told you to to not take them based on your health, do not take them.   Imaging: prior radiological studies independently reviewed; discussed with patient; agree with radiologist interpretation.   Therapy: Physical Therapy  Medication: first line treatment with daily acetaminophen. Up to 1000 mg three times daily can be taken; medication precautions given.  START naproxen  500 mg b.i.d. and Voltaren gel for short course. Please see your primary care physician for further refills.  RTC: PRN; call if any issues.    Jarret Singh, DO  Sports Medicine Fellow     This note is dictated using the M*Modal Fluency Direct word recognition program. There are word recognition mistakes that are occasionally missed on review.

## 2023-06-06 DIAGNOSIS — R51.9 FREQUENT HEADACHES: Primary | ICD-10-CM

## 2023-06-12 ENCOUNTER — OFFICE VISIT (OUTPATIENT)
Dept: ORTHOPEDIC SURGERY | Facility: CLINIC | Age: 53
End: 2023-06-12
Payer: MEDICARE

## 2023-06-12 VITALS — BODY MASS INDEX: 36.52 KG/M2 | WEIGHT: 275.56 LBS | HEIGHT: 73 IN

## 2023-06-12 DIAGNOSIS — M54.16 LUMBAR RADICULOPATHY: ICD-10-CM

## 2023-06-12 DIAGNOSIS — M47.816 LUMBAR SPONDYLOSIS: ICD-10-CM

## 2023-06-12 DIAGNOSIS — M51.36 DDD (DEGENERATIVE DISC DISEASE), LUMBAR: ICD-10-CM

## 2023-06-12 DIAGNOSIS — R26.89 IMBALANCE: Primary | ICD-10-CM

## 2023-06-12 PROCEDURE — 99214 PR OFFICE/OUTPT VISIT, EST, LEVL IV, 30-39 MIN: ICD-10-PCS | Mod: S$GLB,,, | Performed by: ORTHOPAEDIC SURGERY

## 2023-06-12 PROCEDURE — 3008F BODY MASS INDEX DOCD: CPT | Mod: CPTII,S$GLB,, | Performed by: ORTHOPAEDIC SURGERY

## 2023-06-12 PROCEDURE — 1160F RVW MEDS BY RX/DR IN RCRD: CPT | Mod: CPTII,S$GLB,, | Performed by: ORTHOPAEDIC SURGERY

## 2023-06-12 PROCEDURE — 1159F MED LIST DOCD IN RCRD: CPT | Mod: CPTII,S$GLB,, | Performed by: ORTHOPAEDIC SURGERY

## 2023-06-12 PROCEDURE — 1159F PR MEDICATION LIST DOCUMENTED IN MEDICAL RECORD: ICD-10-PCS | Mod: CPTII,S$GLB,, | Performed by: ORTHOPAEDIC SURGERY

## 2023-06-12 PROCEDURE — 4010F PR ACE/ARB THEARPY RXD/TAKEN: ICD-10-PCS | Mod: CPTII,S$GLB,, | Performed by: ORTHOPAEDIC SURGERY

## 2023-06-12 PROCEDURE — 99214 OFFICE O/P EST MOD 30 MIN: CPT | Mod: S$GLB,,, | Performed by: ORTHOPAEDIC SURGERY

## 2023-06-12 PROCEDURE — 1160F PR REVIEW ALL MEDS BY PRESCRIBER/CLIN PHARMACIST DOCUMENTED: ICD-10-PCS | Mod: CPTII,S$GLB,, | Performed by: ORTHOPAEDIC SURGERY

## 2023-06-12 PROCEDURE — 3008F PR BODY MASS INDEX (BMI) DOCUMENTED: ICD-10-PCS | Mod: CPTII,S$GLB,, | Performed by: ORTHOPAEDIC SURGERY

## 2023-06-12 PROCEDURE — 4010F ACE/ARB THERAPY RXD/TAKEN: CPT | Mod: CPTII,S$GLB,, | Performed by: ORTHOPAEDIC SURGERY

## 2023-06-12 NOTE — PROGRESS NOTES
"DATE: 6/12/2023  PATIENT: Cam Pang    Attending Physician: Gage Maciel M.D.    HISTORY:  Cam Pang is a 53 y.o. male who returns to me today for FU. Patient continues to have LBP that radiates posteriorly down RLE to the bottom of foot. Patient has been taking meds without much help. He has been doing PT but it's not helping. He has been using a cane b/c he has problems with walking/balance.    The patient does not smoke (quit 11 months ago) or endorse IVDU. He has DM (HA1C of 6.6). The patient takes Plavix for hx of stents. He is disabled due to back and mental issues.    PMH/PSH/FamHx/SocHx:  Unchanged from prior visit     ROS:  Positive for LBP and RLE pain  Denies perineal paresthesias, bowel or bladder incontinence    EXAM:  Ht 6' 1" (1.854 m)   Wt 125 kg (275 lb 9.2 oz)   BMI 36.36 kg/m²     My physical examination was notable for the following findings: Normal strength and tone in all major motor groups in the bilateral lower extremities except 4/5 in R HF, KE and ankle DF. Normal sensation to light touch in the L2-S1 dermatomes bilaterally.    IMAGING:  Today I independently reviewed the following images and my interpretations are as follows:    Previous L-spine XRs showed lumbar spondylosis and DDD worst at L4-S1. Significant osteophyte formation at L5-S1. No listhesis.       Lumbar MRI showed L4-5 L paracentral HNP with moderate stenosis.    ASSESSMENT/PLAN:  Patient has lumbar spondylosis and RLE radiculopathy, but the HNP is left sided. I discussed the natural history of their diagnoses as well as surgical and nonsurgical treatment options. I educated the patient on the importance of core/back strengthening, correct posture, bending/lifting ergonomics, and low-impact aerobic exercises (walking, elliptical, and aquatherapy). Continue medications. I ordered EMG BLE to further elucidate his radiculopathy. I ordered thoracic MRI to evaluate stenosis. Patient will follow up " in 3 months for EMG and MRI review.    Gage Maciel MD  Orthopaedic Spine Surgeon  Department of Orthopaedic Surgery  [phone]

## 2023-07-10 ENCOUNTER — APPOINTMENT (OUTPATIENT)
Dept: RADIOLOGY | Facility: HOSPITAL | Age: 53
End: 2023-07-10
Attending: ORTHOPAEDIC SURGERY
Payer: MEDICARE

## 2023-07-10 DIAGNOSIS — R26.89 IMBALANCE: ICD-10-CM

## 2023-07-10 PROCEDURE — 72146 MRI CHEST SPINE W/O DYE: CPT | Mod: TC

## 2023-08-21 ENCOUNTER — OFFICE VISIT (OUTPATIENT)
Dept: ORTHOPEDIC SURGERY | Facility: CLINIC | Age: 53
End: 2023-08-21
Payer: MEDICARE

## 2023-08-21 DIAGNOSIS — M51.36 DDD (DEGENERATIVE DISC DISEASE), LUMBAR: ICD-10-CM

## 2023-08-21 DIAGNOSIS — M47.816 LUMBAR SPONDYLOSIS: Primary | ICD-10-CM

## 2023-08-21 DIAGNOSIS — M54.16 LUMBAR RADICULOPATHY: ICD-10-CM

## 2023-08-21 PROCEDURE — 4010F PR ACE/ARB THEARPY RXD/TAKEN: ICD-10-PCS | Mod: CPTII,S$GLB,, | Performed by: ORTHOPAEDIC SURGERY

## 2023-08-21 PROCEDURE — 1160F PR REVIEW ALL MEDS BY PRESCRIBER/CLIN PHARMACIST DOCUMENTED: ICD-10-PCS | Mod: CPTII,S$GLB,, | Performed by: ORTHOPAEDIC SURGERY

## 2023-08-21 PROCEDURE — 4010F ACE/ARB THERAPY RXD/TAKEN: CPT | Mod: CPTII,S$GLB,, | Performed by: ORTHOPAEDIC SURGERY

## 2023-08-21 PROCEDURE — 1160F RVW MEDS BY RX/DR IN RCRD: CPT | Mod: CPTII,S$GLB,, | Performed by: ORTHOPAEDIC SURGERY

## 2023-08-21 PROCEDURE — 1159F PR MEDICATION LIST DOCUMENTED IN MEDICAL RECORD: ICD-10-PCS | Mod: CPTII,S$GLB,, | Performed by: ORTHOPAEDIC SURGERY

## 2023-08-21 PROCEDURE — 99214 OFFICE O/P EST MOD 30 MIN: CPT | Mod: S$GLB,,, | Performed by: ORTHOPAEDIC SURGERY

## 2023-08-21 PROCEDURE — 99214 PR OFFICE/OUTPT VISIT, EST, LEVL IV, 30-39 MIN: ICD-10-PCS | Mod: S$GLB,,, | Performed by: ORTHOPAEDIC SURGERY

## 2023-08-21 PROCEDURE — 1159F MED LIST DOCD IN RCRD: CPT | Mod: CPTII,S$GLB,, | Performed by: ORTHOPAEDIC SURGERY

## 2023-08-21 NOTE — PROGRESS NOTES
DATE: 8/21/2023  PATIENT: Cam Pang    Attending Physician: Gage Maciel M.D.    HISTORY:  Cam Pang is a 53 y.o. male who returns to me today for MRI review. Patient continues to have LBP that radiates posteriorly down RLE to the bottom of foot. He has R groin pain; his RLE gives out causing him to fall at times. Patient has been taking meds without much help. He has been doing PT but it's not helping. He has been using a cane b/c he has problems with walking/balance.    The patient does not smoke (quit 11 months ago) or endorse IVDU. He has DM (HA1C of 6.6). The patient takes Plavix for hx of stents. He is disabled due to back and mental issues.    PMH/PSH/FamHx/SocHx:  Unchanged from prior visit     ROS:  Positive for LBP and RLE pain  Denies perineal paresthesias, bowel or bladder incontinence    EXAM:  There were no vitals taken for this visit.    My physical examination was notable for the following findings: Normal strength and tone in all major motor groups in the bilateral lower extremities except 4/5 in R HF, KE and ankle DF. Normal sensation to light touch in the L2-S1 dermatomes bilaterally.    IMAGING:  Today I independently reviewed the following images and my interpretations are as follows:    Previous L-spine XRs showed lumbar spondylosis and DDD worst at L4-S1. Significant osteophyte formation at L5-S1. No listhesis.       Lumbar MRI showed L4-5 L paracentral HNP with moderate stenosis.    Thoracic MRI showed no significant stenosis.    ASSESSMENT/PLAN:  Patient has lumbar spondylosis and RLE radiculopathy, but the HNP is left sided. I discussed the natural history of their diagnoses as well as surgical and nonsurgical treatment options. I educated the patient on the importance of core/back strengthening, correct posture, bending/lifting ergonomics, and low-impact aerobic exercises (walking, elliptical, and aquatherapy). Continue medications. I re-scheduled his EMG BLE  to further elucidate his radiculopathy. I referred him to [facility] for R hip pain. Patient will follow up in 3 months for EMG review.    Gage Maciel MD  Orthopaedic Spine Surgeon  Department of Orthopaedic Surgery  [phone]

## 2023-12-01 ENCOUNTER — PROCEDURE VISIT (OUTPATIENT)
Dept: NEUROLOGY | Facility: CLINIC | Age: 53
End: 2023-12-01
Payer: MEDICARE

## 2023-12-01 DIAGNOSIS — M79.604 LOWER EXTREMITY PAIN, RIGHT: Primary | ICD-10-CM

## 2023-12-01 DIAGNOSIS — M54.16 LUMBAR RADICULOPATHY: ICD-10-CM

## 2023-12-01 PROCEDURE — 95885 PR MUSC TST DONE W/NERV TST LIM: ICD-10-PCS | Mod: 59,S$GLB,, | Performed by: SPECIALIST

## 2023-12-01 PROCEDURE — 95886 PR EMG COMPLETE, W/ NERVE CONDUCTION STUDIES, 5+ MUSCLES: ICD-10-PCS | Mod: S$GLB,,, | Performed by: SPECIALIST

## 2023-12-01 PROCEDURE — 95886 MUSC TEST DONE W/N TEST COMP: CPT | Mod: S$GLB,,, | Performed by: SPECIALIST

## 2023-12-01 PROCEDURE — 95909 NRV CNDJ TST 5-6 STUDIES: CPT | Mod: S$GLB,,, | Performed by: SPECIALIST

## 2023-12-01 PROCEDURE — 95909 PR NERVE CONDUCTION STUDY; 5-6 STUDIES: ICD-10-PCS | Mod: S$GLB,,, | Performed by: SPECIALIST

## 2023-12-01 PROCEDURE — 95885 MUSC TST DONE W/NERV TST LIM: CPT | Mod: 59,S$GLB,, | Performed by: SPECIALIST

## 2023-12-01 NOTE — PROCEDURES
Nerve conductions / EMG performed and report scanned in chart. (Media tab)       Issa Rowe MD

## 2023-12-11 ENCOUNTER — OFFICE VISIT (OUTPATIENT)
Dept: ORTHOPEDIC SURGERY | Facility: CLINIC | Age: 53
End: 2023-12-11
Payer: MEDICARE

## 2023-12-11 DIAGNOSIS — M54.16 LUMBAR RADICULOPATHY: ICD-10-CM

## 2023-12-11 DIAGNOSIS — M47.816 LUMBAR SPONDYLOSIS: Primary | ICD-10-CM

## 2023-12-11 DIAGNOSIS — M51.36 DDD (DEGENERATIVE DISC DISEASE), LUMBAR: ICD-10-CM

## 2023-12-11 DIAGNOSIS — M25.551 RIGHT HIP PAIN: ICD-10-CM

## 2023-12-11 PROCEDURE — 4010F PR ACE/ARB THEARPY RXD/TAKEN: ICD-10-PCS | Mod: CPTII,S$GLB,, | Performed by: ORTHOPAEDIC SURGERY

## 2023-12-11 PROCEDURE — 1159F PR MEDICATION LIST DOCUMENTED IN MEDICAL RECORD: ICD-10-PCS | Mod: CPTII,S$GLB,, | Performed by: ORTHOPAEDIC SURGERY

## 2023-12-11 PROCEDURE — 3044F HG A1C LEVEL LT 7.0%: CPT | Mod: CPTII,S$GLB,, | Performed by: ORTHOPAEDIC SURGERY

## 2023-12-11 PROCEDURE — 99214 PR OFFICE/OUTPT VISIT, EST, LEVL IV, 30-39 MIN: ICD-10-PCS | Mod: S$GLB,,, | Performed by: ORTHOPAEDIC SURGERY

## 2023-12-11 PROCEDURE — 99214 OFFICE O/P EST MOD 30 MIN: CPT | Mod: S$GLB,,, | Performed by: ORTHOPAEDIC SURGERY

## 2023-12-11 PROCEDURE — 1160F PR REVIEW ALL MEDS BY PRESCRIBER/CLIN PHARMACIST DOCUMENTED: ICD-10-PCS | Mod: CPTII,S$GLB,, | Performed by: ORTHOPAEDIC SURGERY

## 2023-12-11 PROCEDURE — 1160F RVW MEDS BY RX/DR IN RCRD: CPT | Mod: CPTII,S$GLB,, | Performed by: ORTHOPAEDIC SURGERY

## 2023-12-11 PROCEDURE — 3044F PR MOST RECENT HEMOGLOBIN A1C LEVEL <7.0%: ICD-10-PCS | Mod: CPTII,S$GLB,, | Performed by: ORTHOPAEDIC SURGERY

## 2023-12-11 PROCEDURE — 4010F ACE/ARB THERAPY RXD/TAKEN: CPT | Mod: CPTII,S$GLB,, | Performed by: ORTHOPAEDIC SURGERY

## 2023-12-11 PROCEDURE — 1159F MED LIST DOCD IN RCRD: CPT | Mod: CPTII,S$GLB,, | Performed by: ORTHOPAEDIC SURGERY

## 2023-12-11 RX ORDER — BREXPIPRAZOLE 0.25 MG/1
0.25 TABLET ORAL DAILY
COMMUNITY

## 2023-12-11 NOTE — PROGRESS NOTES
DATE: 12/11/2023  PATIENT: Cam Pang    Attending Physician: Gage Maciel M.D.    HISTORY:  Cam Pang is a 53 y.o. male who returns to me today for EMG review. Patient continues to have LBP that radiates posteriorly down RLE to the bottom of foot. He has R groin pain; his RLE gives out causing him to fall at times. He said he started having some LLE pain that goes to L knee. Patient has been taking meds without much help. He has been doing PT but it's not helping. He has been using a cane b/c he has problems with walking/balance.    The patient does not smoke (quit 11 months ago) or endorse IVDU. He has DM (HA1C of 6.6). The patient takes Plavix for hx of stents. He is disabled due to back and mental issues.    PMH/PSH/FamHx/SocHx:  Unchanged from prior visit     ROS:  Positive for LBP and RLE pain  Denies perineal paresthesias, bowel or bladder incontinence    EXAM:  There were no vitals taken for this visit.    My physical examination was notable for the following findings: Normal strength and tone in all major motor groups in the bilateral lower extremities except 4/5 in R HF, KE and ankle DF. Normal sensation to light touch in the L2-S1 dermatomes bilaterally.    IMAGING:  Today I independently reviewed the following images and my interpretations are as follows:    Previous L-spine XRs showed lumbar spondylosis and DDD worst at L4-S1. Significant osteophyte formation at L5-S1. No listhesis.       Lumbar MRI showed L4-5 L paracentral HNP with moderate stenosis.    Thoracic MRI showed no significant stenosis.    EMG BLE showed diabetic neuropathy, but no radiculopathy.    ASSESSMENT/PLAN:  Patient has lumbar spondylosis and RLE radiculopathy, but the HNP is left sided. No ortho spine surgical intervention warranted. I discussed the natural history of their diagnoses as well as surgical and nonsurgical treatment options. I educated the patient on the importance of core/back  strengthening, correct posture, bending/lifting ergonomics, and low-impact aerobic exercises (walking, elliptical, and aquatherapy). Continue medications. I referred him to [facility] for R hip pain. Patient will follow up PRN.    Gage Maciel MD  Orthopaedic Spine Surgeon  Department of Orthopaedic Surgery  [phone]

## 2023-12-12 ENCOUNTER — TELEPHONE (OUTPATIENT)
Dept: ORTHOPEDICS | Facility: CLINIC | Age: 53
End: 2023-12-12
Payer: MEDICARE

## 2023-12-12 DIAGNOSIS — M25.551 RIGHT HIP PAIN: Primary | ICD-10-CM

## 2024-01-16 DIAGNOSIS — R35.1 NOCTURIA: Primary | ICD-10-CM

## 2024-01-23 ENCOUNTER — TELEPHONE (OUTPATIENT)
Dept: ORTHOPEDICS | Facility: CLINIC | Age: 54
End: 2024-01-23
Payer: MEDICARE

## 2024-02-16 ENCOUNTER — HOSPITAL ENCOUNTER (OUTPATIENT)
Dept: RADIOLOGY | Facility: CLINIC | Age: 54
Discharge: HOME | End: 2024-02-16
Attending: SPECIALIST
Payer: MEDICARE

## 2024-02-16 ENCOUNTER — OFFICE VISIT (OUTPATIENT)
Dept: ORTHOPEDICS | Facility: CLINIC | Age: 54
End: 2024-02-16
Payer: MEDICARE

## 2024-02-16 ENCOUNTER — LAB VISIT (OUTPATIENT)
Dept: LAB | Facility: HOSPITAL | Age: 54
End: 2024-02-16
Attending: SPECIALIST
Payer: MEDICARE

## 2024-02-16 VITALS
BODY MASS INDEX: 36.84 KG/M2 | HEIGHT: 73 IN | SYSTOLIC BLOOD PRESSURE: 160 MMHG | WEIGHT: 278 LBS | DIASTOLIC BLOOD PRESSURE: 94 MMHG | HEART RATE: 70 BPM

## 2024-02-16 DIAGNOSIS — M16.11 PRIMARY OSTEOARTHRITIS OF RIGHT HIP: Primary | ICD-10-CM

## 2024-02-16 DIAGNOSIS — M16.11 PRIMARY OSTEOARTHRITIS OF RIGHT HIP: ICD-10-CM

## 2024-02-16 DIAGNOSIS — M25.551 RIGHT HIP PAIN: ICD-10-CM

## 2024-02-16 DIAGNOSIS — M25.551 RIGHT HIP PAIN: Primary | ICD-10-CM

## 2024-02-16 DIAGNOSIS — E11.9 DIABETES MELLITUS: ICD-10-CM

## 2024-02-16 DIAGNOSIS — M51.36 DDD (DEGENERATIVE DISC DISEASE), LUMBAR: ICD-10-CM

## 2024-02-16 LAB
EST. AVERAGE GLUCOSE BLD GHB EST-MCNC: 128.4 MG/DL
HBA1C MFR BLD: 6.1 %

## 2024-02-16 PROCEDURE — 4010F ACE/ARB THERAPY RXD/TAKEN: CPT | Mod: CPTII,,, | Performed by: SPECIALIST

## 2024-02-16 PROCEDURE — 73502 X-RAY EXAM HIP UNI 2-3 VIEWS: CPT | Mod: RT,,, | Performed by: SPECIALIST

## 2024-02-16 PROCEDURE — 3008F BODY MASS INDEX DOCD: CPT | Mod: CPTII,,, | Performed by: SPECIALIST

## 2024-02-16 PROCEDURE — 36415 COLL VENOUS BLD VENIPUNCTURE: CPT

## 2024-02-16 PROCEDURE — 99214 OFFICE O/P EST MOD 30 MIN: CPT | Mod: ,,, | Performed by: SPECIALIST

## 2024-02-16 PROCEDURE — 3044F HG A1C LEVEL LT 7.0%: CPT | Mod: CPTII,,, | Performed by: SPECIALIST

## 2024-02-16 PROCEDURE — 3080F DIAST BP >= 90 MM HG: CPT | Mod: CPTII,,, | Performed by: SPECIALIST

## 2024-02-16 PROCEDURE — 1159F MED LIST DOCD IN RCRD: CPT | Mod: CPTII,,, | Performed by: SPECIALIST

## 2024-02-16 PROCEDURE — 3077F SYST BP >= 140 MM HG: CPT | Mod: CPTII,,, | Performed by: SPECIALIST

## 2024-02-16 PROCEDURE — 83036 HEMOGLOBIN GLYCOSYLATED A1C: CPT

## 2024-02-16 NOTE — LETTER
Date: 2024  Re: Cam Pang    : 1970  Dr. Mack,    The above named patient is scheduled to have a Robotic Assisted Right Total Hip Arthroplasty on 2024    *Type of Anesthesia: Spinal    This patient needs surgical clearance from your office for this procedure. Please perform necessary tests in order for this patient to be medically cleared for surgery. Please send or fax the clearance letter with most recent ECHO, EKG and stress test, to our office as soon as possible. Our fax number is [phone].    We appreciate your help in getting our patient cleared for surgery. Please feel free to call our office should you have any questions, [phone].        Thank you,    YOLANDE Joe MD   /joselo

## 2024-02-16 NOTE — PROGRESS NOTES
"Past Medical History:   Diagnosis Date    Coronary artery disease     Depression     Diabetes mellitus     Diabetes mellitus, type 2     Hypertension     Schizophrenia, unspecified        Past Surgical History:   Procedure Laterality Date    APPENDECTOMY      arm fracture surgery Left     cardiac stents      2x    ORIF FACIAL FRACTURE Left     SINUS SURGERY         Current Outpatient Medications   Medication Sig    aspirin (ECOTRIN) 81 MG EC tablet Take 1 tablet by mouth every morning.    atropine 1% (ISOPTO ATROPINE) 1 % Drop Place into both eyes.    brexpiprazole (REXULTI) 0.25 mg Tab Take 0.25 mg by mouth.    busPIRone (BUSPAR) 15 MG tablet Take 15 mg by mouth 3 (three) times daily.    butalbital-aspirin-caffeine -40 mg (FIORINAL) -40 mg Cap Take by mouth.    carvediloL (COREG) 6.25 MG tablet Take 6.25 mg by mouth 2 (two) times daily.    ciclopirox (PENLAC) 8 % Soln Apply topically.    clopidogreL (PLAVIX) 75 mg tablet Take 75 mg by mouth.    FLUoxetine 20 MG capsule Take 60 mg by mouth every morning.    fluticasone propionate (FLONASE) 50 mcg/actuation nasal spray 1 spray by Each Nostril route.    insulin glargine 100 units/mL SubQ pen Inject 50 Units into the skin.    insulin lispro 100 unit/mL pen Inject into the skin.    LANTUS SOLOSTAR U-100 INSULIN glargine 100 units/mL SubQ pen Inject into the skin.    levocetirizine (XYZAL) 5 MG tablet Take 5 mg by mouth.    lisinopriL (PRINIVIL,ZESTRIL) 40 MG tablet Take 40 mg by mouth.    naftifine (NAFTIN) 2 % Gel     NAFTIN 2 % Gel SMARTSI Topical Daily    naproxen (NAPROSYN) 500 MG tablet Take 1 tablet (500 mg total) by mouth 2 (two) times daily with meals. Please request refill of this medication from your PCP.    pen needle, diabetic 31 gauge x " Ndle USE THREE TIMES WITH NOVOLOG INSULIN AND DAILY WITH BASAGLAR    pregabalin (LYRICA) 100 MG capsule Take 200 mg by mouth.    rosuvastatin (CRESTOR) 20 MG tablet Take 1 tablet by mouth every morning. "    spironolactone (ALDACTONE) 25 MG tablet Take 25 mg by mouth.    VRAYLAR 6 mg Cap Take 6 mg by mouth.    zolpidem (AMBIEN) 10 mg Tab Take 1 tablet by mouth nightly as needed.    diclofenac sodium (VOLTAREN) 1 % Gel Apply 2 g topically 4 (four) times daily as needed (pain). Do not exceed 32 grams/day: do not to exceed 8 grams/day/single joint of upper extremities; do not to exceed 16 grams/day/single joint of lower extremities.  Please request refill of this medication from your PCP.     No current facility-administered medications for this visit.       Review of patient's allergies indicates:   Allergen Reactions    Corticosteroids (glucocorticoids) Other (See Comments)     Makes his sugar levels increase drastically.    Meperidine Itching and Nausea And Vomiting       Family History   Family history unknown: Yes       Social History     Socioeconomic History    Marital status:    Tobacco Use    Smoking status: Former     Current packs/day: 2.00     Average packs/day: 2.0 packs/day for 30.0 years (60.0 ttl pk-yrs)     Types: Cigarettes    Smokeless tobacco: Never    Tobacco comments:     Quit 2 years ago    Substance and Sexual Activity    Alcohol use: Not Currently     Comment: Quit 5 years ago    Drug use: Not Currently     Comment: Quit 5 years ago.    Sexual activity: Not Currently       Chief Complaint:   Chief Complaint   Patient presents with    Right Hip - Pain     Pt states he has been experiencing weakness in his Rt hip. Pt reports several falls in the past few months. Pt was referred by Dr. Maciel due nerve damage on his lower back and Rt leg. Pt describes his pain as shooting/burning/stabbing/sharp that radiates into his thigh, knee and buttock. Pt tried to be referred to a pain management doctor, but haven't heard from anybody yet. Pt can't have spinal injections steroids due his diabetes.       Consulting Physician: No ref. provider found    History of present illness:    This is a 54 y.o. year  "old male who complains of pain in his right hip and groin with radiation into the anterior aspect of the right thigh.  He also has radiculopathy into the right buttock and right posterior thigh.  He also has diabetic neuropathy.  His advanced lumbar degenerative disc disease but is currently not a surgical candidate for spine surgery.  He ambulates with a cane.  He does not take opioid pain medication.  Currently he is on no over-the-counter pain medication either.  He tried ibuprofen but this does not help relieve his pain.  It also upset his stomach.  Physical therapy has been performed for several months in the past with no relief.  He states his last hemoglobin A1c was 6.9.  Review of Systems:    Constitution:   Denies chills, fever, and sweats.  HENT:   Denies headaches or blurry vision.  Cardiovascular:  Denies chest pain or irregular heart beat.  Respiratory:   Denies cough or shortness of breath.  Gastrointestinal:  Denies abdominal pain, nausea, or vomiting.  Musculoskeletal:   Denies muscle cramps.  Neurological:   Denies dizziness or focal weakness.  Psychiatric/Behavior: Normal mental status.  Hematology/Lymph:  Denies bleeding problem or easy bruising/bleeding.  Skin:    Denies rash or suspicious lesions.    Examination:    Vital Signs:    Vitals:    02/16/24 0917 02/16/24 0922   BP: (!) 160/94    Pulse: 70    Weight: 126.1 kg (278 lb)    Height: 6' 1" (1.854 m)    PainSc:    9       Body mass index is 36.68 kg/m².    Constitution:   Well-developed, well nourished patient in no acute distress.  Neurological:   Alert and oriented x 3 and cooperative to examination.     Psychiatric/Behavior: Normal mental status.  Respiratory:   No shortness of breath.non labored breathing.  Cardiovascular: Regular rate and rhythm  Eyes:    Extraoccular muscles intact  Skin:    No scars, rash or suspicious lesions.    Physical Exam:  Right hip exam:   5° internal rotation  20° external rotation   90° flexion  Antalgic " gait   Quad atrophy   Mild gluteal weakness compared to the left   1+ dorsal pedal and posterior tibial pulse   Intact motor function with strong dorsiflexion and plantar flexion of the great and lesser toes   Mild decreased light touch sensation dorsally and plantar in the right and left lower extremities  Ambulates with a cane    Imaging: X-rays ordered and images interpreted today personally by me of AP pelvis and AP and lateral of the right hip which show protrusio and periacetabular along with femoral head osteophytes.  Patient is bone-on-bone with grade 4 changes and sclerosis in the right hip joint.  Lumbar degenerative disc disease noted on AP pelvis radiograph as well.  Impression: Advanced osteoarthrosis right hip.         Assessment: Right hip pain  -     X-Ray Hip 2 or 3 views Right (with Pelvis when performed); Future; Expected date: 02/16/2024    DDD (degenerative disc disease), lumbar    Primary osteoarthritis of right hip        Plan:  Robotic assisted right total hip arthroplasty    Risks, benefits, alternatives, and complications were explained to the patient and/or patient representative. They understand, agree, and want to proceed with the operation/ procedure.        DISCLAIMER: This note may have been dictated using voice recognition software and may contain grammatical errors.     NOTE: Consult report sent to referring provider via [employer].

## 2024-03-22 ENCOUNTER — OFFICE VISIT (OUTPATIENT)
Dept: ORTHOPEDICS | Facility: CLINIC | Age: 54
End: 2024-03-22
Payer: MEDICARE

## 2024-03-22 ENCOUNTER — HOSPITAL ENCOUNTER (OUTPATIENT)
Dept: RADIOLOGY | Facility: HOSPITAL | Age: 54
Discharge: HOME | End: 2024-03-22
Attending: PHYSICIAN ASSISTANT
Payer: MEDICARE

## 2024-03-22 VITALS
BODY MASS INDEX: 36.71 KG/M2 | SYSTOLIC BLOOD PRESSURE: 148 MMHG | HEART RATE: 68 BPM | WEIGHT: 277 LBS | DIASTOLIC BLOOD PRESSURE: 91 MMHG | HEIGHT: 73 IN

## 2024-03-22 DIAGNOSIS — R26.89 IMPAIRED GAIT AND MOBILITY: ICD-10-CM

## 2024-03-22 DIAGNOSIS — E11.9 DIABETES MELLITUS WITHOUT COMPLICATION: ICD-10-CM

## 2024-03-22 DIAGNOSIS — M16.11 PRIMARY OSTEOARTHRITIS OF RIGHT HIP: ICD-10-CM

## 2024-03-22 DIAGNOSIS — I25.10 CORONARY ARTERY DISEASE, UNSPECIFIED VESSEL OR LESION TYPE, UNSPECIFIED WHETHER ANGINA PRESENT, UNSPECIFIED WHETHER NATIVE OR TRANSPLANTED HEART: ICD-10-CM

## 2024-03-22 DIAGNOSIS — Z01.812 PRE-OPERATIVE LABORATORY EXAMINATION: ICD-10-CM

## 2024-03-22 DIAGNOSIS — M16.11 PRIMARY OSTEOARTHRITIS OF RIGHT HIP: Primary | ICD-10-CM

## 2024-03-22 DIAGNOSIS — I10 HYPERTENSION, UNSPECIFIED TYPE: ICD-10-CM

## 2024-03-22 LAB — MRSA PCR SCRN (OHS): NOT DETECTED

## 2024-03-22 PROCEDURE — 1159F MED LIST DOCD IN RCRD: CPT | Mod: CPTII,,, | Performed by: PHYSICIAN ASSISTANT

## 2024-03-22 PROCEDURE — 99213 OFFICE O/P EST LOW 20 MIN: CPT | Mod: ,,, | Performed by: PHYSICIAN ASSISTANT

## 2024-03-22 PROCEDURE — 73700 CT LOWER EXTREMITY W/O DYE: CPT | Mod: TC,RT

## 2024-03-22 PROCEDURE — 3077F SYST BP >= 140 MM HG: CPT | Mod: CPTII,,, | Performed by: PHYSICIAN ASSISTANT

## 2024-03-22 PROCEDURE — 4010F ACE/ARB THERAPY RXD/TAKEN: CPT | Mod: CPTII,,, | Performed by: PHYSICIAN ASSISTANT

## 2024-03-22 PROCEDURE — 3044F HG A1C LEVEL LT 7.0%: CPT | Mod: CPTII,,, | Performed by: PHYSICIAN ASSISTANT

## 2024-03-22 PROCEDURE — 3008F BODY MASS INDEX DOCD: CPT | Mod: CPTII,,, | Performed by: PHYSICIAN ASSISTANT

## 2024-03-22 PROCEDURE — 71046 X-RAY EXAM CHEST 2 VIEWS: CPT | Mod: TC

## 2024-03-22 PROCEDURE — 3080F DIAST BP >= 90 MM HG: CPT | Mod: CPTII,,, | Performed by: PHYSICIAN ASSISTANT

## 2024-03-22 RX ORDER — SCOLOPAMINE TRANSDERMAL SYSTEM 1 MG/1
1 PATCH, EXTENDED RELEASE TRANSDERMAL ONCE AS NEEDED
Status: CANCELLED | OUTPATIENT
Start: 2024-03-22 | End: 2035-08-18

## 2024-03-22 RX ORDER — KETOROLAC TROMETHAMINE 10 MG/1
10 TABLET, FILM COATED ORAL
Status: CANCELLED | OUTPATIENT
Start: 2024-03-22 | End: 2024-03-22

## 2024-03-22 RX ORDER — ACETAMINOPHEN 500 MG
1000 TABLET ORAL
Status: CANCELLED | OUTPATIENT
Start: 2024-03-22

## 2024-03-22 RX ORDER — TRANEXAMIC ACID 650 MG/1
1950 TABLET ORAL
Status: CANCELLED | OUTPATIENT
Start: 2024-03-22 | End: 2024-03-22

## 2024-03-22 RX ORDER — GABAPENTIN 100 MG/1
300 CAPSULE ORAL
Status: CANCELLED | OUTPATIENT
Start: 2024-03-22

## 2024-03-22 RX ORDER — SODIUM CHLORIDE, SODIUM GLUCONATE, SODIUM ACETATE, POTASSIUM CHLORIDE AND MAGNESIUM CHLORIDE 30; 37; 368; 526; 502 MG/100ML; MG/100ML; MG/100ML; MG/100ML; MG/100ML
INJECTION, SOLUTION INTRAVENOUS CONTINUOUS
Status: CANCELLED | OUTPATIENT
Start: 2024-03-22

## 2024-03-22 RX ORDER — ONDANSETRON 4 MG/1
4 TABLET, ORALLY DISINTEGRATING ORAL
Status: CANCELLED | OUTPATIENT
Start: 2024-03-22

## 2024-03-22 RX ORDER — TIRZEPATIDE 5 MG/.5ML
5 INJECTION, SOLUTION SUBCUTANEOUS
COMMUNITY
Start: 2024-03-12

## 2024-03-22 RX ORDER — TIRZEPATIDE 2.5 MG/.5ML
INJECTION, SOLUTION SUBCUTANEOUS
Status: ON HOLD | COMMUNITY
Start: 2024-01-24 | End: 2024-04-09 | Stop reason: ALTCHOICE

## 2024-03-22 NOTE — H&P (VIEW-ONLY)
"Past Medical History:   Diagnosis Date    Coronary artery disease     Depression     Diabetes mellitus     Diabetes mellitus, type 2     Hypertension     Schizophrenia, unspecified        Past Surgical History:   Procedure Laterality Date    APPENDECTOMY      arm fracture surgery Left     cardiac stents      2x    ORIF FACIAL FRACTURE Left     SINUS SURGERY         Current Outpatient Medications   Medication Sig    aspirin (ECOTRIN) 81 MG EC tablet Take 1 tablet by mouth every morning.    atropine 1% (ISOPTO ATROPINE) 1 % Drop Place into both eyes.    brexpiprazole (REXULTI) 0.25 mg Tab Take 0.25 mg by mouth.    busPIRone (BUSPAR) 15 MG tablet Take 15 mg by mouth 3 (three) times daily.    butalbital-aspirin-caffeine -40 mg (FIORINAL) -40 mg Cap Take by mouth.    carvediloL (COREG) 6.25 MG tablet Take 6.25 mg by mouth 2 (two) times daily.    ciclopirox (PENLAC) 8 % Soln Apply topically.    FLUoxetine 20 MG capsule Take 60 mg by mouth every morning.    fluticasone propionate (FLONASE) 50 mcg/actuation nasal spray 1 spray by Each Nostril route.    insulin glargine 100 units/mL SubQ pen Inject 50 Units into the skin.    insulin lispro 100 unit/mL pen Inject into the skin.    LANTUS SOLOSTAR U-100 INSULIN glargine 100 units/mL SubQ pen Inject into the skin.    levocetirizine (XYZAL) 5 MG tablet Take 5 mg by mouth.    lisinopriL (PRINIVIL,ZESTRIL) 40 MG tablet Take 40 mg by mouth.    MOUNJARO 2.5 mg/0.5 mL PnIj Inject into the skin.    MOUNJARO 5 mg/0.5 mL PnIj Inject 5 mg into the skin every 7 days.    naftifine (NAFTIN) 2 % Gel     NAFTIN 2 % Gel SMARTSI Topical Daily    naproxen (NAPROSYN) 500 MG tablet Take 1 tablet (500 mg total) by mouth 2 (two) times daily with meals. Please request refill of this medication from your PCP.    pen needle, diabetic 31 gauge x " Ndle USE THREE TIMES WITH NOVOLOG INSULIN AND DAILY WITH BASAGLAR    pregabalin (LYRICA) 100 MG capsule Take 200 mg by mouth.    " rosuvastatin (CRESTOR) 20 MG tablet Take 1 tablet by mouth every morning.    spironolactone (ALDACTONE) 25 MG tablet Take 25 mg by mouth.    VRAYLAR 6 mg Cap Take 6 mg by mouth.    zolpidem (AMBIEN) 10 mg Tab Take 1 tablet by mouth nightly as needed.    clopidogreL (PLAVIX) 75 mg tablet Take 75 mg by mouth.    diclofenac sodium (VOLTAREN) 1 % Gel Apply 2 g topically 4 (four) times daily as needed (pain). Do not exceed 32 grams/day: do not to exceed 8 grams/day/single joint of upper extremities; do not to exceed 16 grams/day/single joint of lower extremities.  Please request refill of this medication from your PCP.     No current facility-administered medications for this visit.       Review of patient's allergies indicates:   Allergen Reactions    Corticosteroids (glucocorticoids) Other (See Comments)     Makes his sugar levels increase drastically.    Meperidine Itching and Nausea And Vomiting       Family History   Family history unknown: Yes       Social History     Socioeconomic History    Marital status:    Tobacco Use    Smoking status: Former     Current packs/day: 2.00     Average packs/day: 2.0 packs/day for 30.0 years (60.0 ttl pk-yrs)     Types: Cigarettes    Smokeless tobacco: Never    Tobacco comments:     Quit 2 years ago    Substance and Sexual Activity    Alcohol use: Not Currently     Comment: Quit 5 years ago    Drug use: Not Currently     Comment: Quit 5 years ago.    Sexual activity: Not Currently       Chief Complaint:   Chief Complaint   Patient presents with    Pre-op Exam     Right TAMIKO MAO sx 4/9/24       Consulting Physician: No ref. provider found    History of present illness:    This is a 54 y.o. year old male who presents today for preop evaluation for robotic assisted right total hip arthroplasty on April 9th.  No new complaints or concerns today..  He also has radiculopathy into the right buttock and right posterior thigh.  He also has diabetic neuropathy.  His advanced lumbar  "degenerative disc disease but is currently not a surgical candidate for spine surgery.  He ambulates with a cane.  He does not take opioid pain medication.  Currently he is on no over-the-counter pain medication either.  He tried ibuprofen but this does not help relieve his pain.  It also upset his stomach.  Physical therapy has been performed for several months in the past with no relief.  He states his last hemoglobin A1c was 6.9.  Review of Systems:    Constitution:   Denies chills, fever, and sweats.  HENT:   Denies headaches or blurry vision.  Cardiovascular:  Denies chest pain or irregular heart beat.  Respiratory:   Denies cough or shortness of breath.  Gastrointestinal:  Denies abdominal pain, nausea, or vomiting.  Musculoskeletal:   Denies muscle cramps.  Neurological:   Denies dizziness or focal weakness.  Psychiatric/Behavior: Normal mental status.  Hematology/Lymph:  Denies bleeding problem or easy bruising/bleeding.  Skin:    Denies rash or suspicious lesions.    Examination:    Vital Signs:    Vitals:    03/22/24 0825   BP: (!) 148/91   Pulse: 68   Weight: 125.6 kg (277 lb)   Height: 6' 1" (1.854 m)       Body mass index is 36.55 kg/m².    Constitution:   Well-developed, well nourished patient in no acute distress.  Neurological:   Alert and oriented x 3 and cooperative to examination.     Psychiatric/Behavior: Normal mental status.  Respiratory:   No shortness of breath.non labored breathing.  Cardiovascular: Regular rate and rhythm  Eyes:    Extraoccular muscles intact  Skin:    No scars, rash or suspicious lesions.    Physical Exam:  Right hip exam:   5° internal rotation  20° external rotation   90° flexion  Antalgic gait   Quad atrophy   Mild gluteal weakness compared to the left   1+ dorsal pedal and posterior tibial pulse   Intact motor function with strong dorsiflexion and plantar flexion of the great and lesser toes   Mild decreased light touch sensation dorsally and plantar in the right and " left lower extremities  Ambulates with a cane    Imaging: X-rays reviewed of AP pelvis and AP and lateral of the right hip which show protrusio and periacetabular along with femoral head osteophytes.  Patient is bone-on-bone with grade 4 changes and sclerosis in the right hip joint.  Lumbar degenerative disc disease noted on AP pelvis radiograph as well.  Impression: Advanced osteoarthrosis right hip.         Assessment: Primary osteoarthritis of right hip    Diabetes mellitus without complication    Hypertension, unspecified type    Impaired gait and mobility    Pre-operative laboratory examination    Coronary artery disease, unspecified vessel or lesion type, unspecified whether angina present, unspecified whether native or transplanted heart        Plan:  Robotic assisted right total hip arthroplasty  We will use aspirin postoperatively for DVT prophylaxis    The patient has a history of cardiac disease and is at high risk for adverse effects of surgery and anesthesia. We will admit as inpatient and expect the patient to stay 2 nights in the hospital. We will closely monitor fluid intake and output, vital signs, neurological assessments and will use telemetry if needed. We plan for discharge once patient is stable and at baseline     Considering the patient's history of diabetes, the patient is at higher risk for uncontrolled blood sugars. We will admit as inpatient and expect the patient to stay two midnight's in the hospital to monitor CBG'S AC/HS with an insulin sliding scale and monitored dietary intake and maintain tight glycemic control. We will discharge once the patient is stable and blood sugars are well controlled     Risks, benefits, alternatives, and complications were explained to the patient and/or patient representative. They understand, agree, and want to proceed with the operation/ procedure.        DISCLAIMER: This note may have been dictated using voice recognition software and may contain  grammatical errors.     NOTE: Consult report sent to referring provider via [facility] EMR.

## 2024-03-22 NOTE — H&P
"Past Medical History:   Diagnosis Date    Coronary artery disease     Depression     Diabetes mellitus     Diabetes mellitus, type 2     Hypertension     Schizophrenia, unspecified        Past Surgical History:   Procedure Laterality Date    APPENDECTOMY      arm fracture surgery Left     cardiac stents      2x    ORIF FACIAL FRACTURE Left     SINUS SURGERY         Current Outpatient Medications   Medication Sig    aspirin (ECOTRIN) 81 MG EC tablet Take 1 tablet by mouth every morning.    atropine 1% (ISOPTO ATROPINE) 1 % Drop Place into both eyes.    brexpiprazole (REXULTI) 0.25 mg Tab Take 0.25 mg by mouth.    busPIRone (BUSPAR) 15 MG tablet Take 15 mg by mouth 3 (three) times daily.    butalbital-aspirin-caffeine -40 mg (FIORINAL) -40 mg Cap Take by mouth.    carvediloL (COREG) 6.25 MG tablet Take 6.25 mg by mouth 2 (two) times daily.    ciclopirox (PENLAC) 8 % Soln Apply topically.    FLUoxetine 20 MG capsule Take 60 mg by mouth every morning.    fluticasone propionate (FLONASE) 50 mcg/actuation nasal spray 1 spray by Each Nostril route.    insulin glargine 100 units/mL SubQ pen Inject 50 Units into the skin.    insulin lispro 100 unit/mL pen Inject into the skin.    LANTUS SOLOSTAR U-100 INSULIN glargine 100 units/mL SubQ pen Inject into the skin.    levocetirizine (XYZAL) 5 MG tablet Take 5 mg by mouth.    lisinopriL (PRINIVIL,ZESTRIL) 40 MG tablet Take 40 mg by mouth.    MOUNJARO 2.5 mg/0.5 mL PnIj Inject into the skin.    MOUNJARO 5 mg/0.5 mL PnIj Inject 5 mg into the skin every 7 days.    naftifine (NAFTIN) 2 % Gel     NAFTIN 2 % Gel SMARTSI Topical Daily    naproxen (NAPROSYN) 500 MG tablet Take 1 tablet (500 mg total) by mouth 2 (two) times daily with meals. Please request refill of this medication from your PCP.    pen needle, diabetic 31 gauge x " Ndle USE THREE TIMES WITH NOVOLOG INSULIN AND DAILY WITH BASAGLAR    pregabalin (LYRICA) 100 MG capsule Take 200 mg by mouth.    " rosuvastatin (CRESTOR) 20 MG tablet Take 1 tablet by mouth every morning.    spironolactone (ALDACTONE) 25 MG tablet Take 25 mg by mouth.    VRAYLAR 6 mg Cap Take 6 mg by mouth.    zolpidem (AMBIEN) 10 mg Tab Take 1 tablet by mouth nightly as needed.    clopidogreL (PLAVIX) 75 mg tablet Take 75 mg by mouth.    diclofenac sodium (VOLTAREN) 1 % Gel Apply 2 g topically 4 (four) times daily as needed (pain). Do not exceed 32 grams/day: do not to exceed 8 grams/day/single joint of upper extremities; do not to exceed 16 grams/day/single joint of lower extremities.  Please request refill of this medication from your PCP.     No current facility-administered medications for this visit.       Review of patient's allergies indicates:   Allergen Reactions    Corticosteroids (glucocorticoids) Other (See Comments)     Makes his sugar levels increase drastically.    Meperidine Itching and Nausea And Vomiting       Family History   Family history unknown: Yes       Social History     Socioeconomic History    Marital status:    Tobacco Use    Smoking status: Former     Current packs/day: 2.00     Average packs/day: 2.0 packs/day for 30.0 years (60.0 ttl pk-yrs)     Types: Cigarettes    Smokeless tobacco: Never    Tobacco comments:     Quit 2 years ago    Substance and Sexual Activity    Alcohol use: Not Currently     Comment: Quit 5 years ago    Drug use: Not Currently     Comment: Quit 5 years ago.    Sexual activity: Not Currently       Chief Complaint:   Chief Complaint   Patient presents with    Pre-op Exam     Right TAMIKO MAO sx 4/9/24       Consulting Physician: No ref. provider found    History of present illness:    This is a 54 y.o. year old male who presents today for preop evaluation for robotic assisted right total hip arthroplasty on April 9th.  No new complaints or concerns today..  He also has radiculopathy into the right buttock and right posterior thigh.  He also has diabetic neuropathy.  His advanced lumbar  "degenerative disc disease but is currently not a surgical candidate for spine surgery.  He ambulates with a cane.  He does not take opioid pain medication.  Currently he is on no over-the-counter pain medication either.  He tried ibuprofen but this does not help relieve his pain.  It also upset his stomach.  Physical therapy has been performed for several months in the past with no relief.  He states his last hemoglobin A1c was 6.9.  Review of Systems:    Constitution:   Denies chills, fever, and sweats.  HENT:   Denies headaches or blurry vision.  Cardiovascular:  Denies chest pain or irregular heart beat.  Respiratory:   Denies cough or shortness of breath.  Gastrointestinal:  Denies abdominal pain, nausea, or vomiting.  Musculoskeletal:   Denies muscle cramps.  Neurological:   Denies dizziness or focal weakness.  Psychiatric/Behavior: Normal mental status.  Hematology/Lymph:  Denies bleeding problem or easy bruising/bleeding.  Skin:    Denies rash or suspicious lesions.    Examination:    Vital Signs:    Vitals:    03/22/24 0825   BP: (!) 148/91   Pulse: 68   Weight: 125.6 kg (277 lb)   Height: 6' 1" (1.854 m)       Body mass index is 36.55 kg/m².    Constitution:   Well-developed, well nourished patient in no acute distress.  Neurological:   Alert and oriented x 3 and cooperative to examination.     Psychiatric/Behavior: Normal mental status.  Respiratory:   No shortness of breath.non labored breathing.  Cardiovascular: Regular rate and rhythm  Eyes:    Extraoccular muscles intact  Skin:    No scars, rash or suspicious lesions.    Physical Exam:  Right hip exam:   5° internal rotation  20° external rotation   90° flexion  Antalgic gait   Quad atrophy   Mild gluteal weakness compared to the left   1+ dorsal pedal and posterior tibial pulse   Intact motor function with strong dorsiflexion and plantar flexion of the great and lesser toes   Mild decreased light touch sensation dorsally and plantar in the right and " left lower extremities  Ambulates with a cane    Imaging: X-rays reviewed of AP pelvis and AP and lateral of the right hip which show protrusio and periacetabular along with femoral head osteophytes.  Patient is bone-on-bone with grade 4 changes and sclerosis in the right hip joint.  Lumbar degenerative disc disease noted on AP pelvis radiograph as well.  Impression: Advanced osteoarthrosis right hip.         Assessment: Primary osteoarthritis of right hip    Diabetes mellitus without complication    Hypertension, unspecified type    Impaired gait and mobility    Pre-operative laboratory examination    Coronary artery disease, unspecified vessel or lesion type, unspecified whether angina present, unspecified whether native or transplanted heart        Plan:  Robotic assisted right total hip arthroplasty  We will use aspirin postoperatively for DVT prophylaxis    The patient has a history of cardiac disease and is at high risk for adverse effects of surgery and anesthesia. We will admit as inpatient and expect the patient to stay 2 nights in the hospital. We will closely monitor fluid intake and output, vital signs, neurological assessments and will use telemetry if needed. We plan for discharge once patient is stable and at baseline     Considering the patient's history of diabetes, the patient is at higher risk for uncontrolled blood sugars. We will admit as inpatient and expect the patient to stay two midnight's in the hospital to monitor CBG'S AC/HS with an insulin sliding scale and monitored dietary intake and maintain tight glycemic control. We will discharge once the patient is stable and blood sugars are well controlled     Risks, benefits, alternatives, and complications were explained to the patient and/or patient representative. They understand, agree, and want to proceed with the operation/ procedure.        DISCLAIMER: This note may have been dictated using voice recognition software and may contain  grammatical errors.     NOTE: Consult report sent to referring provider via [facility] EMR.

## 2024-03-27 RX ORDER — HYDROXYZINE PAMOATE 50 MG/1
100 CAPSULE ORAL NIGHTLY PRN
COMMUNITY

## 2024-03-27 RX ORDER — GABAPENTIN 600 MG/1
600 TABLET ORAL 4 TIMES DAILY
COMMUNITY

## 2024-03-27 RX ORDER — DARIDOREXANT 25 MG/1
25 TABLET, FILM COATED ORAL NIGHTLY
COMMUNITY

## 2024-04-03 ENCOUNTER — ANESTHESIA EVENT (OUTPATIENT)
Dept: SURGERY | Facility: HOSPITAL | Age: 54
DRG: 470 | End: 2024-04-03
Payer: MEDICARE

## 2024-04-08 RX ORDER — SODIUM CHLORIDE, SODIUM GLUCONATE, SODIUM ACETATE, POTASSIUM CHLORIDE AND MAGNESIUM CHLORIDE 30; 37; 368; 526; 502 MG/100ML; MG/100ML; MG/100ML; MG/100ML; MG/100ML
INJECTION, SOLUTION INTRAVENOUS CONTINUOUS
Status: CANCELLED | OUTPATIENT
Start: 2024-04-08 | End: 2024-05-08

## 2024-04-08 NOTE — ANESTHESIA PREPROCEDURE EVALUATION
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      Review of Systems  Cardiovascular:     Hypertension   CAD                  Coronary Artery Disease:                            Hypertension         Pulmonary:        Sleep Apnea     Obstructive Sleep Apnea (AMENA).           Hepatic/GI:     GERD      Gerd          Neurological:    Neuromuscular Disease,                                 Neuromuscular Disease   Endocrine:  Diabetes    Diabetes                      Psych:  Psychiatric History                CXR    COMPARISON:  March 29, 2023     FINDINGS:  No alveolar consolidation, effusion, or pneumothorax is seen.   The thoracic aorta is normal  cardiac silhouette is enlarged, central pulmonary vessels and mediastinum are normal in size and are grossly unremarkable.   visualized osseous structures are grossly unremarkable.     Impression:     No acute chest disease is identified.     Mild cardiomegaly        Electronically signed by: Jay Padilla  Date:                                            03/22/2024  Time:                                           09:10   Latest Reference Range & Units  03/22/24 09:18   WBC 4.50 - 11.50 x10(3)/mcL 8.03   Hemoglobin 14.0 - 18.0 g/dL 14.7   Hematocrit 42.0 - 52.0 % 46.4   Platelet Count 130 - 400 x10(3)/mcL 244      Latest Reference Range & Units 03/22/24 09:18   Sodium 136 - 145 mmol/L 142   Potassium 3.5 - 5.1 mmol/L 4.4   Chloride 98 - 107 mmol/L 105   CO2 22 - 29 mmol/L 29   BUN 8.4 - 25.7 mg/dL 11.3   Creatinine 0.73 - 1.18 mg/dL 1.14   eGFR mls/min/1.73/m2 >60   Glucose 74 - 100 mg/dL 106 (H)   Calcium 8.4 - 10.2 mg/dL 9.4   (H): Data is abnormally high    Physical Exam  General: Well nourished, Cooperative, Alert and Oriented    Airway:  Mallampati: II   Mouth Opening: Normal  TM Distance: Normal  Tongue: Normal  Neck ROM: Normal ROM    Dental:  Intact    Chest/Lungs:  Clear to auscultation, Normal Respiratory Rate    Heart:  Rate: Normal  Rhythm: Regular Rhythm    Abdomen:  Rotund       Anesthesia Plan  Type of Anesthesia, risks & benefits discussed:    Anesthesia Type: Spinal  Intra-op Monitoring Plan: Standard ASA Monitors  Post Op Pain Control Plan: multimodal analgesia  Informed Consent: Patient consented to blood products? Yes  ASA Score: 3  Day of Surgery Review of History & Physical: H&P Update referred to the surgeon/provider.  Anesthesia Plan Notes: ERAS ordered by surgeon according to [facility] protocol  Goal directed IV Fluid therapy  No riley necessary unless hx of BPH/urinary retention     Ready For Surgery From Anesthesia Perspective.     .

## 2024-04-09 ENCOUNTER — HOSPITAL ENCOUNTER (INPATIENT)
Facility: HOSPITAL | Age: 54
LOS: 2 days | Discharge: HOME HEALTH | DRG: 470 | End: 2024-04-11
Attending: SPECIALIST | Admitting: SPECIALIST
Payer: MEDICARE

## 2024-04-09 ENCOUNTER — ANESTHESIA (OUTPATIENT)
Dept: SURGERY | Facility: HOSPITAL | Age: 54
DRG: 470 | End: 2024-04-09
Payer: MEDICARE

## 2024-04-09 DIAGNOSIS — E11.9 DIABETES MELLITUS WITHOUT COMPLICATION: ICD-10-CM

## 2024-04-09 DIAGNOSIS — Z01.812 PRE-OPERATIVE LABORATORY EXAMINATION: ICD-10-CM

## 2024-04-09 DIAGNOSIS — I10 HYPERTENSION, UNSPECIFIED TYPE: ICD-10-CM

## 2024-04-09 DIAGNOSIS — R26.89 IMPAIRED GAIT AND MOBILITY: ICD-10-CM

## 2024-04-09 DIAGNOSIS — M16.11 PRIMARY OSTEOARTHRITIS OF RIGHT HIP: ICD-10-CM

## 2024-04-09 LAB
HCT VFR BLD AUTO: 39.3 % (ref 42–52)
HGB BLD-MCNC: 12.3 G/DL (ref 14–18)
POCT GLUCOSE: 117 MG/DL (ref 70–110)
POCT GLUCOSE: 119 MG/DL (ref 70–110)

## 2024-04-09 PROCEDURE — 8E0Y0CZ ROBOTIC ASSISTED PROCEDURE OF LOWER EXTREMITY, OPEN APPROACH: ICD-10-PCS | Performed by: SPECIALIST

## 2024-04-09 PROCEDURE — 82962 GLUCOSE BLOOD TEST: CPT | Performed by: SPECIALIST

## 2024-04-09 PROCEDURE — 27130 TOTAL HIP ARTHROPLASTY: CPT | Mod: AS,RT,, | Performed by: PHYSICIAN ASSISTANT

## 2024-04-09 PROCEDURE — 85018 HEMOGLOBIN: CPT | Performed by: SPECIALIST

## 2024-04-09 PROCEDURE — 0SR904A REPLACEMENT OF RIGHT HIP JOINT WITH CERAMIC ON POLYETHYLENE SYNTHETIC SUBSTITUTE, UNCEMENTED, OPEN APPROACH: ICD-10-PCS | Performed by: SPECIALIST

## 2024-04-09 PROCEDURE — 25000003 PHARM REV CODE 250: Performed by: NURSE PRACTITIONER

## 2024-04-09 PROCEDURE — 51798 US URINE CAPACITY MEASURE: CPT | Performed by: SPECIALIST

## 2024-04-09 PROCEDURE — 51798 US URINE CAPACITY MEASURE: CPT

## 2024-04-09 PROCEDURE — 27130 TOTAL HIP ARTHROPLASTY: CPT | Mod: RT,,, | Performed by: SPECIALIST

## 2024-04-09 PROCEDURE — 25000003 PHARM REV CODE 250: Performed by: SPECIALIST

## 2024-04-09 PROCEDURE — 0055T BONE SRGRY CMPTR CT/MRI IMAG: CPT | Mod: ,,, | Performed by: SPECIALIST

## 2024-04-09 PROCEDURE — 63600175 PHARM REV CODE 636 W HCPCS: Performed by: SPECIALIST

## 2024-04-09 PROCEDURE — 63600175 PHARM REV CODE 636 W HCPCS

## 2024-04-09 PROCEDURE — 27201423 OPTIME MED/SURG SUP & DEVICES STERILE SUPPLY: Performed by: SPECIALIST

## 2024-04-09 PROCEDURE — D9220A PRA ANESTHESIA: Mod: ANES,,, | Performed by: ANESTHESIOLOGY

## 2024-04-09 PROCEDURE — 27000221 HC OXYGEN, UP TO 24 HOURS

## 2024-04-09 PROCEDURE — A4216 STERILE WATER/SALINE, 10 ML: HCPCS | Performed by: SPECIALIST

## 2024-04-09 PROCEDURE — 88311 DECALCIFY TISSUE: CPT

## 2024-04-09 PROCEDURE — D9220A PRA ANESTHESIA: Mod: CRNA,,,

## 2024-04-09 PROCEDURE — 88305 TISSUE EXAM BY PATHOLOGIST: CPT | Performed by: SPECIALIST

## 2024-04-09 PROCEDURE — 11000001 HC ACUTE MED/SURG PRIVATE ROOM

## 2024-04-09 PROCEDURE — 94761 N-INVAS EAR/PLS OXIMETRY MLT: CPT

## 2024-04-09 PROCEDURE — 36000712 HC OR TIME LEV V 1ST 15 MIN: Performed by: SPECIALIST

## 2024-04-09 PROCEDURE — 25000003 PHARM REV CODE 250: Performed by: PHYSICIAN ASSISTANT

## 2024-04-09 PROCEDURE — 25000003 PHARM REV CODE 250

## 2024-04-09 PROCEDURE — 63600175 PHARM REV CODE 636 W HCPCS: Mod: JZ,JG | Performed by: SPECIALIST

## 2024-04-09 PROCEDURE — 99900031 HC PATIENT EDUCATION (STAT)

## 2024-04-09 PROCEDURE — 37000009 HC ANESTHESIA EA ADD 15 MINS: Performed by: SPECIALIST

## 2024-04-09 PROCEDURE — C1713 ANCHOR/SCREW BN/BN,TIS/BN: HCPCS | Performed by: SPECIALIST

## 2024-04-09 PROCEDURE — 63600175 PHARM REV CODE 636 W HCPCS: Performed by: NURSE PRACTITIONER

## 2024-04-09 PROCEDURE — 94799 UNLISTED PULMONARY SVC/PX: CPT | Mod: XB

## 2024-04-09 PROCEDURE — 71000033 HC RECOVERY, INTIAL HOUR: Performed by: SPECIALIST

## 2024-04-09 PROCEDURE — C1776 JOINT DEVICE (IMPLANTABLE): HCPCS | Performed by: SPECIALIST

## 2024-04-09 PROCEDURE — 63600175 PHARM REV CODE 636 W HCPCS: Performed by: PHYSICIAN ASSISTANT

## 2024-04-09 PROCEDURE — 97162 PT EVAL MOD COMPLEX 30 MIN: CPT

## 2024-04-09 PROCEDURE — 37000008 HC ANESTHESIA 1ST 15 MINUTES: Performed by: SPECIALIST

## 2024-04-09 PROCEDURE — 36000713 HC OR TIME LEV V EA ADD 15 MIN: Performed by: SPECIALIST

## 2024-04-09 DEVICE — 127 DEGREE NECK ANGLE HIP STEM
Type: IMPLANTABLE DEVICE | Site: HIP | Status: FUNCTIONAL
Brand: ACCOLADE

## 2024-04-09 DEVICE — HEX DOME HOLE PLUG
Type: IMPLANTABLE DEVICE | Site: HIP | Status: FUNCTIONAL
Brand: TRIDENT II

## 2024-04-09 DEVICE — CERAMIC V40 FEMORAL HEAD
Type: IMPLANTABLE DEVICE | Site: HIP | Status: FUNCTIONAL
Brand: BIOLOX

## 2024-04-09 DEVICE — TRIDENT II TRITANIUM CLUSTER 58F
Type: IMPLANTABLE DEVICE | Site: HIP | Status: FUNCTIONAL
Brand: TRIDENT II

## 2024-04-09 DEVICE — 6.5MM LOW PROFILE HEX SCREW 35MM
Type: IMPLANTABLE DEVICE | Site: HIP | Status: FUNCTIONAL
Brand: TRIDENT II

## 2024-04-09 DEVICE — TRIDENT X3 0 DEGREE POLYETHYLENE INSERT
Type: IMPLANTABLE DEVICE | Site: HIP | Status: FUNCTIONAL
Brand: TRIDENT X3 INSERT

## 2024-04-09 DEVICE — 6.5MM LOW PROFILE HEX SCREW 20MM
Type: IMPLANTABLE DEVICE | Site: HIP | Status: FUNCTIONAL
Brand: TRIDENT II

## 2024-04-09 RX ORDER — TALC
6 POWDER (GRAM) TOPICAL NIGHTLY PRN
Status: DISCONTINUED | OUTPATIENT
Start: 2024-04-09 | End: 2024-04-09

## 2024-04-09 RX ORDER — GABAPENTIN 300 MG/1
300 CAPSULE ORAL
Status: COMPLETED | OUTPATIENT
Start: 2024-04-09 | End: 2024-04-09

## 2024-04-09 RX ORDER — SODIUM CHLORIDE, SODIUM GLUCONATE, SODIUM ACETATE, POTASSIUM CHLORIDE AND MAGNESIUM CHLORIDE 30; 37; 368; 526; 502 MG/100ML; MG/100ML; MG/100ML; MG/100ML; MG/100ML
INJECTION, SOLUTION INTRAVENOUS CONTINUOUS
Status: DISCONTINUED | OUTPATIENT
Start: 2024-04-09 | End: 2024-04-09

## 2024-04-09 RX ORDER — SODIUM CHLORIDE 9 MG/ML
INJECTION, SOLUTION INTRAVENOUS CONTINUOUS
Status: DISCONTINUED | OUTPATIENT
Start: 2024-04-09 | End: 2024-04-11 | Stop reason: HOSPADM

## 2024-04-09 RX ORDER — KETOROLAC TROMETHAMINE 30 MG/ML
15 INJECTION, SOLUTION INTRAMUSCULAR; INTRAVENOUS EVERY 6 HOURS
Status: COMPLETED | OUTPATIENT
Start: 2024-04-10 | End: 2024-04-10

## 2024-04-09 RX ORDER — SODIUM CHLORIDE 9 MG/ML
INJECTION, SOLUTION INTRAMUSCULAR; INTRAVENOUS; SUBCUTANEOUS
Status: DISCONTINUED | OUTPATIENT
Start: 2024-04-09 | End: 2024-04-09 | Stop reason: HOSPADM

## 2024-04-09 RX ORDER — GABAPENTIN 300 MG/1
300 CAPSULE ORAL 3 TIMES DAILY
Status: DISCONTINUED | OUTPATIENT
Start: 2024-04-09 | End: 2024-04-10

## 2024-04-09 RX ORDER — FLUOXETINE HYDROCHLORIDE 20 MG/1
60 CAPSULE ORAL EVERY MORNING
Status: DISCONTINUED | OUTPATIENT
Start: 2024-04-09 | End: 2024-04-09

## 2024-04-09 RX ORDER — FLUOXETINE HYDROCHLORIDE 20 MG/1
40 CAPSULE ORAL DAILY
Status: DISCONTINUED | OUTPATIENT
Start: 2024-04-10 | End: 2024-04-11 | Stop reason: HOSPADM

## 2024-04-09 RX ORDER — EPINEPHRINE 1 MG/ML
INJECTION, SOLUTION, CONCENTRATE INTRAVENOUS
Status: DISPENSED
Start: 2024-04-09 | End: 2024-04-09

## 2024-04-09 RX ORDER — FLUOXETINE HYDROCHLORIDE 20 MG/1
20 CAPSULE ORAL
Status: DISCONTINUED | OUTPATIENT
Start: 2024-04-10 | End: 2024-04-11 | Stop reason: HOSPADM

## 2024-04-09 RX ORDER — BUPIVACAINE HYDROCHLORIDE 7.5 MG/ML
INJECTION, SOLUTION EPIDURAL; RETROBULBAR
Status: COMPLETED | OUTPATIENT
Start: 2024-04-09 | End: 2024-04-09

## 2024-04-09 RX ORDER — PROPOFOL 10 MG/ML
VIAL (ML) INTRAVENOUS CONTINUOUS PRN
Status: DISCONTINUED | OUTPATIENT
Start: 2024-04-09 | End: 2024-04-09

## 2024-04-09 RX ORDER — MORPHINE SULFATE 4 MG/ML
4 INJECTION, SOLUTION INTRAMUSCULAR; INTRAVENOUS
Status: ACTIVE | OUTPATIENT
Start: 2024-04-09 | End: 2024-04-10

## 2024-04-09 RX ORDER — GABAPENTIN 300 MG/1
300 CAPSULE ORAL NIGHTLY
Status: DISCONTINUED | OUTPATIENT
Start: 2024-04-09 | End: 2024-04-09

## 2024-04-09 RX ORDER — ONDANSETRON HYDROCHLORIDE 2 MG/ML
4 INJECTION, SOLUTION INTRAVENOUS DAILY PRN
Status: DISCONTINUED | OUTPATIENT
Start: 2024-04-09 | End: 2024-04-09 | Stop reason: HOSPADM

## 2024-04-09 RX ORDER — FLUOXETINE HYDROCHLORIDE 20 MG/1
20 CAPSULE ORAL DAILY
COMMUNITY

## 2024-04-09 RX ORDER — SODIUM CHLORIDE 0.9 % (FLUSH) 0.9 %
SYRINGE (ML) INJECTION
Status: DISPENSED
Start: 2024-04-09 | End: 2024-04-09

## 2024-04-09 RX ORDER — TRAMADOL HYDROCHLORIDE 50 MG/1
50 TABLET ORAL EVERY 4 HOURS PRN
Status: DISCONTINUED | OUTPATIENT
Start: 2024-04-09 | End: 2024-04-09

## 2024-04-09 RX ORDER — ONDANSETRON HYDROCHLORIDE 2 MG/ML
4 INJECTION, SOLUTION INTRAVENOUS EVERY 6 HOURS PRN
Status: DISCONTINUED | OUTPATIENT
Start: 2024-04-09 | End: 2024-04-11 | Stop reason: HOSPADM

## 2024-04-09 RX ORDER — GABAPENTIN 300 MG/1
600 CAPSULE ORAL 4 TIMES DAILY
Status: DISCONTINUED | OUTPATIENT
Start: 2024-04-09 | End: 2024-04-09

## 2024-04-09 RX ORDER — MORPHINE SULFATE 10 MG/ML
INJECTION INTRAMUSCULAR; INTRAVENOUS; SUBCUTANEOUS
Status: DISPENSED
Start: 2024-04-09 | End: 2024-04-09

## 2024-04-09 RX ORDER — AMOXICILLIN 250 MG
2 CAPSULE ORAL 2 TIMES DAILY
Status: DISCONTINUED | OUTPATIENT
Start: 2024-04-09 | End: 2024-04-11 | Stop reason: HOSPADM

## 2024-04-09 RX ORDER — GLYCOPYRROLATE 0.2 MG/ML
INJECTION INTRAMUSCULAR; INTRAVENOUS
Status: DISCONTINUED | OUTPATIENT
Start: 2024-04-09 | End: 2024-04-09

## 2024-04-09 RX ORDER — METOCLOPRAMIDE HYDROCHLORIDE 5 MG/ML
10 INJECTION INTRAMUSCULAR; INTRAVENOUS
Status: DISCONTINUED | OUTPATIENT
Start: 2024-04-09 | End: 2024-04-10

## 2024-04-09 RX ORDER — NAPROXEN SODIUM 220 MG/1
81 TABLET, FILM COATED ORAL 2 TIMES DAILY
Status: DISCONTINUED | OUTPATIENT
Start: 2024-04-10 | End: 2024-04-11 | Stop reason: HOSPADM

## 2024-04-09 RX ORDER — POLYETHYLENE GLYCOL 3350 17 G/17G
17 POWDER, FOR SOLUTION ORAL NIGHTLY
Status: DISCONTINUED | OUTPATIENT
Start: 2024-04-09 | End: 2024-04-11 | Stop reason: HOSPADM

## 2024-04-09 RX ORDER — METHOCARBAMOL 750 MG/1
750 TABLET, FILM COATED ORAL EVERY 8 HOURS PRN
Status: DISCONTINUED | OUTPATIENT
Start: 2024-04-09 | End: 2024-04-09

## 2024-04-09 RX ORDER — ALUMINUM HYDROXIDE, MAGNESIUM HYDROXIDE, AND SIMETHICONE 1200; 120; 1200 MG/30ML; MG/30ML; MG/30ML
30 SUSPENSION ORAL EVERY 6 HOURS PRN
Status: DISCONTINUED | OUTPATIENT
Start: 2024-04-09 | End: 2024-04-11 | Stop reason: HOSPADM

## 2024-04-09 RX ORDER — KETOROLAC TROMETHAMINE 30 MG/ML
INJECTION, SOLUTION INTRAMUSCULAR; INTRAVENOUS
Status: DISCONTINUED | OUTPATIENT
Start: 2024-04-09 | End: 2024-04-09 | Stop reason: HOSPADM

## 2024-04-09 RX ORDER — DOCUSATE SODIUM 100 MG/1
200 CAPSULE, LIQUID FILLED ORAL DAILY
Status: DISCONTINUED | OUTPATIENT
Start: 2024-04-10 | End: 2024-04-11 | Stop reason: HOSPADM

## 2024-04-09 RX ORDER — PROCHLORPERAZINE EDISYLATE 5 MG/ML
5 INJECTION INTRAMUSCULAR; INTRAVENOUS EVERY 30 MIN PRN
Status: DISCONTINUED | OUTPATIENT
Start: 2024-04-09 | End: 2024-04-09 | Stop reason: HOSPADM

## 2024-04-09 RX ORDER — LIDOCAINE HYDROCHLORIDE 20 MG/ML
INJECTION, SOLUTION EPIDURAL; INFILTRATION; INTRACAUDAL; PERINEURAL
Status: DISCONTINUED | OUTPATIENT
Start: 2024-04-09 | End: 2024-04-09

## 2024-04-09 RX ORDER — BISACODYL 10 MG/1
10 SUPPOSITORY RECTAL DAILY
Status: DISCONTINUED | OUTPATIENT
Start: 2024-04-12 | End: 2024-04-11 | Stop reason: HOSPADM

## 2024-04-09 RX ORDER — KETOROLAC TROMETHAMINE 30 MG/ML
INJECTION, SOLUTION INTRAMUSCULAR; INTRAVENOUS
Status: DISPENSED
Start: 2024-04-09 | End: 2024-04-09

## 2024-04-09 RX ORDER — KETOROLAC TROMETHAMINE 10 MG/1
10 TABLET, FILM COATED ORAL
Status: COMPLETED | OUTPATIENT
Start: 2024-04-09 | End: 2024-04-09

## 2024-04-09 RX ORDER — SPIRONOLACTONE 25 MG/1
25 TABLET ORAL DAILY
Status: DISCONTINUED | OUTPATIENT
Start: 2024-04-09 | End: 2024-04-09

## 2024-04-09 RX ORDER — SCOLOPAMINE TRANSDERMAL SYSTEM 1 MG/1
1 PATCH, EXTENDED RELEASE TRANSDERMAL ONCE AS NEEDED
Status: DISCONTINUED | OUTPATIENT
Start: 2024-04-09 | End: 2024-04-09 | Stop reason: HOSPADM

## 2024-04-09 RX ORDER — HYDROMORPHONE HYDROCHLORIDE 2 MG/ML
0.4 INJECTION, SOLUTION INTRAMUSCULAR; INTRAVENOUS; SUBCUTANEOUS EVERY 5 MIN PRN
Status: DISCONTINUED | OUTPATIENT
Start: 2024-04-09 | End: 2024-04-09 | Stop reason: HOSPADM

## 2024-04-09 RX ORDER — CEFAZOLIN SODIUM IN 0.9 % NACL 3 G/100 ML
3 INTRAVENOUS SOLUTION, PIGGYBACK (ML) INTRAVENOUS
Status: COMPLETED | OUTPATIENT
Start: 2024-04-09 | End: 2024-04-09

## 2024-04-09 RX ORDER — KETOROLAC TROMETHAMINE 30 MG/ML
15 INJECTION, SOLUTION INTRAMUSCULAR; INTRAVENOUS EVERY 6 HOURS
Status: DISCONTINUED | OUTPATIENT
Start: 2024-04-09 | End: 2024-04-09

## 2024-04-09 RX ORDER — LISINOPRIL 10 MG/1
40 TABLET ORAL DAILY
Status: DISCONTINUED | OUTPATIENT
Start: 2024-04-09 | End: 2024-04-11 | Stop reason: HOSPADM

## 2024-04-09 RX ORDER — ROPIVACAINE HYDROCHLORIDE 5 MG/ML
INJECTION, SOLUTION EPIDURAL; INFILTRATION; PERINEURAL
Status: DISPENSED
Start: 2024-04-09 | End: 2024-04-09

## 2024-04-09 RX ORDER — ONDANSETRON HYDROCHLORIDE 2 MG/ML
INJECTION, SOLUTION INTRAVENOUS
Status: DISCONTINUED | OUTPATIENT
Start: 2024-04-09 | End: 2024-04-09

## 2024-04-09 RX ORDER — KETOROLAC TROMETHAMINE 30 MG/ML
30 INJECTION, SOLUTION INTRAMUSCULAR; INTRAVENOUS ONCE
Status: COMPLETED | OUTPATIENT
Start: 2024-04-09 | End: 2024-04-09

## 2024-04-09 RX ORDER — ACETAMINOPHEN 10 MG/ML
1000 INJECTION, SOLUTION INTRAVENOUS ONCE
Status: COMPLETED | OUTPATIENT
Start: 2024-04-09 | End: 2024-04-09

## 2024-04-09 RX ORDER — FAMOTIDINE 20 MG/1
20 TABLET, FILM COATED ORAL ONCE
Status: COMPLETED | OUTPATIENT
Start: 2024-04-10 | End: 2024-04-10

## 2024-04-09 RX ORDER — HYDROCODONE BITARTRATE AND ACETAMINOPHEN 5; 325 MG/1; MG/1
1 TABLET ORAL EVERY 4 HOURS PRN
Status: DISCONTINUED | OUTPATIENT
Start: 2024-04-09 | End: 2024-04-09

## 2024-04-09 RX ORDER — PHENYLEPHRINE HYDROCHLORIDE 10 MG/ML
INJECTION INTRAVENOUS
Status: DISCONTINUED | OUTPATIENT
Start: 2024-04-09 | End: 2024-04-09

## 2024-04-09 RX ORDER — FAMOTIDINE 20 MG/1
20 TABLET, FILM COATED ORAL 2 TIMES DAILY
Status: DISCONTINUED | OUTPATIENT
Start: 2024-04-09 | End: 2024-04-09

## 2024-04-09 RX ORDER — SODIUM CHLORIDE, SODIUM LACTATE, POTASSIUM CHLORIDE, CALCIUM CHLORIDE 600; 310; 30; 20 MG/100ML; MG/100ML; MG/100ML; MG/100ML
INJECTION, SOLUTION INTRAVENOUS CONTINUOUS
Status: DISCONTINUED | OUTPATIENT
Start: 2024-04-09 | End: 2024-04-09

## 2024-04-09 RX ORDER — TRANEXAMIC ACID 650 MG/1
1950 TABLET ORAL
Status: COMPLETED | OUTPATIENT
Start: 2024-04-09 | End: 2024-04-09

## 2024-04-09 RX ORDER — HYDROCODONE BITARTRATE AND ACETAMINOPHEN 7.5; 325 MG/1; MG/1
1 TABLET ORAL EVERY 4 HOURS PRN
Status: DISCONTINUED | OUTPATIENT
Start: 2024-04-09 | End: 2024-04-11

## 2024-04-09 RX ORDER — HYDROCODONE BITARTRATE AND ACETAMINOPHEN 5; 325 MG/1; MG/1
1 TABLET ORAL
Status: DISCONTINUED | OUTPATIENT
Start: 2024-04-09 | End: 2024-04-09

## 2024-04-09 RX ORDER — MIDAZOLAM HYDROCHLORIDE 1 MG/ML
INJECTION INTRAMUSCULAR; INTRAVENOUS
Status: DISCONTINUED | OUTPATIENT
Start: 2024-04-09 | End: 2024-04-09

## 2024-04-09 RX ORDER — METHOCARBAMOL 750 MG/1
750 TABLET, FILM COATED ORAL EVERY 6 HOURS PRN
Status: DISCONTINUED | OUTPATIENT
Start: 2024-04-09 | End: 2024-04-10

## 2024-04-09 RX ORDER — CARVEDILOL 6.25 MG/1
12.5 TABLET ORAL 2 TIMES DAILY
Status: DISCONTINUED | OUTPATIENT
Start: 2024-04-09 | End: 2024-04-11 | Stop reason: HOSPADM

## 2024-04-09 RX ORDER — ROPIVACAINE HYDROCHLORIDE 5 MG/ML
INJECTION, SOLUTION EPIDURAL; INFILTRATION; PERINEURAL
Status: DISCONTINUED | OUTPATIENT
Start: 2024-04-09 | End: 2024-04-09 | Stop reason: HOSPADM

## 2024-04-09 RX ORDER — ONDANSETRON 4 MG/1
4 TABLET, ORALLY DISINTEGRATING ORAL
Status: COMPLETED | OUTPATIENT
Start: 2024-04-09 | End: 2024-04-09

## 2024-04-09 RX ORDER — HYDROXYZINE PAMOATE 50 MG/1
50 CAPSULE ORAL NIGHTLY
Status: DISCONTINUED | OUTPATIENT
Start: 2024-04-09 | End: 2024-04-10

## 2024-04-09 RX ORDER — LACTULOSE 10 G/15ML
20 SOLUTION ORAL EVERY 6 HOURS PRN
Status: DISCONTINUED | OUTPATIENT
Start: 2024-04-09 | End: 2024-04-11 | Stop reason: HOSPADM

## 2024-04-09 RX ORDER — ACETAMINOPHEN 500 MG
500 TABLET ORAL EVERY 4 HOURS
Status: DISCONTINUED | OUTPATIENT
Start: 2024-04-09 | End: 2024-04-10

## 2024-04-09 RX ORDER — GABAPENTIN 300 MG/1
300 CAPSULE ORAL 3 TIMES DAILY
Status: DISCONTINUED | OUTPATIENT
Start: 2024-04-09 | End: 2024-04-09

## 2024-04-09 RX ORDER — EPHEDRINE SULFATE 50 MG/ML
INJECTION, SOLUTION INTRAVENOUS
Status: DISCONTINUED | OUTPATIENT
Start: 2024-04-09 | End: 2024-04-09

## 2024-04-09 RX ORDER — HYDROCODONE BITARTRATE AND ACETAMINOPHEN 5; 325 MG/1; MG/1
1 TABLET ORAL EVERY 4 HOURS PRN
Status: DISCONTINUED | OUTPATIENT
Start: 2024-04-09 | End: 2024-04-11

## 2024-04-09 RX ORDER — MORPHINE SULFATE 10 MG/ML
INJECTION INTRAMUSCULAR; INTRAVENOUS; SUBCUTANEOUS
Status: DISCONTINUED | OUTPATIENT
Start: 2024-04-09 | End: 2024-04-09 | Stop reason: HOSPADM

## 2024-04-09 RX ORDER — ACETAMINOPHEN 500 MG
1000 TABLET ORAL
Status: COMPLETED | OUTPATIENT
Start: 2024-04-09 | End: 2024-04-09

## 2024-04-09 RX ORDER — EPINEPHRINE 1 MG/ML
INJECTION, SOLUTION, CONCENTRATE INTRAVENOUS
Status: DISCONTINUED | OUTPATIENT
Start: 2024-04-09 | End: 2024-04-09 | Stop reason: HOSPADM

## 2024-04-09 RX ADMIN — TRANEXAMIC ACID 1950 MG: 650 TABLET ORAL at 06:04

## 2024-04-09 RX ADMIN — SENNOSIDES AND DOCUSATE SODIUM 2 TABLET: 8.6; 5 TABLET ORAL at 09:04

## 2024-04-09 RX ADMIN — METOCLOPRAMIDE 10 MG: 5 INJECTION, SOLUTION INTRAMUSCULAR; INTRAVENOUS at 05:04

## 2024-04-09 RX ADMIN — HYDROCODONE BITARTRATE AND ACETAMINOPHEN 1 TABLET: 7.5; 325 TABLET ORAL at 09:04

## 2024-04-09 RX ADMIN — ONDANSETRON 4 MG: 4 TABLET, ORALLY DISINTEGRATING ORAL at 06:04

## 2024-04-09 RX ADMIN — KETOROLAC TROMETHAMINE 15 MG: 30 INJECTION, SOLUTION INTRAMUSCULAR at 03:04

## 2024-04-09 RX ADMIN — PROPOFOL 100 MCG/KG/MIN: 10 INJECTION, EMULSION INTRAVENOUS at 07:04

## 2024-04-09 RX ADMIN — BUSPIRONE HYDROCHLORIDE 15 MG: 10 TABLET ORAL at 09:04

## 2024-04-09 RX ADMIN — CEFAZOLIN 2 G: 2 INJECTION, POWDER, FOR SOLUTION INTRAMUSCULAR; INTRAVENOUS at 09:04

## 2024-04-09 RX ADMIN — PHENYLEPHRINE HYDROCHLORIDE 40 MCG/MIN: 10 INJECTION INTRAVENOUS at 08:04

## 2024-04-09 RX ADMIN — SODIUM CHLORIDE: 9 INJECTION, SOLUTION INTRAVENOUS at 10:04

## 2024-04-09 RX ADMIN — KETOROLAC TROMETHAMINE 30 MG: 30 INJECTION, SOLUTION INTRAMUSCULAR at 09:04

## 2024-04-09 RX ADMIN — GABAPENTIN 600 MG: 300 CAPSULE ORAL at 12:04

## 2024-04-09 RX ADMIN — SODIUM CHLORIDE, SODIUM GLUCONATE, SODIUM ACETATE, POTASSIUM CHLORIDE AND MAGNESIUM CHLORIDE: 526; 502; 368; 37; 30 INJECTION, SOLUTION INTRAVENOUS at 09:04

## 2024-04-09 RX ADMIN — ACETAMINOPHEN 1000 MG: 500 TABLET ORAL at 06:04

## 2024-04-09 RX ADMIN — BUPIVACAINE HYDROCHLORIDE 2 ML: 7.5 INJECTION, SOLUTION EPIDURAL; RETROBULBAR at 07:04

## 2024-04-09 RX ADMIN — Medication 3 G: at 08:04

## 2024-04-09 RX ADMIN — GABAPENTIN 300 MG: 300 CAPSULE ORAL at 09:04

## 2024-04-09 RX ADMIN — MIDAZOLAM 2 MG: 1 INJECTION INTRAMUSCULAR; INTRAVENOUS at 07:04

## 2024-04-09 RX ADMIN — SODIUM CHLORIDE, SODIUM GLUCONATE, SODIUM ACETATE, POTASSIUM CHLORIDE AND MAGNESIUM CHLORIDE: 526; 502; 368; 37; 30 INJECTION, SOLUTION INTRAVENOUS at 07:04

## 2024-04-09 RX ADMIN — METHOCARBAMOL 750 MG: 750 TABLET ORAL at 03:04

## 2024-04-09 RX ADMIN — HYDROCODONE BITARTRATE AND ACETAMINOPHEN 1 TABLET: 5; 325 TABLET ORAL at 05:04

## 2024-04-09 RX ADMIN — METOCLOPRAMIDE 10 MG: 5 INJECTION, SOLUTION INTRAMUSCULAR; INTRAVENOUS at 11:04

## 2024-04-09 RX ADMIN — METOCLOPRAMIDE 10 MG: 5 INJECTION, SOLUTION INTRAMUSCULAR; INTRAVENOUS at 12:04

## 2024-04-09 RX ADMIN — BUSPIRONE HYDROCHLORIDE 15 MG: 10 TABLET ORAL at 03:04

## 2024-04-09 RX ADMIN — PROPOFOL 70 MG: 10 INJECTION, EMULSION INTRAVENOUS at 08:04

## 2024-04-09 RX ADMIN — LIDOCAINE HYDROCHLORIDE 50 MG: 20 INJECTION, SOLUTION EPIDURAL; INFILTRATION; INTRACAUDAL; PERINEURAL at 08:04

## 2024-04-09 RX ADMIN — HYDROXYZINE PAMOATE 50 MG: 50 CAPSULE ORAL at 09:04

## 2024-04-09 RX ADMIN — GLYCOPYRROLATE 0.2 MG: 0.2 INJECTION INTRAMUSCULAR; INTRAVENOUS at 07:04

## 2024-04-09 RX ADMIN — KETOROLAC TROMETHAMINE 10 MG: 10 TABLET, FILM COATED ORAL at 06:04

## 2024-04-09 RX ADMIN — ONDANSETRON HYDROCHLORIDE 4 MG: 2 SOLUTION INTRAMUSCULAR; INTRAVENOUS at 07:04

## 2024-04-09 RX ADMIN — CEFAZOLIN 2 G: 2 INJECTION, POWDER, FOR SOLUTION INTRAMUSCULAR; INTRAVENOUS at 02:04

## 2024-04-09 RX ADMIN — POLYETHYLENE GLYCOL 3350 17 G: 17 POWDER, FOR SOLUTION ORAL at 09:04

## 2024-04-09 RX ADMIN — SODIUM CHLORIDE, SODIUM GLUCONATE, SODIUM ACETATE, POTASSIUM CHLORIDE AND MAGNESIUM CHLORIDE: 526; 502; 368; 37; 30 INJECTION, SOLUTION INTRAVENOUS at 06:04

## 2024-04-09 RX ADMIN — TRAMADOL HYDROCHLORIDE 50 MG: 50 TABLET, COATED ORAL at 12:04

## 2024-04-09 RX ADMIN — GABAPENTIN 300 MG: 300 CAPSULE ORAL at 06:04

## 2024-04-09 RX ADMIN — EPHEDRINE SULFATE 10 MG: 50 INJECTION INTRAVENOUS at 09:04

## 2024-04-09 RX ADMIN — CARVEDILOL 12.5 MG: 6.25 TABLET, FILM COATED ORAL at 09:04

## 2024-04-09 RX ADMIN — ACETAMINOPHEN 1000 MG: 10 INJECTION INTRAVENOUS at 03:04

## 2024-04-09 RX ADMIN — PHENYLEPHRINE HYDROCHLORIDE 100 MCG: 10 INJECTION INTRAVENOUS at 08:04

## 2024-04-09 RX ADMIN — METHOCARBAMOL 750 MG: 750 TABLET ORAL at 11:04

## 2024-04-09 NOTE — OP NOTE
DATE OF PROCEDURE: 04/09/2024    PREOPERATIVE DIAGNOSIS: Arthritis,right hip.  Protrusio acetabulum right hip  POSTOPERATIVE DIAGNOSIS: Arthritis, right hip.  Protrusio acetabulum right hip  PROCEDURES PERFORMED: right total hip arthroplasty with robotic navigation.  Acetabular impaction bone grafting due to native protrusio right hip  SURGEON: Chi Joe M.D.   ASSISTANT: First assistant:Cali Moser, certified physician assistant, who was necessary and essential for all aspects of the operation, including but no limited to patient positioning, surgical exposure, bony preparation, implantation, wound closure, and dressing placement.    ANESTHESIA: spinal    SPECIMEN: Femoral Head  FINDINGS: Arthritis  BLOOD LOSS:  135 mL  COMPLICATIONS: None.   COUNTS: Correct.   DISPOSITION: Recovery Room, stable.   IMPLANTS: [place] Trident II size 58 mm acetabular component with screws x3 and with a 36 mm x neutral liner, [place] Accolade IIsize 6 ,    hip stem with a 36 mm +5 mm neck length Biolox femoral head.   INDICATIONS FOR PROCEDURE: Cam Pang is a 54 y.o. year old male  who is having   symptoms of right hip pain. Physical examination and imaging studies were   consistent with right hip arthritis. He did not respond to nonoperative   treatment measures. Treatment options were explained to the patient. He   decided to proceed with right total hip arthroplasty with robotic assistance.   He was aware of reasonable treatment options as well as risks and benefits, and   elected to undergo the procedure.   PROCEDURE IN DETAIL: After appropriate consent was obtained, the patient was   brought in the Operating Room, anesthesia was administered.  Prior to this, the patient received antibiotic prophylaxis.   He was then positioned in the lateral decubitus position with the right hip   pointed upward. Axillary roll was placed. Bony prominences were well padded.   Pegboard positioning system was utilized. The  right hip and lower extremity   were then prepped and draped in the usual sterile fashion. A time out was called.  There were no concerns expressed by members of the team, and the correct side was confirmed. Three small incisions were made over the iliac crest, followed by pin placement. The pelvic array was assembled and registered.  Anterolateral   approach to the hip was made. Incision was made over the greater trochanter and was taken down through the skin and tensor fascia.   The tensor fascia was split in line with the skin incision. Skin bleeders were coagulated with cautery. The sciatic nerve   was palpated and protected, it was out of harm's way and the Charnley retractor was placed. Femoral checkpoint was placed and registered.The anterolateral approach was performed by incising the anterior one-fourth of the gluteus medius while leaving a tendinous cuff for later repair. I then incised the gluteus minimus superiorly in line with the neck of the femur. A posterior capsulotomy was performed along the neck of the femur. The hip was externally rotated until the fibers of the vastus intermedius were identified. Retractors were repositioned to expose the anterior capsule and an anterior capsulotomy was performed. The hip was then externally rotated, dislocated, and a femoral neck osteotomy was made at the appropriate level. The femoral head and acetabulum were denuded of articular cartilage down to subchondral bone. Acetabular retractors were placed and the labrum was excised. The pelvic checkpoint was then registered followed by fine point registration of the acetabulum. Reaming was then performed to the planned depth and position robotically.  Socket was irrigated and suctioned and then autograft from the reamings was used for medial and inferior impaction bone grafting by using the Reamer on reverse due to the patient's native protrusio of the acetabulum.  Utilizing the robotic interactive arm, the cup was  impacted in 45° of abduction and 20° of anteversion. There was an excellent pressfit of the acetabular component.  Screws x3 were placed through the component into the ilium and ischium for adjunctive fixation.  The liner was then pressfit into the cup.  The hip was then repositioned to expose the proximal femur. The box osteotome was used to lateralize the starting point on the femur, followed by the awl to open up the canal.  Sequential broaching was performed up to a  6 broach. Trialing was performed with a 127-degree neck angle stem in 10 -degrees of anteversion.  The hip was reduced and there was excellent stability and restoration of leg lengths and offset, with no dislocation, subluxation, or impingement. At this point, the hip was dislocated with the aid of a neck hook.   The femoral trial component was removed. The actual femoral component was   firmly seated. The neck and calcar was inspected.  There was no crack or fracture. I remeasured at this point and, once again, we needed the +5 mm neck length 36 mm diameter Biolox femoral  head. The head was then impacted onto a clean, dry trunion. The   acetabulum was inspected. There was no loose body, foreign body, or soft tissue   interposition. The hip was then reduced, brought through range of motion, and   stable as previously described. At this point, the hip was soaked in a dilute betadine solution for three minutes, then irrigated. The soft tissues and capsule were then injected for postoperative pain control. Three small drill holes were made in the proximal femur, and through these drill holes the gluteus minimus was repaired the its insertion. The anterior one-fourth of the gluteus medius was repaired to its tendinous cuff. A looped PDS suture was used to close the iliotibial band, followed by reapproximation of the skin with 2-0 vicryl suture. A running 4-0 monocryl suture was used for skin closure along with steri-strips. Sterile dressings were  applied along with an abduction pillow, and the patient was taken to recovery room in stable condition.

## 2024-04-09 NOTE — ANESTHESIA POSTPROCEDURE EVALUATION
Anesthesia Post Evaluation    Patient: Cam Pang    Procedure(s) Performed: Procedure(s) (LRB):  ROBOTIC ARTHROPLASTY,HIP (Right)    Final Anesthesia Type: spinal      Patient location during evaluation: PACU  Patient participation: Yes- Able to Participate  Level of consciousness: awake and alert  Post-procedure vital signs: reviewed and stable  Pain management: adequate  Airway patency: patent    PONV status at discharge: No PONV  Anesthetic complications: no      Cardiovascular status: blood pressure returned to baseline and hemodynamically stable  Respiratory status: unassisted and spontaneous ventilation                Vitals Value Taken Time   /84 04/09/24 1220   Temp 35.8 °C (96.5 °F) 04/09/24 1052   Pulse 51 04/09/24 1220   Resp 18 04/09/24 1211   SpO2 98 % 04/09/24 1220         Event Time   Out of Recovery 10:42:00         Pain/Shyla Score: Pain Rating Prior to Med Admin: 4 (4/9/2024 12:11 PM)  Shyla Score: 8 (4/9/2024 10:38 AM)

## 2024-04-09 NOTE — PLAN OF CARE
Problem: Adult Inpatient Plan of Care  Goal: Plan of Care Review  Outcome: Ongoing, Progressing  Goal: Patient-Specific Goal (Individualized)  Outcome: Ongoing, Progressing  Goal: Absence of Hospital-Acquired Illness or Injury  Outcome: Ongoing, Progressing  Goal: Optimal Comfort and Wellbeing  Outcome: Ongoing, Progressing  Goal: Readiness for Transition of Care  Outcome: Ongoing, Progressing     Problem: Diabetes Comorbidity  Goal: Blood Glucose Level Within Targeted Range  Outcome: Ongoing, Progressing     Problem: Infection  Goal: Absence of Infection Signs and Symptoms  Outcome: Ongoing, Progressing     Problem: Hypertension Comorbidity  Goal: Blood Pressure in Desired Range  Outcome: Ongoing, Progressing     Problem: Obstructive Sleep Apnea Risk or Actual Comorbidity Management  Goal: Unobstructed Breathing During Sleep  Outcome: Ongoing, Progressing     Problem: Pain Acute  Goal: Acceptable Pain Control and Functional Ability  Outcome: Ongoing, Progressing     Problem: Adjustment to Surgery (Hip Arthroplasty)  Goal: Optimal Coping  Outcome: Ongoing, Progressing     Problem: Bleeding (Hip Arthroplasty)  Goal: Absence of Bleeding  Outcome: Ongoing, Progressing     Problem: Bowel Motility Impaired (Hip Arthroplasty)  Goal: Effective Bowel Elimination  Outcome: Ongoing, Progressing     Problem: Fluid and Electrolyte Imbalance (Hip Arthroplasty)  Goal: Fluid and Electrolyte Balance  Outcome: Ongoing, Progressing     Problem: Functional Ability Impaired (Hip Arthroplasty)  Goal: Optimal Functional Ability  Outcome: Ongoing, Progressing     Problem: Infection (Hip Arthroplasty)  Goal: Absence of Infection Signs and Symptoms  Outcome: Ongoing, Progressing     Problem: Neurovascular Compromise (Hip Arthroplasty)  Goal: Intact Neurovascular Status  Outcome: Ongoing, Progressing     Problem: Ongoing Anesthesia Effects (Hip Arthroplasty)  Goal: Anesthesia/Sedation Recovery  Outcome: Ongoing, Progressing     Problem:  Pain (Hip Arthroplasty)  Goal: Acceptable Pain Control  Outcome: Ongoing, Progressing     Problem: Postoperative Nausea and Vomiting (Hip Arthroplasty)  Goal: Nausea and Vomiting Relief  Outcome: Ongoing, Progressing     Problem: Postoperative Urinary Retention (Hip Arthroplasty)  Goal: Effective Urinary Elimination  Outcome: Ongoing, Progressing     Problem: Respiratory Compromise (Hip Arthroplasty)  Goal: Effective Oxygenation and Ventilation  Outcome: Ongoing, Progressing     Problem: Fall Injury Risk  Goal: Absence of Fall and Fall-Related Injury  Outcome: Ongoing, Progressing

## 2024-04-09 NOTE — ANESTHESIA PROCEDURE NOTES
Spinal    Diagnosis: Osteoarthritis  Patient location during procedure: OR  Start time: 4/9/2024 7:52 AM  Timeout: 4/9/2024 7:51 AM  End time: 4/9/2024 7:56 AM    Staffing  Authorizing Provider: Tamela Vernon MD  Performing Provider: Tamela Vernon MD    Staffing  Performed by: Tamela Vernon MD  Authorized by: Tamela Vernon MD    Preanesthetic Checklist  Completed: patient identified, IV checked, site marked, risks and benefits discussed, surgical consent, monitors and equipment checked, pre-op evaluation and timeout performed  Spinal Block  Patient position: sitting  Prep: ChloraPrep  Patient monitoring: heart rate, cardiac monitor, continuous pulse ox and frequent blood pressure checks  Approach: midline  Location: L3-4  Injection technique: single shot  CSF Fluid: clear free-flowing CSF  Needle  Needle type: pencil-tip   Needle gauge: 25 G  Needle length: 3.5 in  Additional Documentation: incremental injection, negative aspiration for heme and no paresthesia on injection  Needle localization: anatomical landmarks  Assessment  Ease of block: easy  Patient's tolerance of the procedure: comfortable throughout block and no complaints  Medications:    Medications: bupivacaine (pf) (MARCAINE) injection 0.75% - Intraspinal   2 mL - 4/9/2024 7:57:00 AM

## 2024-04-09 NOTE — PT/OT/SLP EVAL
Physical Therapy Evaluation    Patient Name:  Cam Pang   MRN:  [id number]    Recommendations:     Discharge Recommendations: Low Intensity Therapy   Discharge Equipment Recommendations: walker, rolling   Barriers to discharge: None    Assessment:     Cam Pang is a 54 y.o. male admitted with a medical diagnosis of Primary osteoarthritis of right hip.  He presents with the following impairments/functional limitations: weakness, impaired endurance, impaired functional mobility, decreased lower extremity function, pain, decreased ROM, edema, orthopedic precautions .    Rehab Prognosis: Fair; patient would benefit from acute skilled PT services to address these deficits and reach maximum level of function.    Recent Surgery: Procedure(s) (LRB):  ROBOTIC ARTHROPLASTY,HIP (Right) 1 Day Post-Op    Plan:     During this hospitalization, patient to be seen BID to address the identified rehab impairments via gait training, therapeutic activities, therapeutic exercises and progress toward the following goals:    Plan of Care Expires:  04/15/24    Subjective     Chief Complaint: R hip pain  Patient/Family Comments/goals:   Pain/Comfort:       Patients cultural, spiritual, Jew conflicts given the current situation:      Living Environment:  Pt lives in single story home alone, no steps to enter.  Prior to admission, patients level of function was mod ind.  Equipment used at home: rollator.  DME owned (not currently used): none.  Upon discharge, patient will have assistance from mom.    Objective:     Communicated with nurse  prior to session.  Patient found supine with peripheral IV, telemetry  upon PT entry to room.    General Precautions: Standard, fall  Orthopedic Precautions:RLE partial weight bearing, RLE anterior precautions   Braces:    Respiratory Status: Room air      Exams:  RLE ROM: NT dt sx side  RLE Strength:NT dt sx side  LLE ROM: WFL  LLE Strength: WFL    Functional  Mobility:  Bed Mobility:     Supine to Sit: moderate assistance; pt c/o dizziness prior to standing; BP: 114/71, HR: 59, pt placed in trendelenburg and reported improvement of symptoms, BP: 106/68, HR 56 when returned to HOB raised, 111/67 at EOB  Transfers:     Sit to Stand:  minimum assistance with rolling walker; BP: 11 upon standing, HR: 60   Gait: Pt ambulated 8 ft w rw and min A, using step through gait pattern at slow pace. Pt complained of dizziness/ nausea and unable to continue ambulation, BP: 113/69 following ambulation, HR 60        Treatment & Education:  Pt edu on total hip precautions, partial WB status and importance of frequent mobility  Pt completed prehab prior to sx.    Patient left up in chair with all lines intact, call button in reach, nurse notified, and family present.    GOALS:   Multidisciplinary Problems       Physical Therapy Goals          Problem: Physical Therapy    Goal Priority Disciplines Outcome Goal Variances Interventions   Physical Therapy Goal     PT, PT/OT Ongoing, Progressing     Description: Pt will improve functional independence by performing:    Bed mobility: SBA  Sit to stand: SBA with rolling walker  Bed to chair t/f: SBA with Stand Step  with rolling walker  Ambulation x 200'  with SBA with rolling walker  1 Step (Curb): Min A  with rolling walker  3 Steps: Min A  with B HR  Independent with total hip HEP                        History:     Past Medical History:   Diagnosis Date    Coronary artery disease     Depression     Diabetes mellitus, type 2     Hypertension     Primary osteoarthritis of right hip 04/09/2024    Schizophrenia, unspecified     Sleep apnea, unspecified     cpap       Past Surgical History:   Procedure Laterality Date    APPENDECTOMY      arm fracture surgery Left     cardiac stents      2x    ORIF FACIAL FRACTURE Left     ROBOTIC ARTHROPLASTY, HIP Right 4/9/2024    Procedure: ROBOTIC ARTHROPLASTY,HIP;  Surgeon: Ke Joe MD;  Location: [facility]  OR;  Service: Orthopedics;  Laterality: Right;  Rk    SINUS SURGERY         Time Tracking:     PT Received On:    PT Start Time: 1421     PT Stop Time: 1452  PT Total Time (min): 31 min     Billable Minutes: Evaluation 31      04/10/2024

## 2024-04-09 NOTE — TRANSFER OF CARE
"Anesthesia Transfer of Care Note    Patient: Cam Pang    Procedure(s) Performed: Procedure(s) (LRB):  ROBOTIC ARTHROPLASTY,HIP (Right)    Patient location: PACU    Anesthesia Type: spinal and MAC    Transport from OR: Transported from OR on room air with adequate spontaneous ventilation    Post pain: adequate analgesia    Post assessment: no apparent anesthetic complications and tolerated procedure well    Post vital signs: stable    Level of consciousness: awake    Nausea/Vomiting: no nausea/vomiting    Complications: none    Transfer of care protocol was followed      Last vitals: Visit Vitals  BP (!) 161/97 (BP Location: Left arm, Patient Position: Lying)   Pulse 60   Temp 35.8 °C (96.4 °F) (Tympanic)   Resp 20   Ht 6' 1" (1.854 m)   Wt 125.3 kg (276 lb 3.8 oz)   SpO2 96%   BMI 36.44 kg/m²     "

## 2024-04-09 NOTE — NURSING
Nurses Note -- 4 Eyes      4/9/2024   11:05 AM      Skin assessed during: Admit      [] No Altered Skin Integrity Present    []Prevention Measures Documented      [x] Yes- Altered Skin Integrity Present or Discovered   [] LDA Added if Not in Epic (Describe Wound)   [x] New Altered Skin Integrity was Present on Admit and Documented in LDA   [] Wound Image Taken    Wound Care Consulted? No    Attending Nurse:  Heidy Haywood RN/Staff Member:   Yana

## 2024-04-10 LAB
ANION GAP SERPL CALC-SCNC: 6 MEQ/L
BUN SERPL-MCNC: 15.7 MG/DL (ref 8.4–25.7)
CALCIUM SERPL-MCNC: 8.5 MG/DL (ref 8.4–10.2)
CHLORIDE SERPL-SCNC: 109 MMOL/L (ref 98–107)
CO2 SERPL-SCNC: 27 MMOL/L (ref 22–29)
CREAT SERPL-MCNC: 1 MG/DL (ref 0.73–1.18)
CREAT/UREA NIT SERPL: 16
ERYTHROCYTE [DISTWIDTH] IN BLOOD BY AUTOMATED COUNT: 12.7 % (ref 11.5–17)
GFR SERPLBLD CREATININE-BSD FMLA CKD-EPI: >60 MLS/MIN/1.73/M2
GLUCOSE SERPL-MCNC: 144 MG/DL (ref 74–100)
HCT VFR BLD AUTO: 39.9 % (ref 42–52)
HGB BLD-MCNC: 12.2 G/DL (ref 14–18)
MCH RBC QN AUTO: 30.9 PG (ref 27–31)
MCHC RBC AUTO-ENTMCNC: 30.6 G/DL (ref 33–36)
MCV RBC AUTO: 101 FL (ref 80–94)
NRBC BLD AUTO-RTO: 0 %
PLATELET # BLD AUTO: 195 X10(3)/MCL (ref 130–400)
PMV BLD AUTO: 10.1 FL (ref 7.4–10.4)
POCT GLUCOSE: 119 MG/DL (ref 70–110)
POCT GLUCOSE: 159 MG/DL (ref 70–110)
POTASSIUM SERPL-SCNC: 4.8 MMOL/L (ref 3.5–5.1)
RBC # BLD AUTO: 3.95 X10(6)/MCL (ref 4.7–6.1)
SODIUM SERPL-SCNC: 142 MMOL/L (ref 136–145)
WBC # SPEC AUTO: 10.52 X10(3)/MCL (ref 4.5–11.5)

## 2024-04-10 PROCEDURE — 11000001 HC ACUTE MED/SURG PRIVATE ROOM

## 2024-04-10 PROCEDURE — 63600175 PHARM REV CODE 636 W HCPCS: Performed by: SPECIALIST

## 2024-04-10 PROCEDURE — 63600175 PHARM REV CODE 636 W HCPCS: Performed by: NURSE PRACTITIONER

## 2024-04-10 PROCEDURE — 25000003 PHARM REV CODE 250: Performed by: SPECIALIST

## 2024-04-10 PROCEDURE — 97116 GAIT TRAINING THERAPY: CPT | Mod: CQ

## 2024-04-10 PROCEDURE — 80048 BASIC METABOLIC PNL TOTAL CA: CPT | Performed by: SPECIALIST

## 2024-04-10 PROCEDURE — 97166 OT EVAL MOD COMPLEX 45 MIN: CPT

## 2024-04-10 PROCEDURE — 97530 THERAPEUTIC ACTIVITIES: CPT | Mod: CQ

## 2024-04-10 PROCEDURE — 85027 COMPLETE CBC AUTOMATED: CPT | Performed by: SPECIALIST

## 2024-04-10 PROCEDURE — 94761 N-INVAS EAR/PLS OXIMETRY MLT: CPT

## 2024-04-10 PROCEDURE — 97535 SELF CARE MNGMENT TRAINING: CPT

## 2024-04-10 PROCEDURE — 94799 UNLISTED PULMONARY SVC/PX: CPT | Mod: XB

## 2024-04-10 PROCEDURE — 99900031 HC PATIENT EDUCATION (STAT)

## 2024-04-10 PROCEDURE — 27000221 HC OXYGEN, UP TO 24 HOURS

## 2024-04-10 PROCEDURE — 25000003 PHARM REV CODE 250: Performed by: NURSE PRACTITIONER

## 2024-04-10 RX ORDER — METHOCARBAMOL 750 MG/1
750 TABLET, FILM COATED ORAL NIGHTLY PRN
Status: DISCONTINUED | OUTPATIENT
Start: 2024-04-10 | End: 2024-04-11

## 2024-04-10 RX ORDER — GABAPENTIN 300 MG/1
300 CAPSULE ORAL NIGHTLY
Status: DISCONTINUED | OUTPATIENT
Start: 2024-04-10 | End: 2024-04-11 | Stop reason: HOSPADM

## 2024-04-10 RX ADMIN — CARVEDILOL 12.5 MG: 6.25 TABLET, FILM COATED ORAL at 08:04

## 2024-04-10 RX ADMIN — HYDROCODONE BITARTRATE AND ACETAMINOPHEN 1 TABLET: 5; 325 TABLET ORAL at 06:04

## 2024-04-10 RX ADMIN — SENNOSIDES AND DOCUSATE SODIUM 2 TABLET: 8.6; 5 TABLET ORAL at 08:04

## 2024-04-10 RX ADMIN — POLYETHYLENE GLYCOL 3350 17 G: 17 POWDER, FOR SOLUTION ORAL at 08:04

## 2024-04-10 RX ADMIN — METOCLOPRAMIDE 10 MG: 5 INJECTION, SOLUTION INTRAMUSCULAR; INTRAVENOUS at 11:04

## 2024-04-10 RX ADMIN — METOCLOPRAMIDE 10 MG: 5 INJECTION, SOLUTION INTRAMUSCULAR; INTRAVENOUS at 05:04

## 2024-04-10 RX ADMIN — DOCUSATE SODIUM 200 MG: 100 CAPSULE, LIQUID FILLED ORAL at 05:04

## 2024-04-10 RX ADMIN — HYDROCODONE BITARTRATE AND ACETAMINOPHEN 1 TABLET: 7.5; 325 TABLET ORAL at 08:04

## 2024-04-10 RX ADMIN — GABAPENTIN 300 MG: 300 CAPSULE ORAL at 08:04

## 2024-04-10 RX ADMIN — METHOCARBAMOL 750 MG: 750 TABLET ORAL at 06:04

## 2024-04-10 RX ADMIN — FLUOXETINE 40 MG: 20 CAPSULE ORAL at 08:04

## 2024-04-10 RX ADMIN — FAMOTIDINE 20 MG: 20 TABLET ORAL at 11:04

## 2024-04-10 RX ADMIN — ASPIRIN 81 MG 81 MG: 81 TABLET ORAL at 08:04

## 2024-04-10 RX ADMIN — BUSPIRONE HYDROCHLORIDE 15 MG: 10 TABLET ORAL at 08:04

## 2024-04-10 RX ADMIN — KETOROLAC TROMETHAMINE 15 MG: 30 INJECTION, SOLUTION INTRAMUSCULAR at 05:04

## 2024-04-10 RX ADMIN — CEFAZOLIN 2 G: 2 INJECTION, POWDER, FOR SOLUTION INTRAMUSCULAR; INTRAVENOUS at 02:04

## 2024-04-10 RX ADMIN — HYDROCODONE BITARTRATE AND ACETAMINOPHEN 1 TABLET: 7.5; 325 TABLET ORAL at 02:04

## 2024-04-10 RX ADMIN — KETOROLAC TROMETHAMINE 15 MG: 30 INJECTION, SOLUTION INTRAMUSCULAR at 11:04

## 2024-04-10 NOTE — PT/OT/SLP PROGRESS
"Physical Therapy Treatment    Patient Name:  Cam Pang   MRN:  [id number]    Recommendations:     Discharge Recommendations: Low Intensity Therapy  Discharge Equipment Recommendations: walker, rolling  Barriers to discharge:  pain managment    Assessment:     Cam Pang is a 54 y.o. male admitted with a medical diagnosis of Primary osteoarthritis of right hip.  He presents with the following impairments/functional limitations: weakness, impaired endurance, impaired functional mobility, decreased lower extremity function, pain, decreased ROM, edema, orthopedic precautions .    Rehab Prognosis: Good; patient would benefit from acute skilled PT services to address these deficits and reach maximum level of function.    Recent Surgery: Procedure(s) (LRB):  ROBOTIC ARTHROPLASTY,HIP (Right) 1 Day Post-Op    Plan:     During this hospitalization, patient to be seen BID to address the identified rehab impairments via gait training, therapeutic activities, therapeutic exercises and progress toward the following goals:    Plan of Care Expires:  04/15/24    Subjective     Chief Complaint: pain  Patient/Family Comments/goals: n/a  Pain/Comfort:         Objective:     Communicated with rn prior to session.  Patient found supine with   upon PT entry to room.     General Precautions: Standard, fall  Orthopedic Precautions: RLE partial weight bearing, RLE anterior precautions  Braces:    Respiratory Status: Room air     Functional Mobility:  Bed Mobility:     Supine to Sit: moderate assistance  Transfers:     Sit to Stand:  minimum assistance with rolling walker  Bed to Chair: contact guard assistance with  rolling walker  using  Step Transfer  Gait: pt amb 150ft w/rw and cga for safety, pt amb with step to gait pattern. Pt required multiple standing rest breaks due to fatigue and pain.   Stairs:  Pt ascended/descended 3 stair(s) and 4" curb step with Rolling Walker with bilateral handrails with Contact " Guard Assistance.     Treatment & Education:  Pt required multiple breaks throughout treatment and increase encouragement.     Patient left up in chair with all lines intact and call button in reach..    GOALS:   Multidisciplinary Problems       Physical Therapy Goals          Problem: Physical Therapy    Goal Priority Disciplines Outcome Goal Variances Interventions   Physical Therapy Goal     PT, PT/OT Ongoing, Progressing     Description: Pt will improve functional independence by performing:    Bed mobility: SBA  Sit to stand: SBA with rolling walker  Bed to chair t/f: SBA with Stand Step  with rolling walker  Ambulation x 200'  with SBA with rolling walker  1 Step (Curb): Min A  with rolling walker-met  3 Steps: Min A  with B HR-met  Independent with total hip HEP                        Time Tracking:     PT Received On:    PT Start Time: 1100     PT Stop Time: 1123  PT Total Time (min): 23 min     Billable Minutes: Gait Training 10 and Therapeutic Activity 13    Treatment Type: Treatment  PT/PTA: PTA     Number of PTA visits since last PT visit: 1     04/10/2024

## 2024-04-10 NOTE — PROGRESS NOTES
"No acute events overnight.  Pain controlled.  Resting in bed.    Vital Signs  Temp: 98.5 °F (36.9 °C)  Temp Source: Oral  Pulse: 75  Heart Rate Source: Monitor  Resp: 16  SpO2: (!) 90 %  Pulse Oximetry Type: Continuous  Flow (L/min): 10  Device (Oxygen Therapy): room air  BP: 118/69  BP Location: Left arm  BP Method: Automatic  Patient Position: Lying  Height and Weight  Height: 6' 1" (185.4 cm)  Height Method: Stated  Weight: 125.3 kg (276 lb 3.8 oz)  Weight Method: Standard Scale  BSA (Calculated - sq m): 2.54 sq meters  BMI (Calculated): 36.5  Weight in (lb) to have BMI = 25: 189.1]    +FHL/EHL  Brisk capillary refill distally  Dressing c/d/i  Sensation intact to light touch distally    Recent Lab Results         04/10/24  0524   04/10/24  0522   04/09/24  1020   04/09/24  1020        Anion Gap 6.0             BUN 15.7             BUN/CREAT RATIO 16             Calcium 8.5             Chloride 109             CO2 27             Creatinine 1.00             eGFR >60             Glucose 144             Hematocrit 39.9       39.3       Hemoglobin 12.2       12.3       MCH 30.9             MCHC 30.6             .0             MPV 10.1             nRBC 0.0             Platelet Count 195             POCT Glucose   119   117         Potassium 4.8             RBC 3.95             RDW 12.7             Sodium 142             WBC 10.52                     A/P:  Status post right [id number]  Overall patient doing well.  Therapy for mobility and ambulation.  aspirin for DVT PPx    "

## 2024-04-10 NOTE — PLAN OF CARE
Problem: Physical Therapy  Goal: Physical Therapy Goal  Description: Pt will improve functional independence by performing:    Bed mobility: SBA  Sit to stand: SBA with rolling walker met  Bed to chair t/f: SBA with Stand Step  with rolling walker  Ambulation x 200'  with SBA with rolling walker   1 Step (Curb): Min A  with rolling walker-met  3 Steps: Min A  with B HR-met  Independent with total hip HEP   Outcome: Ongoing, Progressing

## 2024-04-10 NOTE — PT/OT/SLP PROGRESS
Physical Therapy Treatment    Patient Name:  Cam Pang   MRN:  [id number]    Recommendations:     Discharge Recommendations: Low Intensity Therapy  Discharge Equipment Recommendations: walker, rolling  Barriers to discharge: None    Assessment:     Cam Pang is a 54 y.o. male admitted with a medical diagnosis of Primary osteoarthritis of right hip.  He presents with the following impairments/functional limitations: weakness, impaired endurance, impaired functional mobility, decreased lower extremity function, pain, decreased ROM, edema, orthopedic precautions .    Rehab Prognosis: Good; patient would benefit from acute skilled PT services to address these deficits and reach maximum level of function.    Recent Surgery: Procedure(s) (LRB):  ROBOTIC ARTHROPLASTY,HIP (Right) 1 Day Post-Op    Plan:     During this hospitalization, patient to be seen BID to address the identified rehab impairments via gait training, therapeutic activities, therapeutic exercises and progress toward the following goals:    Plan of Care Expires:  04/15/24    Subjective     Chief Complaint: R hip pain  Patient/Family Comments/goals:   Pain/Comfort:  Location - Side 1: Right  Location 1: hip  Pain Addressed 1: Pre-medicate for activity, Reposition, Distraction, Cessation of Activity      Objective:     Communicated with nurse  prior to session.  Patient found supine with peripheral IV, telemetry upon PT entry to room.     General Precautions: Standard, fall  Orthopedic Precautions: RLE partial weight bearing, RLE anterior precautions  Braces:    Respiratory Status: Room air     Functional Mobility:  Bed Mobility:     Supine to Sit: minimum assistance  Transfers:     Sit to Stand:  contact guard assistance with rolling walker  Gait: Pt ambulated 200 ft w rw and CGA, using step through gait pattern at slow pace with difficulty keeping eyes open        Treatment & Education:  Pt edu on total hip precautions and WB  status  Pt completed TAMIKO therex x10 AAROM    Patient left up in chair with all lines intact, call button in reach, nurse notified, and family present..    GOALS:   Multidisciplinary Problems       Physical Therapy Goals          Problem: Physical Therapy    Goal Priority Disciplines Outcome Goal Variances Interventions   Physical Therapy Goal     PT, PT/OT Ongoing, Progressing     Description: Pt will improve functional independence by performing:    Bed mobility: SBA  Sit to stand: SBA with rolling walker met  Bed to chair t/f: SBA with Stand Step  with rolling walker  Ambulation x 200'  with SBA with rolling walker   1 Step (Curb): Min A  with rolling walker-met  3 Steps: Min A  with B HR-met  Independent with total hip HEP                        Time Tracking:     PT Received On:    PT Start Time: 1612     PT Stop Time: 1627  PT Total Time (min): 15 min     Billable Minutes: Gait Training 15    Treatment Type: Treatment  PT/PTA: PT     Number of PTA visits since last PT visit: 0     04/10/2024

## 2024-04-10 NOTE — PT/OT/SLP EVAL
Occupational Therapy   Evaluation    Name: Cam Pang  MRN: [id number]  Admitting Diagnosis: Primary osteoarthritis of right hip  Recent Surgery: Procedure(s) (LRB):  ROBOTIC ARTHROPLASTY,HIP (Right) 1 Day Post-Op    Recommendations:     Discharge Recommendations: Low Intensity Therapy  Discharge Equipment Recommendations:  walker, rolling, bedside commode  Barriers to discharge:  None    Assessment:     Cam Pang is a 54 y.o. male with a medical diagnosis of Primary osteoarthritis of right hip. Performance deficits affecting function: weakness, impaired endurance, impaired self care skills, impaired functional mobility, gait instability, impaired balance, decreased lower extremity function, pain, orthopedic precautions, impaired muscle length, impaired joint extensibility.      Rehab Prognosis: Good; patient would benefit from acute skilled OT services to address these deficits and reach maximum level of function.       Plan:     Patient to be seen daily (BID) to address the above listed problems via self-care/home management, therapeutic activities, therapeutic exercises  Plan of Care Expires: 04/16/24  Plan of Care Reviewed with: patient, friend    Subjective     Chief Complaint: pain in R hip, dizziness  Patient/Family Comments/goals: to go home    Occupational Profile:  Living Environment: lives alone in [facility] , no DOUG, has WIS with [employer]  Previous level of function: independent with use of cane for FM  Roles and Routines: disabled  Equipment Used at Home: cane, straight  Assistance upon Discharge: mother    Pain/Comfort:  Pain Rating 1: 9/10  Location - Side 1: Right  Location - Orientation 1: generalized  Location 1: hip  Pain Addressed 1: Distraction, Reposition, Pre-medicate for activity  Pain Rating Post-Intervention 1: 8/10    Patients cultural, spiritual, Oriental orthodox conflicts given the current situation: no    Objective:     Communicated with: NSVAELNTINA prior to session.  Patient  found up in chair with peripheral IV upon OT entry to room.    General Precautions: Standard, fall  Orthopedic Precautions: RLE anterior precautions, RLE partial weight bearing (75%)  Braces: N/A  Respiratory Status: Room air    Occupational Performance:    Functional Mobility/Transfers:  Patient completed Sit <> Stand Transfer with contact guard assistance  with  rolling walker   Functional Mobility: Patient performed FM in hallway with RW and CGA, which progressed to SBA. Patient took several standing rest breaks and one seated rest break after ambulating ~100 feet. He was able to then ambulate another ~100 feet back to his room    Activities of Daily Living:  Upper Body Dressing: independence    Lower Body Dressing: minimum assistance pt educated on use of reacher for threading legs into pant legs, his mother present during eval, insisted on helping him with pants.    Cognitive/Visual Perceptual:  Cognitive/Psychosocial Skills:     -       Oriented to: Person, Place, Time, and Situation   -       Follows Commands/attention:Follows multistep  commands  -       Communication: clear/fluent  -       Memory: No Deficits noted  -       Safety awareness/insight to disability: intact   -       Mood/Affect/Coping skills/emotional control: Appropriate to situation and Cooperative    Physical Exam:  Upper Extremity Range of Motion:  -       Right Upper Extremity: WFL  -       Left Upper Extremity: WFL  Upper Extremity Strength:    -       Right Upper Extremity: WFL  -       Left Upper Extremity: WFL    Treatment & Education:  Reviewed anterior precautions with patient, discussed how this affects his gait pattern with assistive device. Pt receptive.     Vital signs taken during eval due to patient complaints of feeling light-headed and experiencing tinnitus: /64, HR 74, oxygen sat 90%. RN notified.    Patient left up in chair with all lines intact, call button in reach, RN notified, and friend present    GOALS:    Multidisciplinary Problems       Occupational Therapy Goals          Problem: Occupational Therapy    Goal Priority Disciplines Outcome Interventions   Occupational Therapy Goal     OT, PT/OT Ongoing, Progressing    Description: Pt will perform LB dressing CGA with AE PRN by d/c.  Pt will perform toileting CGA by d/c.  Pt will perform toilet t/f CGA by d/c.  Pt will perform shower t/f CGA by d/c.  Pt will perform car t/f SBA in adherence to pxns by d/c.                        History:     Past Medical History:   Diagnosis Date    Coronary artery disease     Depression     Diabetes mellitus, type 2     Hypertension     Primary osteoarthritis of right hip 04/09/2024    Schizophrenia, unspecified     Sleep apnea, unspecified     cpap         Past Surgical History:   Procedure Laterality Date    APPENDECTOMY      arm fracture surgery Left     cardiac stents      2x    ORIF FACIAL FRACTURE Left     ROBOTIC ARTHROPLASTY, HIP Right 4/9/2024    Procedure: ROBOTIC ARTHROPLASTY,HIP;  Surgeon: Ke Joe MD;  Location: [facility];  Service: Orthopedics;  Laterality: Right;  Rk    SINUS SURGERY         Time Tracking:     OT Date of Treatment: 04/10/24  OT Start Time: 0835  OT Stop Time: 0907  OT Total Time (min): 32 min    Billable Minutes:Evaluation 32    4/10/2024

## 2024-04-10 NOTE — PT/OT/SLP PROGRESS
"Occupational Therapy   Treatment    Name: Cam Pang  MRN: [id number]  Admitting Diagnosis:  Primary osteoarthritis of right hip  1 Day Post-Op    Recommendations:     Discharge Recommendations: Low Intensity Therapy  Discharge Equipment Recommendations:  walker, rolling, bedside commode  Barriers to discharge:  None    Assessment:     Cam Pang is a 54 y.o. male with a medical diagnosis of Primary osteoarthritis of right hip. Performance deficits affecting function are weakness, impaired endurance, impaired functional mobility, gait instability, impaired self care skills, impaired balance, decreased safety awareness, decreased lower extremity function, pain, decreased ROM, orthopedic precautions, impaired muscle length, impaired joint extensibility.     Rehab Prognosis:  Good; patient would benefit from acute skilled OT services to address these deficits and reach maximum level of function.       Plan:     Patient to be seen daily (BID) to address the above listed problems via self-care/home management, therapeutic activities, therapeutic exercises  Plan of Care Expires: 04/16/24  Plan of Care Reviewed with: patient, mother    Subjective     Chief Complaint: feeling lightheaded, pain  Patient/Family Comments/goals: "I don't know why I feel like this."  Pain/Comfort:  Pain Rating 1: 7/10  Location - Side 1: Right  Location - Orientation 1: generalized  Location 1: hip  Pain Addressed 1: Distraction, Reposition  Pain Rating Post-Intervention 1: 8/10    Objective:     Communicated with: NSG prior to session.  Patient found HOB elevated with peripheral IV, oxygen upon OT entry to room.    General Precautions: Standard, fall    Orthopedic Precautions:RLE anterior precautions, RLE partial weight bearing (75%)  Braces: N/A  Respiratory Status: Nasal cannula, flow 1 L/min     Occupational Performance:     Bed Mobility:    Patient completed Scooting/Bridging with moderate assistance  Patient " completed Supine to Sit with stand by assistance     Functional Mobility/Transfers:  Patient completed Sit <> Stand Transfer with contact guard assistance  with  rolling walker   Patient completed Bed <> Chair Transfer using Step Transfer technique with contact guard assistance with rolling walker  Patient completed Toilet Transfer Step Transfer technique with stand by assistance with  rolling walker and bedside commode  Functional Mobility: Patient was only able to tolerate in-room FM for toileting.    Activities of Daily Living:  Lower Body Dressing: contact guard assistance using reacher and sock aid while sitting EOB. Pt doffed and donned his socks.  Toileting: supervision for clothing management and wiping.  Grooming: pt stood at sink with RW and CGA to wash hands following toileting, min verbal cues to refrain from discarding walker    Treatment & Education:  Reviewed precautions with patient - no twisting and crossing. Emphasized importance of keeping rolling walker in front of him during transfers, FM, and when at countertop.    Patient left up in chair with all lines intact, call button in reach, and mom present    GOALS:   Multidisciplinary Problems       Occupational Therapy Goals          Problem: Occupational Therapy    Goal Priority Disciplines Outcome Interventions   Occupational Therapy Goal     OT, PT/OT Ongoing, Progressing    Description: Pt will perform LB dressing CGA with AE PRN by d/c.  Pt will perform toileting CGA by d/c.  Pt will perform toilet t/f CGA by d/c.  Pt will perform shower t/f CGA by d/c.  Pt will perform car t/f SBA in adherence to pxns by d/c.                        Time Tracking:     OT Date of Treatment: 04/10/24  OT Start Time: 1347  OT Stop Time: 1410  OT Total Time (min): 23 min    Billable Minutes:Self Care/Home Management 23    OT/CARA: OT          4/10/2024

## 2024-04-10 NOTE — PLAN OF CARE
Problem: Occupational Therapy  Goal: Occupational Therapy Goal  Description: Pt will perform LB dressing CGA with AE PRN by d/c.  Pt will perform toileting CGA by d/c.  Pt will perform toilet t/f CGA by d/c.  Pt will perform shower t/f CGA by d/c.  Pt will perform car t/f SBA in adherence to pxns by d/c.   Outcome: Ongoing, Progressing

## 2024-04-10 NOTE — PLAN OF CARE
Problem: Adult Inpatient Plan of Care  Goal: Plan of Care Review  Outcome: Ongoing, Progressing  Flowsheets (Taken 4/9/2024 2210)  Plan of Care Reviewed With: patient     Problem: Diabetes Comorbidity  Goal: Blood Glucose Level Within Targeted Range  Outcome: Ongoing, Progressing  Intervention: Monitor and Manage Glycemia  Flowsheets (Taken 4/9/2024 2210)  Glycemic Management: blood glucose monitored     Problem: Pain Acute  Goal: Acceptable Pain Control and Functional Ability  Outcome: Ongoing, Progressing  Intervention: Develop Pain Management Plan  Flowsheets (Taken 4/9/2024 2210)  Pain Management Interventions:   around-the-clock dosing utilized   care clustered   medication offered   position adjusted   relaxation techniques promoted   quiet environment facilitated     Problem: Bleeding (Hip Arthroplasty)  Goal: Absence of Bleeding  Outcome: Ongoing, Progressing  Intervention: Monitor and Manage Bleeding  Flowsheets (Taken 4/9/2024 2210)  Bleeding Management: dressing monitored     Problem: Pain (Hip Arthroplasty)  Goal: Acceptable Pain Control  Outcome: Ongoing, Progressing  Intervention: Prevent or Manage Pain  Flowsheets (Taken 4/9/2024 2210)  Pain Management Interventions:   around-the-clock dosing utilized   care clustered   medication offered   position adjusted   relaxation techniques promoted   quiet environment facilitated

## 2024-04-10 NOTE — PLAN OF CARE
Problem: Physical Therapy  Goal: Physical Therapy Goal  Description: Pt will improve functional independence by performing:    Bed mobility: SBA  Sit to stand: SBA with rolling walker  Bed to chair t/f: SBA with Stand Step  with rolling walker  Ambulation x 200'  with SBA with rolling walker  1 Step (Curb): Min A  with rolling walker-met  3 Steps: Min A  with B HR-met  Independent with total hip HEP   Outcome: Ongoing, Progressing

## 2024-04-10 NOTE — PLAN OF CARE
04/10/24 1242   Discharge Assessment   Assessment Type Discharge Planning Assessment   Source of Information patient;family   Communicated RADHA with patient/caregiver Yes   Reason For Admission s/p THR   People in Home alone   Do you expect to return to your current living situation? Yes   Do you have help at home or someone to help you manage your care at home? Yes   Who are your caregiver(s) and their phone number(s)? S/O - Radha [phone]   Prior to hospitilization cognitive status: Alert/Oriented   Current cognitive status: Alert/Oriented   Walking or Climbing Stairs Difficulty yes   Walking or Climbing Stairs ambulation difficulty, requires equipment   Dressing/Bathing Difficulty yes   Dressing/Bathing bathing difficulty, requires equipment;bathing difficulty, assistance 1 person;dressing difficulty, requires equipment;dressing difficulty, assistance 1 person   Equipment Currently Used at Home none   Readmission within 30 days? No   Patient currently being followed by outpatient case management? No   Do you currently have service(s) that help you manage your care at home? No   Do you take prescription medications? Yes   Do you have prescription coverage? Yes   Do you have any problems affording any of your prescribed medications? No   Is the patient taking medications as prescribed? yes   Who is going to help you get home at discharge? s/o   How do you get to doctors appointments? car, drives self   Are you on dialysis? No   Do you take coumadin? No   Discharge Plan A Home with family;Home Health   Discharge Plan B Home with family;Home Health   DME Needed Upon Discharge  walker, rolling;bedside commode;hip kit   Discharge Plan discussed with: Spouse/sig other;Patient   Transition of Care Barriers Mobility     S/p THR. Spk w patient & significant other, Radha @ bedside-- pt lives alone, but significant other will move in & asst w homecare. Pt needs RW and BSC. Pt/signif other requesting [facility], then  transition to outpt therapy. Provider list given. Foc obtained.   Called/faxed referral for hh to [facility]. Plan discharge later today.   Called/faxed referral for outpt therapy to [facility] -- to start in 2 wks. They will contact pt with appointment date & time.  Called/faxed referral for dme to [facility].  They will deliver to hospital.   Pcp: Leonel POPE  CONTACT # [phone]      Patient DID NOT participate in a pre-op exercise regimen

## 2024-04-11 VITALS
HEIGHT: 73 IN | SYSTOLIC BLOOD PRESSURE: 150 MMHG | DIASTOLIC BLOOD PRESSURE: 75 MMHG | WEIGHT: 276.25 LBS | HEART RATE: 76 BPM | BODY MASS INDEX: 36.61 KG/M2 | OXYGEN SATURATION: 94 % | TEMPERATURE: 98 F | RESPIRATION RATE: 18 BRPM

## 2024-04-11 LAB
ANION GAP SERPL CALC-SCNC: 6 MEQ/L
BUN SERPL-MCNC: 13.2 MG/DL (ref 8.4–25.7)
CALCIUM SERPL-MCNC: 8.3 MG/DL (ref 8.4–10.2)
CHLORIDE SERPL-SCNC: 110 MMOL/L (ref 98–107)
CO2 SERPL-SCNC: 27 MMOL/L (ref 22–29)
CREAT SERPL-MCNC: 0.82 MG/DL (ref 0.73–1.18)
CREAT/UREA NIT SERPL: 16
ERYTHROCYTE [DISTWIDTH] IN BLOOD BY AUTOMATED COUNT: 12.9 % (ref 11.5–17)
GFR SERPLBLD CREATININE-BSD FMLA CKD-EPI: >60 MLS/MIN/1.73/M2
GLUCOSE SERPL-MCNC: 123 MG/DL (ref 74–100)
HCT VFR BLD AUTO: 35.5 % (ref 42–52)
HGB BLD-MCNC: 11 G/DL (ref 14–18)
MCH RBC QN AUTO: 31.1 PG (ref 27–31)
MCHC RBC AUTO-ENTMCNC: 31 G/DL (ref 33–36)
MCV RBC AUTO: 100.3 FL (ref 80–94)
NRBC BLD AUTO-RTO: 0 %
PLATELET # BLD AUTO: 175 X10(3)/MCL (ref 130–400)
PMV BLD AUTO: 10.2 FL (ref 7.4–10.4)
POTASSIUM SERPL-SCNC: 4.1 MMOL/L (ref 3.5–5.1)
RBC # BLD AUTO: 3.54 X10(6)/MCL (ref 4.7–6.1)
SODIUM SERPL-SCNC: 143 MMOL/L (ref 136–145)
WBC # SPEC AUTO: 11.6 X10(3)/MCL (ref 4.5–11.5)

## 2024-04-11 PROCEDURE — 94799 UNLISTED PULMONARY SVC/PX: CPT | Mod: XB

## 2024-04-11 PROCEDURE — 97116 GAIT TRAINING THERAPY: CPT

## 2024-04-11 PROCEDURE — 97530 THERAPEUTIC ACTIVITIES: CPT

## 2024-04-11 PROCEDURE — 99900031 HC PATIENT EDUCATION (STAT)

## 2024-04-11 PROCEDURE — 80048 BASIC METABOLIC PNL TOTAL CA: CPT | Performed by: SPECIALIST

## 2024-04-11 PROCEDURE — 25000003 PHARM REV CODE 250: Performed by: NURSE PRACTITIONER

## 2024-04-11 PROCEDURE — 85027 COMPLETE CBC AUTOMATED: CPT | Performed by: SPECIALIST

## 2024-04-11 PROCEDURE — 63600175 PHARM REV CODE 636 W HCPCS: Performed by: NURSE PRACTITIONER

## 2024-04-11 PROCEDURE — 94761 N-INVAS EAR/PLS OXIMETRY MLT: CPT

## 2024-04-11 PROCEDURE — 25000003 PHARM REV CODE 250: Performed by: SPECIALIST

## 2024-04-11 RX ORDER — HYDROCODONE BITARTRATE AND ACETAMINOPHEN 10; 325 MG/1; MG/1
1 TABLET ORAL EVERY 4 HOURS PRN
Status: DISCONTINUED | OUTPATIENT
Start: 2024-04-11 | End: 2024-04-11 | Stop reason: HOSPADM

## 2024-04-11 RX ORDER — KETOROLAC TROMETHAMINE 10 MG/1
10 TABLET, FILM COATED ORAL EVERY 8 HOURS
Qty: 15 TABLET | Refills: 0 | Status: SHIPPED | OUTPATIENT
Start: 2024-04-11 | End: 2024-04-16

## 2024-04-11 RX ORDER — ASPIRIN 81 MG/1
81 TABLET ORAL EVERY MORNING
Refills: 0
Start: 2024-04-11

## 2024-04-11 RX ORDER — HYDROCODONE BITARTRATE AND ACETAMINOPHEN 7.5; 325 MG/1; MG/1
1 TABLET ORAL EVERY 4 HOURS PRN
Status: DISCONTINUED | OUTPATIENT
Start: 2024-04-11 | End: 2024-04-11 | Stop reason: HOSPADM

## 2024-04-11 RX ORDER — METHOCARBAMOL 750 MG/1
750 TABLET, FILM COATED ORAL EVERY 6 HOURS PRN
Qty: 56 TABLET | Refills: 0 | Status: SHIPPED | OUTPATIENT
Start: 2024-04-11 | End: 2024-04-11 | Stop reason: HOSPADM

## 2024-04-11 RX ORDER — POLYETHYLENE GLYCOL 3350 17 G/17G
17 POWDER, FOR SOLUTION ORAL DAILY
Qty: 14 EACH | Refills: 0 | Status: SHIPPED | OUTPATIENT
Start: 2024-04-11 | End: 2024-04-25

## 2024-04-11 RX ORDER — HYDROCODONE BITARTRATE AND ACETAMINOPHEN 7.5; 325 MG/1; MG/1
1 TABLET ORAL EVERY 4 HOURS PRN
Qty: 42 TABLET | Refills: 0 | Status: SHIPPED | OUTPATIENT
Start: 2024-04-11 | End: 2024-04-18

## 2024-04-11 RX ORDER — CEFADROXIL 500 MG/1
500 CAPSULE ORAL EVERY 12 HOURS
Qty: 14 CAPSULE | Refills: 0 | Status: SHIPPED | OUTPATIENT
Start: 2024-04-11 | End: 2024-04-18

## 2024-04-11 RX ORDER — KETOROLAC TROMETHAMINE 30 MG/ML
30 INJECTION, SOLUTION INTRAMUSCULAR; INTRAVENOUS ONCE
Status: COMPLETED | OUTPATIENT
Start: 2024-04-11 | End: 2024-04-11

## 2024-04-11 RX ADMIN — HYDROCODONE BITARTRATE AND ACETAMINOPHEN 1 TABLET: 7.5; 325 TABLET ORAL at 05:04

## 2024-04-11 RX ADMIN — FLUOXETINE 40 MG: 20 CAPSULE ORAL at 09:04

## 2024-04-11 RX ADMIN — SENNOSIDES AND DOCUSATE SODIUM 2 TABLET: 8.6; 5 TABLET ORAL at 09:04

## 2024-04-11 RX ADMIN — HYDROCODONE BITARTRATE AND ACETAMINOPHEN 1 TABLET: 10; 325 TABLET ORAL at 09:04

## 2024-04-11 RX ADMIN — KETOROLAC TROMETHAMINE 30 MG: 30 INJECTION, SOLUTION INTRAMUSCULAR at 09:04

## 2024-04-11 RX ADMIN — HYDROCODONE BITARTRATE AND ACETAMINOPHEN 1 TABLET: 10; 325 TABLET ORAL at 01:04

## 2024-04-11 RX ADMIN — FLUOXETINE 20 MG: 20 CAPSULE ORAL at 01:04

## 2024-04-11 RX ADMIN — LISINOPRIL 40 MG: 10 TABLET ORAL at 09:04

## 2024-04-11 RX ADMIN — HYDROCODONE BITARTRATE AND ACETAMINOPHEN 1 TABLET: 7.5; 325 TABLET ORAL at 01:04

## 2024-04-11 RX ADMIN — ASPIRIN 81 MG 81 MG: 81 TABLET ORAL at 09:04

## 2024-04-11 RX ADMIN — CARVEDILOL 12.5 MG: 6.25 TABLET, FILM COATED ORAL at 09:04

## 2024-04-11 RX ADMIN — BUSPIRONE HYDROCHLORIDE 15 MG: 10 TABLET ORAL at 09:04

## 2024-04-11 RX ADMIN — DOCUSATE SODIUM 200 MG: 100 CAPSULE, LIQUID FILLED ORAL at 09:04

## 2024-04-11 NOTE — DISCHARGE INSTRUCTIONS
[facility]  [address]  [place] [address]  Phone [phone]       /      Fax [phone]  SURGEON: Dr. Joe    After discharge, all questions or concerns should be handled at your surgeon's office ([phone]). If it is a weekend or after hours, you will get the surgeon on call.     Discharge Medications:    PAIN MANAGEMENT: Next Dose Available   Toradol/Ketorolac 10mg (Anti-inflammatory) - take every 8 hours, around the clock, for the next 5 days Start today   Norco 7.5/325mg (Hydrocodone/Acetaminophen) (Pain Med) - every 4-6 hours AS NEEDED for pain 5:30pm   Neurontin/Gabapentin 300mg (Nerve/Shocking Pain) - Ok to restart home regimen. DECREASE DOSE AND FREQUENCY WHILE ON NARCOTICS.  Restart home dosing   COMPLICATION PREVENTION MEDS: Next Dose DUE   Aspirin 81mg twice a day for 6 weeks post-op for blood clot prevention PM on 4/11/2024   MiraLAX 17gm - once or twice a day while on narcotics and muscle relaxers for constipation prevention PM on 4/11/2024   Duricef/Cefadroxil 500mg (Antibiotic) - twice a day for 7 days post-op for infection prevention PM on 4/11/2024   Take a medication once or twice a day while taking TORADOL and Aspirin at the same time to prevent heartburn PM on 4/11/2024           Total Hip Replacement                                                                                                                                  PAIN MEDICATIONS/PAIN MANAGEMENT: (Use the medication log in your discharge packet to keep track of your medications)  Toradol/Ketorolac 10mg (anti-inflammatory) - take every 8 hours around the clock for the next 5 days.   While on Toradol/Ketorolac and Aspirin (blood thinner) at the same time, take a medication once or twice a day to protect your stomach (for the next 5 days) (Examples: Nexium, Prilosec, Prevacid, Omeprazole, Pepcid...etc). If you start having intolerable stomach issues, discontinue the Toradol  completely.   Aside from Toradol, No other anti-inflammatories (Ibuprofen, Aleve, Motrin, Naproxen, Mobic/Meloxicam, Celebrex, Diclofenac/Voltaren...etc) for 2 weeks or until the wound is healed.      Norco 7.5/325mg (Hydrocodone/Acetaminophen) (pain pill) - You can take 1 tablet every 4-6 hours for pain. If the pain is mild, take 1/2 of a pill. Once you start taking a half of a pain pill, you can take a Tylenol 325mg with each dose for a little extra pain relief without side effects. Gradually decrease the use as the pain lessens. As you decrease the Norco, increase the Tylenol.      **NO MORE THAN 3000mg OF TYLENOL IN 24 HOURS**.     Neurontin/Gabapentin 600mg (neuropathic/shocking/ nerve like pain) - ok to restart home regimen.  SWITCH TO BEDTIME DOSING or decrease dose and frequency while on pain pills and muscle relaxers.     **Other things that help with pain control is WALKING, COMPRESSION WRAP, ICE and ELEVATION!!**    BLOOD CLOT PREVENTION:   Aspirin 81mg (blood thinner) - twice a day for 6 weeks for blood clot prevention. Start on the evening of 4/11/24. Stop on 5/22/24.  If you were taking Baby Aspirin 81mg prior to surgery, may return to daily dose AFTER 6 weeks - 5/23/24.    You need to continuing wearing your compression stocking (ELGIN Hose - ThromboEmbolic Disease Prevention Device) for the next 2-6 weeks post-op. It is ok to remove them for hygiene and at bedtime.   Hand wash and Dry. **If the swelling persists in the legs after you stop wearing the Elgin hose, continue to wear them until the swelling decreases.**  REMOVE STOCKINGS AT LEAST DAILY FOR SKIN ASSESSMENT.   Do NOT let the stockings roll down, creating a tourniquet around the back of your knee. If you need to, leave the excess at the bottom of the stocking.   The best thing you can do to prevent blood clots is to walk around as much as possible, AT LEAST EVERY 1-2 HOURS.       CONSTIPATION PREVENTION:   Miralax or Senokot S/Thea-Colace and  Stool softeners EVERY DAY while on pain meds.  Use other more aggressive over the counter LAXATIVES as needed for constipation (Examples: Milk of Magnesia, Dulcolax tabs or suppository, Magnesium Citrate, Fleet's Enema...etc.)   Drink lots of water.  Increase Fiber in diet.  Increase walking distance each day  DO NOT GO MORE THAN 2 DAYS WITHOUT HAVING A BOWEL MOVEMENT!    ACTIVITY:   Weight bearing precautions as follows:  75% PARTIAL weight bearing to operative leg with walker.   HIP PRECAUTIONS:  NO Twisting  NO Leaning past 90 degrees  NO Crossing legs    DO NOT TAKE YOURSELF OFF OF THE WALKER TOO SOON. ALLOW YOUR OUTPATIENT THERAPIST or SURGEON TO GUIDE YOU.   Change positions often throughout the day.   Walk around at least every 1-2 hours while awake.   No heavy lifting, pulling, pushing or straining.  Ice the Hip and thigh AS MUCH AS POSSIBLE  Home Health Physical Therapy at least 3 times per week. After 2 weeks, transition to outpatient physical therapy.        WOUND CARE:   Operative Hip poke hole - Occlusive Coverlet dressing (Small White bandage)  - change every other day and as needed for soiling. Start on SATURDAY. NO ointments, creams, lotions or antiseptics on the incision.      (Tan) Aquacel Dressing on Operative Hip - 7-Day dressing  Follow instructions on flyer for REMOVAL AND DRESSING CHANGE NECESSITY.     DO NOT REMOVE UNTIL DRESSING CHANGE DATE UNLESS SOILED OR THE DRESSING IS COMPROMISED.     Removal: (On 4/16/24)   Step 1: Remove the dressing - press down gently on the pad and carefully lift the edge of the dressing with the other hand   Step 2: Stretch the dressing parallel to break the adhesive seal   Step 3: Stretch all the way around the dressing   Step 4: Notify the surgeon if unable to remove the dressing and the adhesive around the wound.      NO ointments, creams, lotions or antiseptics on the incision. Once removed on the change date, apply occlusive coverlet dressing (long white  bandage) and change every other day and as needed for soiling for the next 7 days.     DO NOT PULL ON THE STERI-STRIPS. Allow them to fall off. Ok to wet the wound in 2 weeks post-op - 4/25/24.  DO NOT WET WOUND or apply any ointments, creams, lotions or antiseptics.  Ace wrap - apply your compression stocking and apply the ace wrap where the stocking stops for extra added compression to the knee.   May wet incision AFTER 2 weeks- (after you follow-up with your surgeon).   Ok to shower before then if able to keep wound from getting wet (plastic barrier, saran wrap or cling wrap and tape).   DO NOT TOUCH INCISION      URINARY RETENTION:  If you start having difficulty urinating, decrease the use of Pain pills and muscle relaxers and notify your primary care doctor.     PNEUMONIA PREVENTION:  Stay out of bed as much as possible and walk around every 1-2 hours.  Continue breathing exercises (Incentive Spirometry) every 1-2 hours while mobility is limited and while you are on pain pills.    FALL PREVENTION:  Wear sturdy shoes that fit well - Wearing shoes with high heels or slippery soles, or shoes that are too loose, can lead to falls. Walking around in bare feet, or only socks, can also increase your risk of falling.  Use walker as long as your surgeon and therapist recommend it  Use good lighting and  throw rugs, electrical cords, furniture and clutter (anything than can cause you to trip at home.   Non-slip rug in bathroom or shower    INFECTION PREVENTION:  Duricef/Cefadroxil 500mg (Antibiotic) -  take twice a day for 7 days to prevent infection  Proper handwashing before and after dressing changes. Do not wet the wound. Wound care instructions as written above. NOTIFY MD OF EXCESSIVE WOUND DRAINAGE.  No alcohol, smoking or tobacco products  Pets should not be allowed around the wound or the dressing.   Treat UTI and skin infections as soon as possible.  Pre-medicate with antibiotics prior to dental or  surgical procedures.   If you are diabetic, MAINTAIN GOOD BLOOD SUGAR CONTROL (Below 150) DURING YOUR RECOVERY. If you see high numbers, notify your primary care doctor.     Call your SURGEON'S OFFICE ([phone]) if you experience the following signs and symptoms of infection:   Unusual redness, swelling, excessive, cloudy or foul smelling drainage at the incision site.   Persistent low grade temp OR a temp greater than 102 F, unrelieved by Tylenol  Pain at surgical site, unrelieved by pain meds    Warning signs of a blood clot in your leg: (CALL YOUR SURGEON)  New onset or increasing pain in calf, new onset tenderness or redness above or below the knee or increasing swelling of your calf, ankle, or foot.  Warning signs that a blood clot has traveled to your lungs: (REPORT TO THE ER/CALL [phone])  Sudden or increase in Shortness of breath, sudden onset of chest pains, or  Localized chest pain with coughing.       IF ANY ISSUES ARISE AND YOU FEEL THE NEED TO CALL YOUR PRIMARY CARE DOCTOR, PLEASE LET YOUR SURGEON KNOW AS WELL.     For emergencies, please report to OUR ([facility] main campus) Emergency department and tell them to call YOUR SURGEON at [phone].     BEFORE MAKING ANY CHANGES TO THE MEDICAL CARE PLAN OR GOING TO THE EMERGENCY ROOM, PLEASE CONTACT THE SURGEON.    3rd floor nursing unit # [phone]  Use this number for questions about your discharge instructions or problems filling your discharge prescriptions.

## 2024-04-11 NOTE — PLAN OF CARE
Problem: Adult Inpatient Plan of Care  Goal: Plan of Care Review  Outcome: Ongoing, Progressing  Flowsheets (Taken 4/10/2024 2215)  Plan of Care Reviewed With: patient     Problem: Infection  Goal: Absence of Infection Signs and Symptoms  Outcome: Ongoing, Progressing     Problem: Pain Acute  Goal: Acceptable Pain Control and Functional Ability  Outcome: Ongoing, Progressing  Intervention: Develop Pain Management Plan  Flowsheets (Taken 4/10/2024 2215)  Pain Management Interventions:   around-the-clock dosing utilized   pain management plan reviewed with patient/caregiver   position adjusted   pillow support provided   relaxation techniques promoted   quiet environment facilitated     Problem: Bleeding (Hip Arthroplasty)  Goal: Absence of Bleeding  Outcome: Ongoing, Progressing  Intervention: Monitor and Manage Bleeding  Flowsheets (Taken 4/10/2024 2215)  Bleeding Management: dressing monitored

## 2024-04-11 NOTE — PROGRESS NOTES
No acute events overnight. Only complaint is pain, Patient much more awake and alert today, tolerating Norco well.     A/P:  Status post right Total Hip  Overall patient doing well.  Therapy for mobility and ambulation.    +FHL/EHL  BCR distally  Dressing c/d/i  SILT distally    Co-morbidities Mgt:  CAD - home meds resumed    Diabetes Mellitus Type 2 - home meds restarted, CBGs AC&HS with insulin sliding scale, tight glycemic control    Hypertension - Home meds restarted, BP stable at at baseline    Anxiety/Depression - home meds resumed    Sleep Apnea - CPAP in use at bedtime, Oxygen PRN, Monitor oxygen saturation    Obesity    Schizophrenia - home meds resumed      Assessment and Plan:   Oversedation, resolved    Impaired Mobility - PT for gait training and mobility, OT for ADLs - progressing well    Uncontrolled pain despite current pain medication regimen- resolved. the patient's pain is at a tolerable level is tolerating pain medication appropriately.    DVT Prophylaxis: ASA 81mg BID, MOIRA Hose, SCDs, Early ambulation  GI Prophylaxis: Pepcid BID  Bowel Regimen: Reglan/Miralax/Senokot S/Colace  Pain Controlled  (+ Voiding) (+ Flatus) (+BM x 2)  Ready for discharge    Discussed discharge instructions in length with patient and family at bedside. Verbalized Understanding. Discharge instructions included:   Safety - fall prevention, caution in rooms with a water source, use of walker until otherwise instructed by physical therapy or the physician  Infection prevention - proper hand washing, technique for dressing changes, abx bf dental/small surgical procedures, no smoking, proper blood sugar control and addressing UTI and skin infections ASAP.   DVT prophylaxis - Blood thinner, MOIRA hose for 2-6 weeks or until otherwise instructed by the doctor and frequent ambulation   Constipation Prevention- Stool softeners while on pain meds, increase fiber in diet and drink lots of water and the use of laxatives if bowel  movements do not return to normal within 1 day, two days in the latest.  Pneumonia Prevention: Out of bed as much as possible, continue IS q1-2 hours, frequent ambulation  Provided written discharge instructions to the patient with the above information.    Instructed to call surgeon with any needs or concerns, 911 and/or ER for emergencies      Deyanira Burgos FNP-C  Orthopedic Surgery  [facility]     This note was created with the assistance of voice recognition software or phone dictation.  There may be transcription errors as a result of using this technology however minimal. Effort has been made to assure accuracy of transcription but any obvious errors or omissions should be clarified with the author of the document.

## 2024-04-11 NOTE — PT/OT/SLP PROGRESS
Physical Therapy Treatment    Patient Name:  Cam Pang   MRN:  [id number]    Recommendations:     Discharge Recommendations: Low Intensity Therapy  Discharge Equipment Recommendations: walker, rolling  Barriers to discharge: None    Assessment:     Cam Pang is a 54 y.o. male admitted with a medical diagnosis of Primary osteoarthritis of right hip.  He presents with the following impairments/functional limitations: weakness, impaired endurance, impaired functional mobility, decreased lower extremity function, pain, decreased ROM, edema, orthopedic precautions.    Rehab Prognosis: Good; patient would benefit from acute skilled PT services to address these deficits and reach maximum level of function.    Recent Surgery: Procedure(s) (LRB):  ROBOTIC ARTHROPLASTY,HIP (Right) 2 Days Post-Op    Plan:     During this hospitalization, patient to be seen BID to address the identified rehab impairments via gait training, therapeutic activities, therapeutic exercises and progress toward the following goals:    Plan of Care Expires:  04/15/24  Pt tolerated session well, educated on safety awareness, proper hydration, HEP, mobility, and pain management strategies to reduce risk of medical complications upon d/c home. All questions/concerns addressed. This note to serve as d/c summary.     Subjective     Chief Complaint: R hip pain  Patient/Family Comments/goals:   Pain/Comfort:  Location - Side 1: Right  Location 1: hip  Pain Addressed 1: Pre-medicate for activity, Reposition, Distraction, Cessation of Activity      Objective:     Communicated with nurse prior to session.  Patient found up in chair with peripheral IV, telemetry upon PT entry to room.     General Precautions: Standard, fall  Orthopedic Precautions: RLE partial weight bearing, RLE anterior precautions  Braces:    Respiratory Status: Room air     Functional Mobility:  Bed Mobility:   pt edu on use of leg  to assist in bed  mobility  Supine to Sit: stand by assistance  Sit to Supine: stand by assistance  Transfers:     Sit to Stand:  stand by assistance with rolling walker  Gait: Pt ambulated 200 ft w rw and SBA, using step through gait pattern at normal pace.        Treatment & Education:  Pt edu on total hip precautions and importance of frequent mobility  Pt completed TAMIKO therex x10 AAROM    Patient left up in chair with all lines intact, call button in reach, nurse notified, and mother present..    GOALS:   Multidisciplinary Problems       Physical Therapy Goals       Not on file              Multidisciplinary Problems (Resolved)          Problem: Physical Therapy    Goal Priority Disciplines Outcome Goal Variances Interventions   Physical Therapy Goal   (Resolved)     PT, PT/OT Met     Description: Pt will improve functional independence by performing:    Bed mobility: SBA  Sit to stand: SBA with rolling walker met  Bed to chair t/f: SBA with Stand Step  with rolling walker  Ambulation x 200'  with SBA with rolling walker Met  1 Step (Curb): Min A  with rolling walker-met  3 Steps: Min A  with B HR-met  Independent with total hip HEP                        Time Tracking:     PT Received On:    PT Start Time: 1414     PT Stop Time: 1428  PT Total Time (min): 14 min     Billable Minutes: Gait Training 14    Treatment Type: Treatment  PT/PTA: PT     Number of PTA visits since last PT visit: 0     04/11/2024

## 2024-04-11 NOTE — PLAN OF CARE
Problem: Physical Therapy  Goal: Physical Therapy Goal  Description: Pt will improve functional independence by performing:    Bed mobility: SBA  Sit to stand: SBA with rolling walker met  Bed to chair t/f: SBA with Stand Step  with rolling walker  Ambulation x 200'  with SBA with rolling walker Met  1 Step (Curb): Min A  with rolling walker-met  3 Steps: Min A  with B HR-met  Independent with total hip HEP   Outcome: Ongoing, Progressing

## 2024-04-11 NOTE — PLAN OF CARE
Pt notified that [facility] is OON. Pt requesting [facility] for outpt therapy.     Faxed orders to [facility]. To start after hh- in 2 weeks. They will contact pt with appt date & time.     Faxed AVS to [email or web address].

## 2024-04-11 NOTE — PT/OT/SLP PROGRESS
Occupational Therapy   Treatment    Name: Cam Pang  MRN: [id number]  Admitting Diagnosis:  Primary osteoarthritis of right hip  2 Days Post-Op    Recommendations:     Discharge Recommendations: Low Intensity Therapy  Discharge Equipment Recommendations:  walker, rolling, bedside commode  Barriers to discharge:  None    Assessment:     Cam Pang is a 54 y.o. male with a medical diagnosis of Primary osteoarthritis of right hip. Performance deficits affecting function are weakness, impaired endurance, impaired self care skills, impaired functional mobility, gait instability, impaired balance, decreased lower extremity function, decreased safety awareness, pain, decreased ROM, orthopedic precautions, impaired muscle length, impaired joint extensibility.     Rehab Prognosis:  Good; patient would benefit from acute skilled OT services to address these deficits and reach maximum level of function.       Plan:     Patient to be seen daily (BID) to address the above listed problems via self-care/home management, therapeutic activities, therapeutic exercises  Plan of Care Expires: 04/16/24  Plan of Care Reviewed with: patient, mother    Subjective     Chief Complaint: no new complaints  Patient/Family Comments/goals: to go home  Pain/Comfort:  Pain Rating 1: 5/10  Location - Side 1: Right  Location - Orientation 1: lateral  Location 1: thigh  Pain Addressed 1: Reposition, Distraction    Objective:     Patient found ambulatory in room/dennis with PT with peripheral IV upon OT entry to room.    General Precautions: Standard, fall    Orthopedic Precautions:RLE anterior precautions, RLE weight bearing as tolerated (75%)  Braces: N/A  Respiratory Status: Room air     Occupational Performance:     Bed Mobility:    Patient completed Rolling/Turning to Left with  supervision  Patient completed Scooting/Bridging with contact guard assistance  Patient completed Sit to Supine with minimum assistance      Functional Mobility/Transfers:  Patient completed Sit <> Stand Transfer with contact guard assistance  with  rolling walker   Functional Mobility: Patient performed FM in hallway with RW and CGA, which progressed to SBA. He was able to tolerate ~200 feet and declined going any further.    Treatment & Education:  Reviewed hip precautions with patient, discussed plan for car transfer this afternoon.    Patient left HOB elevated with all lines intact, call button in reach, and mother present    GOALS:   Multidisciplinary Problems       Occupational Therapy Goals          Problem: Occupational Therapy    Goal Priority Disciplines Outcome Interventions   Occupational Therapy Goal     OT, PT/OT Ongoing, Progressing    Description: Pt will perform LB dressing CGA with AE PRN by d/c.  Pt will perform toileting CGA by d/c. MET  Pt will perform toilet t/f CGA by d/c. MET  Pt will perform shower t/f CGA by d/c. Discontinued goal  Pt will perform car t/f SBA in adherence to pxns by d/c.                        Time Tracking:     OT Date of Treatment: 04/11/24  OT Start Time: 1051  OT Stop Time: 1102  OT Total Time (min): 11 min    Billable Minutes:Therapeutic Activity 11    OT/CARA: OT          4/11/2024

## 2024-04-11 NOTE — PROGRESS NOTES
"No acute events overnight.  Pain controlled.  Resting in bed.    Vital Signs  Temp: 97.9 °F (36.6 °C)  Temp Source: Oral  Pulse: 76  Heart Rate Source: Monitor  Resp: 18  SpO2: (!) 94 %  Pulse Oximetry Type: Intermittent  Flow (L/min): 1  Oxygen Concentration (%): 24  Device (Oxygen Therapy): room air  BP: (!) 150/75  BP Location: Left arm  BP Method: Automatic  Patient Position: Lying  Arousal Level: arouses to voice  Height and Weight  Height: 6' 1" (185.4 cm)  Height Method: Stated  Weight: 125.3 kg (276 lb 3.8 oz)  Weight Method: Standard Scale  BSA (Calculated - sq m): 2.54 sq meters  BMI (Calculated): 36.5  Weight in (lb) to have BMI = 25: 189.1]    +FHL/EHL  Brisk capillary refill distally  Dressing c/d/i  Sensation intact to light touch distally    Recent Lab Results         04/11/24  0413        Anion Gap 6.0       BUN 13.2       BUN/CREAT RATIO 16       Calcium 8.3       Chloride 110       CO2 27       Creatinine 0.82       eGFR >60       Glucose 123       Hematocrit 35.5       Hemoglobin 11.0       MCH 31.1       MCHC 31.0       .3       MPV 10.2       nRBC 0.0       Platelet Count 175       Potassium 4.1       RBC 3.54       RDW 12.9       Sodium 143       WBC 11.60               A/P:  Status post right TAMIKO  Overall patient doing well.  Therapy for mobility and ambulation.  aspirin for DVT PPx   "

## 2024-04-11 NOTE — PT/OT/SLP PROGRESS
Occupational Therapy   Treatment    Name: Cam Pang  MRN: [id number]  Admitting Diagnosis:  Primary osteoarthritis of right hip  2 Days Post-Op    Recommendations:     Discharge Recommendations: Low Intensity Therapy  Discharge Equipment Recommendations:  walker, rolling, bedside commode  Barriers to discharge:  None    Assessment:     Cam Pang is a 54 y.o. male with a medical diagnosis of Primary osteoarthritis of right hip. Performance deficits affecting function are weakness, impaired endurance, decreased ROM, pain, decreased lower extremity function, impaired self care skills, impaired balance, gait instability, impaired functional mobility, orthopedic precautions, impaired muscle length, impaired joint extensibility.     Rehab Prognosis:  Good; patient would benefit from acute skilled OT services to address these deficits and reach maximum level of function.       Plan:     Patient to be seen daily (BID) to address the above listed problems via self-care/home management, therapeutic activities, therapeutic exercises  Plan of Care Expires: 04/16/24  Plan of Care Reviewed with: patient    Subjective     Chief Complaint: stiffness in RLE  Patient/Family Comments/goals: to go home  Pain/Comfort:  Pain Rating 1: 4/10  Location - Side 1: Right  Location - Orientation 1: generalized  Location 1: hip  Pain Addressed 1: Distraction, Reposition, Pre-medicate for activity    Objective:     Patient found HOB elevated with peripheral IV upon OT entry to room.    General Precautions: Standard, fall    Orthopedic Precautions:RLE anterior precautions, RLE partial weight bearing (75%)  Braces: N/A  Respiratory Status: Room air     Occupational Performance:     Bed Mobility:    Patient completed Rolling/Turning to Left with  stand by assistance  Patient completed Scooting/Bridging with stand by assistance  Patient completed Supine to Sit with stand by assistance     Functional  Mobility/Transfers:  Patient completed Sit <> Stand Transfer with stand by assistance  with  rolling walker   Patient completed  Shower Transfer Step Transfer technique with stand by assistance with rolling walker'  Patient completed car transfer step transfer technique with stand by assistance with rolling walker  Functional Mobility: Patient performed FM in hallway with RW and SBA with no LOBs or rest breaks. He was able to tolerate ~130 feet.    Treatment & Education:  Reviewed safe transfer techniques with shower transfers for his WIS at home. Patient inquired about tennis balls for RW at home, educated him on means of obtaining them locally and online.     Patient left ambulatory in room/dennis with all lines intact and Yana, PT present    GOALS:   Multidisciplinary Problems       Occupational Therapy Goals       Not on file              Multidisciplinary Problems (Resolved)          Problem: Occupational Therapy    Goal Priority Disciplines Outcome Interventions   Occupational Therapy Goal   (Resolved)     OT, PT/OT Met    Description: Pt will perform LB dressing CGA with AE PRN by d/c.  Pt will perform toileting CGA by d/c. MET  Pt will perform toilet t/f CGA by d/c. MET  Pt will perform shower t/f CGA by d/c. Discontinued goal  Pt will perform car t/f SBA in adherence to pxns by d/c.                        Time Tracking:     OT Date of Treatment: 04/11/24  OT Start Time: 1404  OT Stop Time: 1413  OT Total Time (min): 9 min    Billable Minutes:Therapeutic Activity 9    OT/CARA: OT          4/11/2024

## 2024-04-11 NOTE — PLAN OF CARE
Problem: Adult Inpatient Plan of Care  Goal: Plan of Care Review  Outcome: Met  Goal: Patient-Specific Goal (Individualized)  Outcome: Met  Goal: Absence of Hospital-Acquired Illness or Injury  Outcome: Met  Goal: Optimal Comfort and Wellbeing  Outcome: Met  Goal: Readiness for Transition of Care  Outcome: Met     Problem: Diabetes Comorbidity  Goal: Blood Glucose Level Within Targeted Range  Outcome: Met     Problem: Infection  Goal: Absence of Infection Signs and Symptoms  Outcome: Met     Problem: Hypertension Comorbidity  Goal: Blood Pressure in Desired Range  Outcome: Met     Problem: Obstructive Sleep Apnea Risk or Actual Comorbidity Management  Goal: Unobstructed Breathing During Sleep  Outcome: Met     Problem: Pain Acute  Goal: Acceptable Pain Control and Functional Ability  Outcome: Met     Problem: Adjustment to Surgery (Hip Arthroplasty)  Goal: Optimal Coping  Outcome: Met     Problem: Bleeding (Hip Arthroplasty)  Goal: Absence of Bleeding  Outcome: Met     Problem: Bowel Motility Impaired (Hip Arthroplasty)  Goal: Effective Bowel Elimination  Outcome: Met     Problem: Fluid and Electrolyte Imbalance (Hip Arthroplasty)  Goal: Fluid and Electrolyte Balance  Outcome: Met     Problem: Functional Ability Impaired (Hip Arthroplasty)  Goal: Optimal Functional Ability  Outcome: Met     Problem: Infection (Hip Arthroplasty)  Goal: Absence of Infection Signs and Symptoms  Outcome: Met     Problem: Neurovascular Compromise (Hip Arthroplasty)  Goal: Intact Neurovascular Status  Outcome: Met     Problem: Ongoing Anesthesia Effects (Hip Arthroplasty)  Goal: Anesthesia/Sedation Recovery  Outcome: Met     Problem: Pain (Hip Arthroplasty)  Goal: Acceptable Pain Control  Outcome: Met     Problem: Postoperative Nausea and Vomiting (Hip Arthroplasty)  Goal: Nausea and Vomiting Relief  Outcome: Met     Problem: Postoperative Urinary Retention (Hip Arthroplasty)  Goal: Effective Urinary Elimination  Outcome: Met      Problem: Respiratory Compromise (Hip Arthroplasty)  Goal: Effective Oxygenation and Ventilation  Outcome: Met     Problem: Fall Injury Risk  Goal: Absence of Fall and Fall-Related Injury  Outcome: Met     Problem: Physical Therapy  Goal: Physical Therapy Goal  Description: Pt will improve functional independence by performing:    Bed mobility: SBA  Sit to stand: SBA with rolling walker met  Bed to chair t/f: SBA with Stand Step  with rolling walker  Ambulation x 200'  with SBA with rolling walker Met  1 Step (Curb): Min A  with rolling walker-met  3 Steps: Min A  with B HR-met  Independent with total hip HEP   Outcome: Met     Problem: Occupational Therapy  Goal: Occupational Therapy Goal  Description: Pt will perform LB dressing CGA with AE PRN by d/c.  Pt will perform toileting CGA by d/c. MET  Pt will perform toilet t/f CGA by d/c. MET  Pt will perform shower t/f CGA by d/c. Discontinued goal  Pt will perform car t/f SBA in adherence to pxns by d/c.   Outcome: Met

## 2024-04-11 NOTE — PT/OT/SLP PROGRESS
"Physical Therapy Treatment    Patient Name:  Cam Pang   MRN:  [id number]    Recommendations:     Discharge Recommendations: Low Intensity Therapy  Discharge Equipment Recommendations: walker, rolling  Barriers to discharge: None    Assessment:     Cam Pang is a 54 y.o. male admitted with a medical diagnosis of Primary osteoarthritis of right hip.  He presents with the following impairments/functional limitations: weakness, impaired endurance, impaired functional mobility, decreased lower extremity function, pain, decreased ROM, edema, orthopedic precautions .    Rehab Prognosis: Good; patient would benefit from acute skilled PT services to address these deficits and reach maximum level of function.    Recent Surgery: Procedure(s) (LRB):  ROBOTIC ARTHROPLASTY,HIP (Right) 2 Days Post-Op    Plan:     During this hospitalization, patient to be seen BID to address the identified rehab impairments via gait training, therapeutic activities, therapeutic exercises and progress toward the following goals:    Plan of Care Expires:  04/15/24    Subjective     Chief Complaint: R hip pain  Patient/Family Comments/goals:   Pain/Comfort:  Location - Side 1: Right  Location 1: hip  Pain Addressed 1: Pre-medicate for activity, Reposition, Distraction, Cessation of Activity      Objective:     Communicated with nurse prior to session.  Patient found up in chair with peripheral IV, telemetry upon PT entry to room.     General Precautions: Standard, fall  Orthopedic Precautions: RLE partial weight bearing, RLE anterior precautions  Braces:    Respiratory Status: Room air     Functional Mobility:  Transfers:     Sit to Stand:  stand by assistance with rolling walker  Gait: Pt ambulated 250 ft w rw and SBA, using step through gait pattern at normal pace  Stairs:  Pt ascended/descended 4" curb step with Rolling Walker with no handrails with Contact Guard Assistance.           Treatment & Education:  Pt edu on " total hip precautions, WB status, and importance of frequent mobility  Pt completed TAMIKO therex x10 AAROM      Patient left up in chair with all lines intact, call button in reach, nurse notified, and mother present..    GOALS:   Multidisciplinary Problems       Physical Therapy Goals          Problem: Physical Therapy    Goal Priority Disciplines Outcome Goal Variances Interventions   Physical Therapy Goal     PT, PT/OT Ongoing, Progressing     Description: Pt will improve functional independence by performing:    Bed mobility: SBA  Sit to stand: SBA with rolling walker met  Bed to chair t/f: SBA with Stand Step  with rolling walker  Ambulation x 200'  with SBA with rolling walker Met  1 Step (Curb): Min A  with rolling walker-met  3 Steps: Min A  with B HR-met  Independent with total hip HEP                        Time Tracking:     PT Received On:    PT Start Time: 1040     PT Stop Time: 1050  PT Total Time (min): 10 min     Billable Minutes: Gait Training 10    Treatment Type: Treatment  PT/PTA: PT     Number of PTA visits since last PT visit: 0     04/11/2024

## 2024-04-11 NOTE — NURSING
Nurse Note:   Pt left in wc with staff member. Coverlets given. Sister at bedside for all discharge instructions. Pt advised to call MD office with any questions/concerns.     4/11/2024   3:41PM      Perception of Care - Joint Replacement Population Total Joint Surgery List: HIP    Patient able to verbalize one way to treat/prevent SWELLING at home   [x] Yes   [] No   [] Further Education Provided        Attending Nurse:  Carmencita

## 2024-04-11 NOTE — PROGRESS NOTES
No acute events overnight. Only complaint is pain, however; patient cannot keep his eyes open.     A/P:  Status post right Total Hip  Overall patient doing well.  Therapy for mobility and ambulation.    +FHL/EHL  BCR distally  Dressing c/d/i  SILT distally    Co-morbidities Mgt:  CAD - home meds resumed    Diabetes Mellitus Type 2 - home meds restarted, CBGs AC&HS with insulin sliding scale, tight glycemic control    Hypertension - Home meds restarted, BP stable at at baseline    Anxiety/Depression - home meds resumed    Sleep Apnea - CPAP in use at bedtime, Oxygen PRN, Monitor oxygen saturation    Obesity    Schizophrenia - home meds resumed      Assessment and Plan:   Oversedation, likely due to low pain tolerance and low pain medication tolerance.  Medication adjustments until balance is achieved.  Continue to hydrate and Oxygen PRN    Impaired Mobility - PT for gait training and mobility, OT for ADLs    Impaired ADLs - OT for ADLs    Uncontrolled pain despite current pain medication regimen- pain medication adjustments, Toradol 30 mg x 1 dose, responding well.  We will continue to monitor and plan for discharge once the patient's pain is at a tolerable level and once the patient is tolerating pain medication appropriately.    Will keep hospitalized at least 1 more night, adjust plan of care as needed and plan for discharge once pain is controlled, the patient is tolerating pain medications appropriately, all systems are working appropriately, the patient is stable and at baseline.    DVT Prophylaxis: ASA 81mg BID, MOIRA Hose, SCDs, Early ambulation  GI Prophylaxis: Pepcid BID  Bowel Regimen: Reglan/Miralax/Senokot S/Colace  Pain Controlled  (+ Voiding) (+ Flatus)       Deyanira Burgos FNP-C  Orthopedic Surgery  [facility] - [facility]     This note was created with the assistance of voice recognition software or phone dictation.  There may be transcription errors as a result of using this  technology however minimal. Effort has been made to assure accuracy of transcription but any obvious errors or omissions should be clarified with the author of the document.

## 2024-04-11 NOTE — PLAN OF CARE
Problem: Occupational Therapy  Goal: Occupational Therapy Goal  Description: Pt will perform LB dressing CGA with AE PRN by d/c.  Pt will perform toileting CGA by d/c. MET  Pt will perform toilet t/f CGA by d/c. MET  Pt will perform shower t/f CGA by d/c. Discontinued goal  Pt will perform car t/f SBA in adherence to pxns by d/c.   Outcome: Ongoing, Progressing

## 2024-04-12 ENCOUNTER — PATIENT OUTREACH (OUTPATIENT)
Dept: ADMINISTRATIVE | Facility: CLINIC | Age: 54
End: 2024-04-12
Payer: MEDICARE

## 2024-04-12 LAB — VIEW PATHOLOGY REPORT (RELIAPATH): NORMAL

## 2024-04-12 NOTE — PROGRESS NOTES
C3 nurse spoke with Cam Pang  for a TCC post hospital discharge follow up call. The patient has a scheduled HOSFU appointment with  Ke Joe MD (Orthopedic Surgery) on 5/8/2024; Follow-Up on Wednesday 5/8/24 at 9:45 am with Cali Richards PA . Pt also advised to contact Non [facility] PCP for HOSFU in 5-7 days post discharge date.

## 2024-04-15 ENCOUNTER — TELEPHONE (OUTPATIENT)
Dept: ORTHOPEDICS | Facility: CLINIC | Age: 54
End: 2024-04-15
Payer: MEDICARE

## 2024-04-15 DIAGNOSIS — Z96.641 STATUS POST RIGHT HIP REPLACEMENT: ICD-10-CM

## 2024-04-15 DIAGNOSIS — Z96.641 STATUS POST RIGHT HIP REPLACEMENT: Primary | ICD-10-CM

## 2024-04-15 RX ORDER — HYDROCODONE BITARTRATE AND ACETAMINOPHEN 10; 325 MG/1; MG/1
1 TABLET ORAL EVERY 4 HOURS PRN
Qty: 42 TABLET | Refills: 0 | Status: SHIPPED | OUTPATIENT
Start: 2024-04-15 | End: 2024-04-22

## 2024-04-15 RX ORDER — HYDROCODONE BITARTRATE AND ACETAMINOPHEN 10; 325 MG/1; MG/1
1 TABLET ORAL EVERY 4 HOURS PRN
Qty: 42 TABLET | Refills: 0 | Status: SHIPPED | OUTPATIENT
Start: 2024-04-15 | End: 2024-04-15 | Stop reason: SDUPTHER

## 2024-04-15 NOTE — TELEPHONE ENCOUNTER
----- Message from Leelee Gupta MA sent at 4/15/2024 11:14 AM CDT -----    ----- Message -----  From: Cali Moser PA  Sent: 4/15/2024  10:18 AM CDT  To: Leelee Gupta MA    I have switched him to Norco 10 every 4 hours needed for pain.  He should be completing his Toradol prescription tomorrow as it was written for 5 days.  Once that is completed he can try some ibuprofen or Aleve.  That should help.  ----- Message -----  From: Leelee Gupta MA  Sent: 4/15/2024   9:29 AM CDT  To: NARCISA Thomas    Patient left a VM stating his 7.5 prescription isnt helping much what else should they be doing please let me know the next steps.

## 2024-04-15 NOTE — TELEPHONE ENCOUNTER
Patient called left a VM stating that his pharmacy didn't have the NORCO 10 in stock  Called the preferred pharmacy on file to confirm it they only had 7.5 and 5  Spoke with Marli for Dr. Joe he wanted to cancel the script and have it sent to another pharmacy.  Spoke with the patient to get a new pharmacy [facility] in [place] and have him check with them in the morning but to let us know if he has any more issues, patient verbally understood and agreed with this plan.

## 2024-04-18 NOTE — DISCHARGE SUMMARY
[facility]  Discharge Note  Short Stay    Admit Date: 4/9/2024    Discharge Date and Time: 4/11/2024  3:30 PM     Attending Physician: No att. providers found     Discharge Provider: Chi Joe    Diagnoses:  Active Hospital Problems    Diagnosis  POA    *Primary osteoarthritis of right hip [M16.11]  Yes      Resolved Hospital Problems   No resolved problems to display.       Discharged Condition: good    Hospital Course:   Patient presented for elective robotic-assisted right total hip arthroplasty The patient had no complications during the hospital stay and was discharged home in stable condition partial weightbearing with the assistance of a walker. Consults for physical therapy and rehab were given to the patient as well as a follow-up appointment in our office in 4 weeks for reevaluation. The patient was instructed on wound care and dressing changes as well as to continue MOIRA hose while at home. Prescriptions were given for continued DVT prophylaxis and pain control. Home medications were resumed please see discharge med rec for these.     Final Diagnoses: Same as principal problem.    Disposition: Home or Self Care    Follow up/Patient Instructions:    Medications:  Reconciled Home Medications:      Medication List        START taking these medications      cefadroxil 500 MG Cap  Commonly known as: DURICEF  Take 1 capsule (500 mg total) by mouth every 12 (twelve) hours. for 7 days     HYDROcodone-acetaminophen 7.5-325 mg per tablet  Commonly known as: NORCO  Take 1 tablet by mouth every 4 (four) hours as needed for Pain.     polyethylene glycol 17 gram Pwpk  Commonly known as: GLYCOLAX  Take 17 g by mouth once daily. for 14 days            CHANGE how you take these medications      aspirin 81 MG EC tablet  Commonly known as: ECOTRIN  Take 1 tablet (81 mg total) by mouth every morning. Increase to twice a day for 6 weeks post-op for blood clot prevention.  What changed: additional  "instructions            CONTINUE taking these medications      busPIRone 15 MG tablet  Commonly known as: BUSPAR  Take 15 mg by mouth 3 (three) times daily.     carvediloL 6.25 MG tablet  Commonly known as: COREG  Take 12.5 mg by mouth 2 (two) times daily. Pt takes 12.5mg     diclofenac sodium 1 % Gel  Commonly known as: VOLTAREN  Apply 2 g topically 4 (four) times daily as needed (pain). Do not exceed 32 grams/day: do not to exceed 8 grams/day/single joint of upper extremities; do not to exceed 16 grams/day/single joint of lower extremities.  Please request refill of this medication from your PCP.     * FLUoxetine 20 MG capsule  Take 20 mg by mouth once daily. Takes 40mg in AM and 20mg at noon     * FLUoxetine 20 MG capsule  Take 40 mg by mouth once daily.     fluticasone propionate 50 mcg/actuation nasal spray  Commonly known as: FLONASE  1 spray by Each Nostril route.     gabapentin 600 MG tablet  Commonly known as: NEURONTIN  Take 600 mg by mouth 4 (four) times daily.  Notes to patient: DECREASE DOSE AND FREQUENCY WHILE ON NARCOTICS     hydrOXYzine pamoate 50 MG Cap  Commonly known as: VISTARIL  Take 100 mg by mouth nightly as needed (Insomnia).  Notes to patient: Use caution in combination with narcotics     levocetirizine 5 MG tablet  Commonly known as: XYZAL  Take 5 mg by mouth every evening.     lisinopriL 40 MG tablet  Commonly known as: PRINIVIL,ZESTRIL  Take 40 mg by mouth once daily.     MOUNJARO 5 mg/0.5 mL Pnij  Generic drug: tirzepatide  Inject 5 mg into the skin every 7 days.     pen needle, diabetic 31 gauge x 5/16" Ndle  USE THREE TIMES WITH NOVOLOG INSULIN AND DAILY WITH BASAGLAR     QUVIVIQ 25 mg Tab  Generic drug: daridorexant  Take 25 mg by mouth every evening.     REXULTI 0.25 mg Tab  Generic drug: brexpiprazole  Take 0.25 mg by mouth every evening.     rosuvastatin 20 MG tablet  Commonly known as: CRESTOR  Take 1 tablet by mouth every morning.           * This list has 2 medication(s) that " are the same as other medications prescribed for you. Read the directions carefully, and ask your doctor or other care provider to review them with you.                STOP taking these medications      zolpidem 10 mg Tab  Commonly known as: AMBIEN  Notes to patient: Use CAUTION IN COMBINATION WITH NARCOTICS            ASK your doctor about these medications      ketorolac 10 mg tablet  Commonly known as: TORADOL  Take 1 tablet (10 mg total) by mouth every 8 (eight) hours. for 5 days  Ask about: Should I take this medication?            Discharge Procedure Orders   Ambulatory referral/consult to Home Health   Standing Status: Future   Referral Priority: Routine Referral Type: Home Health   Referral Reason: Specialty Services Required   Requested Specialty: Home Health Services   Number of Visits Requested: 1      Follow-up Information       Ke Joe MD Follow up on 5/8/2024.    Specialty: Orthopedic Surgery  Why: Follow-Up on Wednesday 5/8/24 at 9:45 am with Cali Richards PA  Contact information:  [address] [facility]  [address]  [place] [address]  [phone]               [facility] Follow up.    Why: This is home health agency. Home health will provide therapy & dressing changes for 2 weeks. Call if you have questions or concerns.  Contact information:  [address] [place], [place] [address] [place] [address]  [phone]               [facility] [place] - Follow up.    Specialty: Physical Therapy  Why: This is the outpatient therapy facility-- to start after home health.  They will contact pt with appt date & time, Call if you have questions or concerns.  Contact information:  [address] [facility] [address]  [phone]                             Discharge Procedure Orders (must include Diet, Follow-up, Activity):   Discharge Procedure Orders (must include Diet, Follow-up, Activity)   Ambulatory referral/consult to Home Health   Standing Status: Future   Referral Priority: Routine  Referral Type: Home Health   Referral Reason: Specialty Services Required   Requested Specialty: Home Health Services   Number of Visits Requested: 1

## 2024-04-22 ENCOUNTER — TELEPHONE (OUTPATIENT)
Dept: ORTHOPEDICS | Facility: CLINIC | Age: 54
End: 2024-04-22
Payer: MEDICARE

## 2024-04-22 DIAGNOSIS — Z96.641 STATUS POST RIGHT HIP REPLACEMENT: ICD-10-CM

## 2024-04-22 RX ORDER — HYDROCODONE BITARTRATE AND ACETAMINOPHEN 10; 325 MG/1; MG/1
1 TABLET ORAL EVERY 4 HOURS PRN
Qty: 42 TABLET | Refills: 0 | Status: CANCELLED | OUTPATIENT
Start: 2024-04-22 | End: 2024-04-29

## 2024-04-22 NOTE — TELEPHONE ENCOUNTER
----- Message from Leelee Gupta MA sent at 4/22/2024  1:30 PM CDT -----  Wanted a return call due to pain and swelling and a refill

## 2024-04-22 NOTE — TELEPHONE ENCOUNTER
Patient called stating that he would like a refill of his pain medicine.     Patient complaints of pain that radiates down into the operative leg. He states that he has been icing, elevating higher than his heart, doing PT 2 times a week, and taking tylenol and pain medicine but nothing is helping.     Called the patient back to let him know that I will send a refill request to the provider and see if there is anything that we can do to try and help relieve some pain.     I did suggest biofreeze to rub on the knee pain but NOT on the hip.     He verbalized a clear understanding.

## 2024-04-23 DIAGNOSIS — Z96.641 STATUS POST RIGHT HIP REPLACEMENT: Primary | ICD-10-CM

## 2024-04-23 RX ORDER — IBUPROFEN 600 MG/1
600 TABLET ORAL 3 TIMES DAILY
Qty: 21 TABLET | Refills: 0 | Status: SHIPPED | OUTPATIENT
Start: 2024-04-23 | End: 2024-04-30

## 2024-04-23 RX ORDER — HYDROCODONE BITARTRATE AND ACETAMINOPHEN 10; 325 MG/1; MG/1
1 TABLET ORAL EVERY 4 HOURS PRN
Qty: 42 TABLET | Refills: 0 | Status: SHIPPED | OUTPATIENT
Start: 2024-04-23 | End: 2024-04-30

## 2024-04-23 NOTE — TELEPHONE ENCOUNTER
I called the patient to relay Cali's message.     He stated that in the past, the ibuprofen hurt his stomach.     I stated for him to eat first and then try taking the ibuprofen. If it starts to hurt the stomach, then stop the medicine.     He verbalized a clear understanding.

## 2024-04-29 ENCOUNTER — TELEPHONE (OUTPATIENT)
Dept: ORTHOPEDICS | Facility: CLINIC | Age: 54
End: 2024-04-29
Payer: MEDICARE

## 2024-04-29 DIAGNOSIS — Z96.641 STATUS POST RIGHT HIP REPLACEMENT: Primary | ICD-10-CM

## 2024-04-29 RX ORDER — HYDROCODONE BITARTRATE AND ACETAMINOPHEN 10; 325 MG/1; MG/1
1 TABLET ORAL EVERY 6 HOURS PRN
Qty: 28 TABLET | Refills: 0 | Status: SHIPPED | OUTPATIENT
Start: 2024-04-29 | End: 2024-05-06

## 2024-04-29 NOTE — TELEPHONE ENCOUNTER
I called the patient to let him know that I received his voice mail.I relayed Cali's message.     She verbalized a clear understanding.

## 2024-05-08 ENCOUNTER — HOSPITAL ENCOUNTER (OUTPATIENT)
Dept: RADIOLOGY | Facility: CLINIC | Age: 54
Discharge: HOME | End: 2024-05-08
Attending: PHYSICIAN ASSISTANT
Payer: MEDICARE

## 2024-05-08 ENCOUNTER — OFFICE VISIT (OUTPATIENT)
Dept: ORTHOPEDICS | Facility: CLINIC | Age: 54
End: 2024-05-08
Payer: MEDICARE

## 2024-05-08 VITALS
WEIGHT: 271 LBS | HEART RATE: 82 BPM | SYSTOLIC BLOOD PRESSURE: 149 MMHG | DIASTOLIC BLOOD PRESSURE: 88 MMHG | BODY MASS INDEX: 35.92 KG/M2 | HEIGHT: 73 IN

## 2024-05-08 DIAGNOSIS — Z96.641 STATUS POST RIGHT HIP REPLACEMENT: ICD-10-CM

## 2024-05-08 DIAGNOSIS — Z96.641 STATUS POST RIGHT HIP REPLACEMENT: Primary | ICD-10-CM

## 2024-05-08 PROCEDURE — 99024 POSTOP FOLLOW-UP VISIT: CPT | Mod: ,,, | Performed by: PHYSICIAN ASSISTANT

## 2024-05-08 PROCEDURE — 1159F MED LIST DOCD IN RCRD: CPT | Mod: CPTII,,, | Performed by: PHYSICIAN ASSISTANT

## 2024-05-08 PROCEDURE — 73502 X-RAY EXAM HIP UNI 2-3 VIEWS: CPT | Mod: RT,,, | Performed by: PHYSICIAN ASSISTANT

## 2024-05-08 PROCEDURE — 4010F ACE/ARB THERAPY RXD/TAKEN: CPT | Mod: CPTII,,, | Performed by: PHYSICIAN ASSISTANT

## 2024-05-08 PROCEDURE — 3079F DIAST BP 80-89 MM HG: CPT | Mod: CPTII,,, | Performed by: PHYSICIAN ASSISTANT

## 2024-05-08 PROCEDURE — 3044F HG A1C LEVEL LT 7.0%: CPT | Mod: CPTII,,, | Performed by: PHYSICIAN ASSISTANT

## 2024-05-08 PROCEDURE — 3077F SYST BP >= 140 MM HG: CPT | Mod: CPTII,,, | Performed by: PHYSICIAN ASSISTANT

## 2024-05-08 PROCEDURE — 1160F RVW MEDS BY RX/DR IN RCRD: CPT | Mod: CPTII,,, | Performed by: PHYSICIAN ASSISTANT

## 2024-05-08 RX ORDER — HYDROCODONE BITARTRATE AND ACETAMINOPHEN 10; 325 MG/1; MG/1
1 TABLET ORAL EVERY 6 HOURS PRN
Qty: 28 TABLET | Refills: 0 | Status: SHIPPED | OUTPATIENT
Start: 2024-05-08 | End: 2024-05-13 | Stop reason: SDUPTHER

## 2024-05-08 RX ORDER — METHOCARBAMOL 750 MG/1
750 TABLET, FILM COATED ORAL 3 TIMES DAILY
Qty: 21 TABLET | Refills: 0 | Status: SHIPPED | OUTPATIENT
Start: 2024-05-08 | End: 2024-05-13 | Stop reason: SDUPTHER

## 2024-05-08 NOTE — PROGRESS NOTES
Chief Complaint:   Chief Complaint   Patient presents with    Right Hip - Post-op Evaluation     Pt states he is experiencing pain and discomfort and muscle spasms. Pt needs refill for both medications. Pt states he can't alternate Norco and NSAIDs due to severe stomach pain and cramps. Pt rather take only Norco to manage the pain. Pt had a MRI done recently and would like to discuss the findings with our PA.        History of present illness:    54-year-old male presents office today for follow-up on his right hip.  He is now one-month S/P right total hip arthroplasty.  He is progressing but still has some pain and spasms.  He is also dealing with back pain and had a recent MRI.  He is ambulating with a walker and attending physical therapy    Past Medical History:   Diagnosis Date    Coronary artery disease     Depression     Diabetes mellitus, type 2     Hypertension     Primary osteoarthritis of right hip 04/09/2024    Schizophrenia, unspecified     Sleep apnea, unspecified     cpap       Past Surgical History:   Procedure Laterality Date    APPENDECTOMY      arm fracture surgery Left     cardiac stents      2x    ORIF FACIAL FRACTURE Left     ROBOTIC ARTHROPLASTY, HIP Right 4/9/2024    Procedure: ROBOTIC ARTHROPLASTY,HIP;  Surgeon: Ke Joe MD;  Location: [facility];  Service: Orthopedics;  Laterality: Right;  Rk    SINUS SURGERY         Current Outpatient Medications   Medication Sig    aspirin (ECOTRIN) 81 MG EC tablet Take 1 tablet (81 mg total) by mouth every morning. Increase to twice a day for 6 weeks post-op for blood clot prevention.    brexpiprazole (REXULTI) 0.25 mg Tab Take 0.25 mg by mouth every evening.    busPIRone (BUSPAR) 15 MG tablet Take 15 mg by mouth 3 (three) times daily.    carvediloL (COREG) 6.25 MG tablet Take 12.5 mg by mouth 2 (two) times daily. Pt takes 12.5mg    daridorexant (QUVIVIQ) 25 mg Tab Take 25 mg by mouth every evening.    FLUoxetine 20 MG capsule Take 40 mg by mouth  "once daily.    FLUoxetine 20 MG capsule Take 20 mg by mouth once daily. Takes 40mg in AM and 20mg at noon    fluticasone propionate (FLONASE) 50 mcg/actuation nasal spray 1 spray by Each Nostril route.    gabapentin (NEURONTIN) 600 MG tablet Take 600 mg by mouth 4 (four) times daily.    hydrOXYzine pamoate (VISTARIL) 50 MG Cap Take 100 mg by mouth nightly as needed (Insomnia).    levocetirizine (XYZAL) 5 MG tablet Take 5 mg by mouth every evening.    lisinopriL (PRINIVIL,ZESTRIL) 40 MG tablet Take 40 mg by mouth once daily.    MOUNJARO 5 mg/0.5 mL PnIj Inject 5 mg into the skin every 7 days.    pen needle, diabetic 31 gauge x 5/16" Ndle USE THREE TIMES WITH NOVOLOG INSULIN AND DAILY WITH BASAGLAR    rosuvastatin (CRESTOR) 20 MG tablet Take 1 tablet by mouth every morning.    diclofenac sodium (VOLTAREN) 1 % Gel Apply 2 g topically 4 (four) times daily as needed (pain). Do not exceed 32 grams/day: do not to exceed 8 grams/day/single joint of upper extremities; do not to exceed 16 grams/day/single joint of lower extremities.  Please request refill of this medication from your PCP.     No current facility-administered medications for this visit.       Review of patient's allergies indicates:   Allergen Reactions    Corticosteroids (glucocorticoids) Other (See Comments)     Makes his sugar levels increase drastically.    Meperidine Itching and Nausea And Vomiting       Family History   Family history unknown: Yes       Social History     Socioeconomic History    Marital status:    Tobacco Use    Smoking status: Former     Average packs/day: 2.0 packs/day for 30.0 years (60.0 ttl pk-yrs)     Types: Cigarettes     Start date:      Quit date:      Years since quittin.3    Smokeless tobacco: Never    Tobacco comments:     Quit 2 years ago    Substance and Sexual Activity    Alcohol use: Not Currently     Comment: Quit 5 years ago    Drug use: Not Currently     Comment: Quit 5 years ago.    Sexual " activity: Not Currently     Social Determinants of Health     Financial Resource Strain: Unknown (9/12/2019)    [facility]    Overall Financial Resource Strain (CARDIA)     Difficulty of Paying Living Expenses: Patient declined   Food Insecurity: Unknown (9/12/2019)    [facility], [facility]    Hunger Vital Sign     Worried About Running Out of Food in the Last Year: Patient declined     Ran Out of Food in the Last Year: Patient declined   Transportation Needs: Unknown (9/12/2019)    [facility], [facility]    PRAPARE - Transportation     Lack of Transportation (Medical): Patient declined     Lack of Transportation (Non-Medical): Patient declined   Physical Activity: Unknown (9/12/2019)    [facility], [facility]    Exercise Vital Sign     Days of Exercise per Week: Patient declined     Minutes of Exercise per Session: Patient declined   Stress: Stress Concern Present (9/12/2019)    [facility], [facility]    [employer] - Occupational Stress Questionnaire     Feeling of Stress : To some extent         Review of Systems:    Constitution:   Denies chills, fever, and  "sweats.  HENT:   Denies headaches or blurry vision.  Cardiovascular:  Denies chest pain or irregular heart beat.  Respiratory:   Denies cough or shortness of breath.  Gastrointestinal:  Denies abdominal pain, nausea, or vomiting.  Musculoskeletal:   Denies muscle cramps.  Neurological:   Denies dizziness or focal weakness.  Psychiatric/Behavior: Normal mental status.  Hematology/Lymph:  Denies bleeding problem or easy bruising/bleeding.  Skin:    Denies rash or suspicious lesions.    Examination:    Vital Signs:    Vitals:    05/08/24 0830 05/08/24 0831   BP:  (!) 149/88   Pulse:  82   Weight: 122.9 kg (271 lb) 122.9 kg (271 lb)   Height: 6' 1" (1.854 m) 6' 1" (1.854 m)   PainSc:    9       Body mass index is 35.75 kg/m².    Constitution:   Well-developed, well nourished patient in no acute distress.  Neurological:   Alert and oriented x 3 and cooperative to examination.     Psychiatric/Behavior: Normal mental status.  Respiratory:   No shortness of breath.  Nonlabored breathing  Cardiovascular:           Regular rate and rhythm  Eyes:    Extraoccular muscles intact  Skin:    No scars, rash or suspicious lesions.    Physical Exam:     Right Hip    No swelling, redness or increased heat.    Wound healing normal. Surgical incision C/D/I     Motion was normal.     Weakness of the  hip was observed.    Sensation intact distally    Intact pedal pulses    X-Ray Hip:   Complete right hip x-ray with two or more views of the AP and lateral aspects were performed.   Impressions Radiology Test   X-ray of hip was performed showed intact  hip prosthesis    MRI of the lumbar spine reviewed and shows disc bulging with protrusion at multiple levels but there is herniation at L4-5 that has worsened since previous MRI with effacement of the left neural foramen and progression of disc space narrowing        Assessment:     Status post right hip replacement  -     X-Ray Hip 2 or 3 views Right (with Pelvis when performed); Future; " Expected date: 05/08/2024        Plan:      He will continue with his physical therapy regarding his hip.  We have discussed that his postoperative course can be complicated with his herniated disc in regards to pain in lack of mobility.  I will refill his Norco 10 and his Robaxin but he will we will need to taper off of this medication for his hip and he can follow up with his spine surgeon, Dr Maciel regarding his disc herniation and pain management.  We will see him back in 3 months' time for repeat evaluation of the right hip        No follow-ups on file.    DISCLAIMER: This note may have been dictated using voice recognition software and may contain grammatical errors.

## 2024-05-13 DIAGNOSIS — Z96.641 STATUS POST RIGHT HIP REPLACEMENT: ICD-10-CM

## 2024-05-14 RX ORDER — HYDROCODONE BITARTRATE AND ACETAMINOPHEN 10; 325 MG/1; MG/1
1 TABLET ORAL EVERY 6 HOURS PRN
Qty: 28 TABLET | Refills: 0 | Status: SHIPPED | OUTPATIENT
Start: 2024-05-14 | End: 2024-05-21 | Stop reason: SDUPTHER

## 2024-05-14 RX ORDER — METHOCARBAMOL 750 MG/1
750 TABLET, FILM COATED ORAL 3 TIMES DAILY
Qty: 21 TABLET | Refills: 0 | Status: SHIPPED | OUTPATIENT
Start: 2024-05-14 | End: 2024-05-21

## 2024-05-20 ENCOUNTER — HOSPITAL ENCOUNTER (EMERGENCY)
Facility: HOSPITAL | Age: 54
Discharge: HOME | End: 2024-05-20
Attending: EMERGENCY MEDICINE
Payer: MEDICARE

## 2024-05-20 VITALS
BODY MASS INDEX: 35.78 KG/M2 | RESPIRATION RATE: 18 BRPM | WEIGHT: 270 LBS | TEMPERATURE: 99 F | SYSTOLIC BLOOD PRESSURE: 164 MMHG | HEART RATE: 64 BPM | DIASTOLIC BLOOD PRESSURE: 90 MMHG | HEIGHT: 73 IN | OXYGEN SATURATION: 98 %

## 2024-05-20 DIAGNOSIS — M25.561 ACUTE PAIN OF RIGHT KNEE: ICD-10-CM

## 2024-05-20 DIAGNOSIS — M25.551 ACUTE POSTOPERATIVE PAIN OF RIGHT HIP: Primary | ICD-10-CM

## 2024-05-20 DIAGNOSIS — G89.18 ACUTE POSTOPERATIVE PAIN OF RIGHT HIP: Primary | ICD-10-CM

## 2024-05-20 DIAGNOSIS — M51.9 LUMBAR DISC DISEASE: ICD-10-CM

## 2024-05-20 DIAGNOSIS — M51.36 DDD (DEGENERATIVE DISC DISEASE), LUMBAR: Primary | ICD-10-CM
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
## 2024-05-20 PROCEDURE — 25000003 PHARM REV CODE 250: Performed by: EMERGENCY MEDICINE

## 2024-05-20 PROCEDURE — 99284 EMERGENCY DEPT VISIT MOD MDM: CPT | Mod: 25

## 2024-05-20 PROCEDURE — 63600175 PHARM REV CODE 636 W HCPCS: Mod: JZ,JG | Performed by: EMERGENCY MEDICINE

## 2024-05-20 PROCEDURE — 96372 THER/PROPH/DIAG INJ SC/IM: CPT | Performed by: EMERGENCY MEDICINE

## 2024-05-20 RX ORDER — HYDROCODONE BITARTRATE AND ACETAMINOPHEN 10; 325 MG/1; MG/1
1 TABLET ORAL
Status: COMPLETED | OUTPATIENT
Start: 2024-05-20 | End: 2024-05-20

## 2024-05-20 RX ORDER — ONDANSETRON 4 MG/1
4 TABLET, ORALLY DISINTEGRATING ORAL
Status: COMPLETED | OUTPATIENT
Start: 2024-05-20 | End: 2024-05-20

## 2024-05-20 RX ORDER — MORPHINE SULFATE 4 MG/ML
4 INJECTION, SOLUTION INTRAMUSCULAR; INTRAVENOUS
Status: COMPLETED | OUTPATIENT
Start: 2024-05-20 | End: 2024-05-20

## 2024-05-20 RX ADMIN — MORPHINE SULFATE 4 MG: 4 INJECTION, SOLUTION INTRAMUSCULAR; INTRAVENOUS at 06:05

## 2024-05-20 RX ADMIN — HYDROCODONE BITARTRATE AND ACETAMINOPHEN 1 TABLET: 10; 325 TABLET ORAL at 06:05

## 2024-05-20 RX ADMIN — ONDANSETRON 4 MG: 4 TABLET, ORALLY DISINTEGRATING ORAL at 06:05

## 2024-05-20 NOTE — ED PROVIDER NOTES
Encounter Date: 2024       History     Chief Complaint   Patient presents with    Leg Pain    Back Pain     Pt to er with right leg pain/back pain. Pt had fall on yesterday. Pt had hip replacement on      54-year-old male with history of right total hip arthroplasty by Dr. Gonzales on 2024 complains of pain to his low back, right hip, right knee after losing his balance last night and falling directly on his buttocks.  He is ambulatory with a cane on arrival.  He has no weakness or numbness.  He states he has a lot of pain in the incision but that it is well healed.  No other injuries.      On 2024 states that he lost his balance yesterday and fell directly on his buttocks last night.  He complains of pain to his low back, right hip, right knee.  He is ambulatory with a cane arrival.  Review of patient's allergies indicates:   Allergen Reactions    Corticosteroids (glucocorticoids) Other (See Comments)     Makes his sugar levels increase drastically.    Meperidine Itching and Nausea And Vomiting     Past Medical History:   Diagnosis Date    Coronary artery disease     Depression     Diabetes mellitus, type 2     Hypertension     Primary osteoarthritis of right hip 2024    Schizophrenia, unspecified     Sleep apnea, unspecified     cpap     Past Surgical History:   Procedure Laterality Date    APPENDECTOMY      arm fracture surgery Left     cardiac stents      2x    ORIF FACIAL FRACTURE Left     ROBOTIC ARTHROPLASTY, HIP Right 2024    Procedure: ROBOTIC ARTHROPLASTY,HIP;  Surgeon: Ke Joe MD;  Location: [facility];  Service: Orthopedics;  Laterality: Right;  Rk    SINUS SURGERY       Family History   Family history unknown: Yes     Social History     Tobacco Use    Smoking status: Former     Average packs/day: 2.0 packs/day for 30.0 years (60.0 ttl pk-yrs)     Types: Cigarettes     Start date:      Quit date:      Years since quittin.4    Smokeless tobacco: Never     Tobacco comments:     Quit 2 years ago    Substance Use Topics    Alcohol use: Not Currently     Comment: Quit 5 years ago    Drug use: Not Currently     Comment: Quit 5 years ago.     Review of Systems   Musculoskeletal:  Positive for arthralgias and back pain.   All other systems reviewed and are negative.      Physical Exam     Initial Vitals [05/20/24 0543]   BP Pulse Resp Temp SpO2   (!) 164/90 64 20 98.7 °F (37.1 °C) 98 %      MAP       --         Physical Exam    Nursing note and vitals reviewed.  Constitutional: Vital signs are normal. He appears well-developed and well-nourished.   HENT:   Head: Normocephalic and atraumatic.   Mouth/Throat: Oropharynx is clear and moist.   Eyes: Pupils are equal, round, and reactive to light.   Neck: Neck supple. No JVD present.   Cardiovascular:  Normal rate, regular rhythm and normal heart sounds.           Pulmonary/Chest: Breath sounds normal. No respiratory distress.   Abdominal: Abdomen is soft. There is no abdominal tenderness.   Musculoskeletal:         General: No edema.      Cervical back: Neck supple. No edema or erythema.      Comments: Ambulatory with a cane, no tenderness to the low back, right hip incision is well healed, with mild diffuse tenderness to the right hip and lateral thigh.  Right knee has full range of motion but complains of pain to the heat knee and hip with movement.  No joint effusion.  No crepitus.  Normal motor and sensation to the leg.     Lymphadenopathy:     He has no cervical adenopathy.   Neurological: He is alert and oriented to person, place, and time. No cranial nerve deficit. GCS score is 15. GCS eye subscore is 4. GCS verbal subscore is 5. GCS motor subscore is 6.   Skin: Skin is warm and dry. Capillary refill takes less than 2 seconds.         ED Course   Procedures  Labs Reviewed - No data to display       Imaging Results              X-Ray Lumbar Spine Ap And Lateral (Final result)  Result time 05/20/24 11:04:41      Final  result by Sudeep Miller MD (05/20/24 11:04:41)                   Narrative:    EXAMINATION  XR LUMBAR SPINE AP AND LATERAL    CLINICAL HISTORY  fall;    TECHNIQUE  A total of 3 images submitted of the lumbosacral spine.    COMPARISON  10 April 2023    FINDINGS  Vertebral segments: No cortical displacement, acute compression deformity, or focal lesion. Degenerative endplate and facet changes are again appreciated through the lower lumbar levels.  No evidence of traumatic subluxation.    Curvature: Normal curvature is maintained.    Disc spaces: Multilevel intervertebral space narrowing is present, chronic and degenerative in appearance.    Other findings: The partially visualized pelvic joint spaces are congruent and no focal irregularity of the bony pelvis is identified.  No acute or focal soft tissue abnormality.    IMPRESSION  Similar degenerative changes.  No convincing acute abnormality.      Electronically signed by: Sudeep Miller  Date:    05/20/2024  Time:    11:04                                     X-Ray Knee Complete 4 Or More Views Right (Final result)  Result time 05/20/24 06:46:32      Final result by Ariel Nunez MD (05/20/24 06:46:32)                   Impression:      No acute abnormality of the knee.      Electronically signed by: Ariel Nunez  Date:    05/20/2024  Time:    06:46               Narrative:    EXAMINATION:  XR KNEE COMP 4 OR MORE VIEWS RIGHT    CLINICAL HISTORY:  Unspecified fall, initial encounter    TECHNIQUE:  Four views of the right knee.    COMPARISON:  No prior imaging available for comparison.    FINDINGS:  No acute fracture appreciated.    Mild degenerative changes with patellar osteophytes.    The soft tissues are grossly unremarkable.    No radiopaque foreign body.                                       X-Ray Hip 2 or 3 views Right (with Pelvis when performed) (Final result)  Result time 05/20/24 06:48:51      Final result by Ariel Nunez MD (05/20/24 06:48:51)                    Impression:      As above.      Electronically signed by: Ariel Nunez  Date:    05/20/2024  Time:    06:48               Narrative:    EXAMINATION:  XR HIP WITH PELVIS WHEN PERFORMED, 2 OR 3  VIEWS RIGHT    CLINICAL HISTORY:  Unspecified fall, initial encounter    TECHNIQUE:  Single view of the pelvis with two views of the right hip.    COMPARISON:  05/08/2024    FINDINGS:  The sacroiliac joints are symmetric.  The pubic symphysis is congruent.  Postsurgical changes of right total hip with no evidence of acute hardware failure.                                       Medications   morphine injection 4 mg (4 mg Intramuscular Given 5/20/24 0606)   ondansetron disintegrating tablet 4 mg (4 mg Oral Given 5/20/24 0615)   HYDROcodone-acetaminophen  mg per tablet 1 tablet (1 tablet Oral Given 5/20/24 0644)     Medical Decision Making  Amount and/or Complexity of Data Reviewed  Radiology: ordered.    Risk  Prescription drug management.                                      Clinical Impression:  Final diagnoses:  [W19.XXXA] Fall  [G89.18, M25.551] Acute postoperative pain of right hip (Primary)  [M51.9] Lumbar disc disease  [M25.561] Acute pain of right knee          ED Disposition Condition    Discharge Stable          ED Prescriptions    None       Follow-up Information       Follow up With Specialties Details Why Contact Info    Cali Gandara MD Neurosurgery Schedule an appointment as soon as possible for a visit   [address]   [address]  [facility] [address]  [phone]      Ke Joe MD Orthopedic Surgery   [address]  [address]  [facility] [address]  [phone]               Luzmaria Xavier MD  05/20/24 4750

## 2024-05-21 DIAGNOSIS — Z96.641 STATUS POST RIGHT HIP REPLACEMENT: ICD-10-CM

## 2024-05-22 RX ORDER — HYDROCODONE BITARTRATE AND ACETAMINOPHEN 10; 325 MG/1; MG/1
1 TABLET ORAL 3 TIMES DAILY
Qty: 21 TABLET | Refills: 0 | Status: SHIPPED | OUTPATIENT
Start: 2024-05-22 | End: 2024-05-29

## 2024-08-12 ENCOUNTER — HOSPITAL ENCOUNTER (OUTPATIENT)
Dept: RADIOLOGY | Facility: CLINIC | Age: 54
Discharge: HOME | End: 2024-08-12
Attending: SPECIALIST
Payer: MEDICARE

## 2024-08-12 ENCOUNTER — OFFICE VISIT (OUTPATIENT)
Dept: ORTHOPEDICS | Facility: CLINIC | Age: 54
End: 2024-08-12
Payer: MEDICARE

## 2024-08-12 VITALS
BODY MASS INDEX: 35.79 KG/M2 | WEIGHT: 270.06 LBS | HEART RATE: 93 BPM | HEIGHT: 73 IN | SYSTOLIC BLOOD PRESSURE: 153 MMHG | DIASTOLIC BLOOD PRESSURE: 97 MMHG

## 2024-08-12 DIAGNOSIS — Z96.641 STATUS POST RIGHT HIP REPLACEMENT: ICD-10-CM

## 2024-08-12 DIAGNOSIS — M51.36 DDD (DEGENERATIVE DISC DISEASE), LUMBAR: ICD-10-CM

## 2024-08-12 DIAGNOSIS — M16.11 PRIMARY OSTEOARTHRITIS OF RIGHT HIP: ICD-10-CM

## 2024-08-12 DIAGNOSIS — I25.10 CORONARY ARTERY DISEASE, UNSPECIFIED VESSEL OR LESION TYPE, UNSPECIFIED WHETHER ANGINA PRESENT, UNSPECIFIED WHETHER NATIVE OR TRANSPLANTED HEART: ICD-10-CM

## 2024-08-12 DIAGNOSIS — M51.36 DDD (DEGENERATIVE DISC DISEASE), LUMBAR: Primary | ICD-10-CM

## 2024-08-12 DIAGNOSIS — E11.9 DIABETES MELLITUS WITHOUT COMPLICATION: ICD-10-CM

## 2024-08-12 PROCEDURE — 3080F DIAST BP >= 90 MM HG: CPT | Mod: CPTII,,, | Performed by: SPECIALIST

## 2024-08-12 PROCEDURE — 3077F SYST BP >= 140 MM HG: CPT | Mod: CPTII,,, | Performed by: SPECIALIST

## 2024-08-12 PROCEDURE — 72100 X-RAY EXAM L-S SPINE 2/3 VWS: CPT | Mod: ,,, | Performed by: SPECIALIST

## 2024-08-12 PROCEDURE — 1160F RVW MEDS BY RX/DR IN RCRD: CPT | Mod: CPTII,,, | Performed by: SPECIALIST

## 2024-08-12 PROCEDURE — 1159F MED LIST DOCD IN RCRD: CPT | Mod: CPTII,,, | Performed by: SPECIALIST

## 2024-08-12 PROCEDURE — 4010F ACE/ARB THERAPY RXD/TAKEN: CPT | Mod: CPTII,,, | Performed by: SPECIALIST

## 2024-08-12 PROCEDURE — 3044F HG A1C LEVEL LT 7.0%: CPT | Mod: CPTII,,, | Performed by: SPECIALIST

## 2024-08-12 PROCEDURE — 3008F BODY MASS INDEX DOCD: CPT | Mod: CPTII,,, | Performed by: SPECIALIST

## 2024-08-12 PROCEDURE — 99213 OFFICE O/P EST LOW 20 MIN: CPT | Mod: ,,, | Performed by: SPECIALIST

## 2024-08-12 PROCEDURE — 73502 X-RAY EXAM HIP UNI 2-3 VIEWS: CPT | Mod: RT,,, | Performed by: SPECIALIST

## 2024-08-12 NOTE — PROGRESS NOTES
"Past Medical History:   Diagnosis Date    Coronary artery disease     Depression     Diabetes mellitus, type 2     Hypertension     Primary osteoarthritis of right hip 04/09/2024    Schizophrenia, unspecified     Sleep apnea, unspecified     cpap       Past Surgical History:   Procedure Laterality Date    APPENDECTOMY      arm fracture surgery Left     cardiac stents      2x    ORIF FACIAL FRACTURE Left     ROBOTIC ARTHROPLASTY, HIP Right 4/9/2024    Procedure: ROBOTIC ARTHROPLASTY,HIP;  Surgeon: Ke Joe MD;  Location: [facility];  Service: Orthopedics;  Laterality: Right;  Rk    SINUS SURGERY         Current Outpatient Medications   Medication Sig    aspirin (ECOTRIN) 81 MG EC tablet Take 1 tablet (81 mg total) by mouth every morning. Increase to twice a day for 6 weeks post-op for blood clot prevention.    brexpiprazole (REXULTI) 0.25 mg Tab Take 0.25 mg by mouth every evening.    busPIRone (BUSPAR) 15 MG tablet Take 15 mg by mouth 3 (three) times daily.    carvediloL (COREG) 6.25 MG tablet Take 12.5 mg by mouth 2 (two) times daily. Pt takes 12.5mg    daridorexant (QUVIVIQ) 25 mg Tab Take 25 mg by mouth every evening.    FLUoxetine 20 MG capsule Take 40 mg by mouth once daily.    FLUoxetine 20 MG capsule Take 20 mg by mouth once daily. Takes 40mg in AM and 20mg at noon    fluticasone propionate (FLONASE) 50 mcg/actuation nasal spray 1 spray by Each Nostril route.    gabapentin (NEURONTIN) 600 MG tablet Take 600 mg by mouth 4 (four) times daily.    hydrOXYzine pamoate (VISTARIL) 50 MG Cap Take 100 mg by mouth nightly as needed (Insomnia).    levocetirizine (XYZAL) 5 MG tablet Take 5 mg by mouth every evening.    lisinopriL (PRINIVIL,ZESTRIL) 40 MG tablet Take 40 mg by mouth once daily.    MOUNJARO 5 mg/0.5 mL PnIj Inject 5 mg into the skin every 7 days.    pen needle, diabetic 31 gauge x 5/16" Ndle USE THREE TIMES WITH NOVOLOG INSULIN AND DAILY WITH BASAGLAR    rosuvastatin (CRESTOR) 20 MG tablet Take 1 " tablet by mouth every morning.    diclofenac sodium (VOLTAREN) 1 % Gel Apply 2 g topically 4 (four) times daily as needed (pain). Do not exceed 32 grams/day: do not to exceed 8 grams/day/single joint of upper extremities; do not to exceed 16 grams/day/single joint of lower extremities.  Please request refill of this medication from your PCP.     No current facility-administered medications for this visit.       Review of patient's allergies indicates:   Allergen Reactions    Corticosteroids (glucocorticoids) Other (See Comments)     Makes his sugar levels increase drastically.    Meperidine Itching and Nausea And Vomiting       Family History   Family history unknown: Yes       Social History     Socioeconomic History    Marital status:    Tobacco Use    Smoking status: Former     Average packs/day: 2.0 packs/day for 30.0 years (60.0 ttl pk-yrs)     Types: Cigarettes     Start date:      Quit date:      Years since quittin.6    Smokeless tobacco: Never    Tobacco comments:     Quit 2 years ago    Substance and Sexual Activity    Alcohol use: Not Currently     Comment: Quit 5 years ago    Drug use: Not Currently     Comment: Quit 5 years ago.    Sexual activity: Not Currently     Social Determinants of Health     Financial Resource Strain: Unknown (2019)    Received from [employer] [facility] and Its Subsidiaries and Affiliates, [employer] [facility] and Its Subsidiaries and Affiliates    Overall Financial Resource Strain (CARDIA)     Difficulty of Paying Living Expenses: Patient declined   Food Insecurity: Unknown (2019)    Received from [employer] [facility] and Its Subsidiaries and Affiliates, [employer] [facility] and Its Subsidiaries and Affiliates    Hunger Vital Sign     Worried About Running Out of Food in the Last Year: Patient declined     Ran Out of Food in the Last  Year: Patient declined   Transportation Needs: Unknown (9/12/2019)    [facility], [facility] and Its [facility] and Affiliates    PRAPARE - Transportation     Lack of Transportation (Medical): Patient declined     Lack of Transportation (Non-Medical): Patient declined   Physical Activity: Unknown (9/12/2019)    Received from [facility] and Its [facility] and Affiliates, [facility] and Its [facility] and Affiliates    Exercise Vital Sign     Days of Exercise per Week: Patient declined     Minutes of Exercise per Session: Patient declined   Stress: Stress Concern Present (9/12/2019)    Received from [facility] and Its [facility] and Affiliates, [facility] and Its [facility] and Affiliates    [facility] [employer] of Occupational Health - Occupational Stress Questionnaire     Feeling of Stress : To some extent       Chief Complaint:   Chief Complaint   Patient presents with    Right Hip - Follow-up     Rt TAMIKO 4/9/24 gl 7/8/24, pt states still having pain in the front of thigh, states constant sharp pain, states has been going on since sx, now radiating to left side as well, ambulates with cane, wbat,        Consulting Physician: No ref. provider found    History of present illness:    This is a 54 y.o. year old male who complains of pain in the right buttock that radiates down the posterior thigh and calf into the toes.  Has an appointment in 2 weeks with Dr. Gandara for his lumbar spine.  No right groin pain.  Still has some weakness 4 months postop in the right lower extremity after total hip arthroplasty.    Review of Systems:    Constitution:   Denies chills, fever, and sweats.  HENT:   Denies headaches or blurry  "vision.  Cardiovascular:  Denies chest pain or irregular heart beat.  Respiratory:   Denies cough or shortness of breath.  Gastrointestinal:  Denies abdominal pain, nausea, or vomiting.  Musculoskeletal:   Denies muscle cramps.  Neurological:   Denies dizziness or focal weakness.  Psychiatric/Behavior: Normal mental status.  Hematology/Lymph:  Denies bleeding problem or easy bruising/bleeding.  Skin:    Denies rash or suspicious lesions.    Examination:    Vital Signs:    Vitals:    08/12/24 0837   BP: (!) 153/97   Pulse: 93   Weight: 122.5 kg (270 lb 1 oz)   Height: 6' 1" (1.854 m)       Body mass index is 35.63 kg/m².    Constitution:   Well-developed, well nourished patient in no acute distress.  Neurological:   Alert and oriented x 3 and cooperative to examination.     Psychiatric/Behavior: Normal mental status.  Respiratory:   No shortness of breath.non labored breathing.  Cardiovascular: Regular rate and rhythm  Eyes:    Extraoccular muscles intact  Skin:    No scars, rash or suspicious lesions.    Physical Exam:  Right Hip     No swelling, no erythema, no increased heat  No atrophy  No abductor weakness  Normal gait  Palpable pulses  Normal sensation and motor function    Well healed scar    No tenderness or instability of the hips was noted. Motion was normal. No weakness observed.    Imaging: X-rays ordered and images interpreted today personally by me of AP pelvis and AP and lateral of the right hip which show pristine interfaces and stable well-aligned right total hip arthroplasty.  Mild degenerative changes left hip.  Impression: Stable well-aligned right total hip arthroplasty.    X-rays ordered and images interpreted today personally by me of AP and lateral radiographs of the lumbar spine which show disc space narrowing at L4-5 and mild facet hypertrophy.  Patient is bone-on-bone with advanced degenerative disc disease at L5-S1 and advanced lumbar facet arthropathy at L5-S1 as well.  Impression:  As " above.         Assessment: DDD (degenerative disc disease), lumbar  -     X-Ray Lumbar Spine 2 Or 3 Views; Future; Expected date: 08/12/2024  -     X-Ray Hip 2 or 3 views Right with Pelvis when performed; Future; Expected date: 08/12/2024    Primary osteoarthritis of right hip  -     X-Ray Lumbar Spine 2 Or 3 Views; Future; Expected date: 08/12/2024  -     X-Ray Hip 2 or 3 views Right with Pelvis when performed; Future; Expected date: 08/12/2024    Status post right hip replacement    Diabetes mellitus without complication    Coronary artery disease, unspecified vessel or lesion type, unspecified whether angina present, unspecified whether native or transplanted heart        Plan:  Patient's symptoms are related to his lumbar pathology and he has an appointment in 2 weeks with Dr. Gandara with neurosurgery.  He will follow up with me for 1 year checkup postop from right total hip arthroplasty.      DISCLAIMER: This note may have been dictated using voice recognition software and may contain grammatical errors.     NOTE: Consult report sent to referring provider via [employer].

## 2024-08-13 ENCOUNTER — OFFICE VISIT (OUTPATIENT)
Dept: UROLOGY | Facility: CLINIC | Age: 54
End: 2024-08-13
Payer: MEDICARE

## 2024-08-13 VITALS
HEART RATE: 98 BPM | SYSTOLIC BLOOD PRESSURE: 121 MMHG | WEIGHT: 275 LBS | OXYGEN SATURATION: 95 % | TEMPERATURE: 98 F | BODY MASS INDEX: 37.25 KG/M2 | HEIGHT: 72 IN | RESPIRATION RATE: 20 BRPM | DIASTOLIC BLOOD PRESSURE: 84 MMHG

## 2024-08-13 DIAGNOSIS — R35.1 NOCTURIA: Primary | ICD-10-CM

## 2024-08-13 DIAGNOSIS — Z12.5 SCREENING PSA (PROSTATE SPECIFIC ANTIGEN): ICD-10-CM

## 2024-08-13 LAB — POC RESIDUAL URINE VOLUME: 0 ML (ref 0–100)

## 2024-08-13 PROCEDURE — 99213 OFFICE O/P EST LOW 20 MIN: CPT | Mod: PBBFAC | Performed by: NURSE PRACTITIONER

## 2024-08-13 PROCEDURE — 51798 US URINE CAPACITY MEASURE: CPT | Mod: PBBFAC | Performed by: NURSE PRACTITIONER

## 2024-08-13 RX ORDER — MIRABEGRON 50 MG/1
50 TABLET, EXTENDED RELEASE ORAL DAILY
Qty: 90 TABLET | Refills: 3 | Status: SHIPPED | OUTPATIENT
Start: 2024-08-13 | End: 2025-08-13

## 2024-08-13 NOTE — PROGRESS NOTES
Placed in room. Seen by Eze Matias Np. Spoke with patient. Bladder scan at 0 ml.  PSA today. RTC in 1 month.     Clothing

## 2024-08-13 NOTE — PROGRESS NOTES
Chief Complaint:   Chief Complaint   Patient presents with    Nocturia       HPI:   Patient is a 54-year-old male presenting with history of nocturia.  Patient seen by PCP on 12/20/2024 with complaints of nocturia.  Today patient presents with urinary frequency 5-7 times during the day and 8-10 times at night.  Patient has a history of diabetes and does not complain of any thirst he is currently on 105 mg injections weekly.  Instructed patient to get PSA done within a week and will notify him results when completed.  Bladder scan 0 mL  Allergies:  Review of patient's allergies indicates:   Allergen Reactions    Corticosteroids (glucocorticoids) Other (See Comments)     Makes his sugar levels increase drastically.    Meperidine Itching and Nausea And Vomiting       Medications:  Current Outpatient Medications   Medication Sig Dispense Refill    aspirin (ECOTRIN) 81 MG EC tablet Take 1 tablet (81 mg total) by mouth every morning. Increase to twice a day for 6 weeks post-op for blood clot prevention.  0    brexpiprazole (REXULTI) 0.25 mg Tab Take 0.25 mg by mouth every evening.      busPIRone (BUSPAR) 15 MG tablet Take 15 mg by mouth 3 (three) times daily.      carvediloL (COREG) 6.25 MG tablet Take 12.5 mg by mouth 2 (two) times daily. Pt takes 12.5mg      daridorexant (QUVIVIQ) 25 mg Tab Take 25 mg by mouth every evening.      diclofenac sodium (VOLTAREN) 1 % Gel Apply 2 g topically 4 (four) times daily as needed (pain). Do not exceed 32 grams/day: do not to exceed 8 grams/day/single joint of upper extremities; do not to exceed 16 grams/day/single joint of lower extremities.  Please request refill of this medication from your PCP. 100 g 3    FLUoxetine 20 MG capsule Take 40 mg by mouth once daily.      FLUoxetine 20 MG capsule Take 20 mg by mouth once daily. Takes 40mg in AM and 20mg at noon      fluticasone propionate (FLONASE) 50 mcg/actuation nasal spray 1 spray by Each Nostril route.      gabapentin (NEURONTIN)  "600 MG tablet Take 600 mg by mouth 4 (four) times daily.      hydrOXYzine pamoate (VISTARIL) 50 MG Cap Take 100 mg by mouth nightly as needed (Insomnia).      levocetirizine (XYZAL) 5 MG tablet Take 5 mg by mouth every evening.      lisinopriL (PRINIVIL,ZESTRIL) 40 MG tablet Take 40 mg by mouth once daily.      MOUNJARO 5 mg/0.5 mL PnIj Inject 5 mg into the skin every 7 days.      pen needle, diabetic 31 gauge x 5/16" Ndle USE THREE TIMES WITH NOVOLOG INSULIN AND DAILY WITH BASAGLAR      rosuvastatin (CRESTOR) 20 MG tablet Take 1 tablet by mouth every morning.       No current facility-administered medications for this visit.       Review of Systems:  General: No fever, chills, fatigability, or weight loss.  Skin: No rashes, itching, or changes in color or texture of skin.  Chest: Denies SMITH, cyanosis, wheezing, cough, and sputum production.  Abdomen: Appetite fine. No weight loss. Denies diarrhea, abdominal pain, hematemesis, or blood in stool.  Musculoskeletal: No joint stiffness or swelling. Denies back pain.  : As above.  All other review of systems negative.    PMH:  Past Medical History:   Diagnosis Date    Coronary artery disease     Depression     Diabetes mellitus, type 2     Hypertension     Primary osteoarthritis of right hip 04/09/2024    Schizophrenia, unspecified     Sleep apnea, unspecified     cpap       PSH:  Past Surgical History:   Procedure Laterality Date    APPENDECTOMY      arm fracture surgery Left     cardiac stents      2x    ORIF FACIAL FRACTURE Left     ROBOTIC ARTHROPLASTY, HIP Right 4/9/2024    Procedure: ROBOTIC ARTHROPLASTY,HIP;  Surgeon: Ke Joe MD;  Location: [facility];  Service: Orthopedics;  Laterality: Right;  Rk    SINUS SURGERY         FamHx:  Family History   Family history unknown: Yes       SocHx:  Social History     Socioeconomic History    Marital status:    Tobacco Use    Smoking status: Former     Average packs/day: 2.0 packs/day for 30.0 years (60.0 ttl " pk-yrs)     Types: Cigarettes     Start date:      Quit date:      Years since quittin.6    Smokeless tobacco: Never    Tobacco comments:     Quit 2 years ago    Substance and Sexual Activity    Alcohol use: Not Currently     Comment: Quit 5 years ago    Drug use: Not Currently     Comment: Quit 5 years ago.    Sexual activity: Not Currently     Social Determinants of Health     Financial Resource Strain: Unknown (2019)    [facility], [facility] and Its [facility] and Affiliates    Overall Financial Resource Strain (CARDIA)     Difficulty of Paying Living Expenses: Patient declined   Food Insecurity: Unknown (2019)    [facility], [facility]    Hunger Vital Sign     Worried About Running Out of Food in the Last Year: Patient declined     Ran Out of Food in the Last Year: Patient declined   Transportation Needs: Unknown (2019)    [facility], [facility]    PRAPARE - Transportation     Lack of Transportation (Medical): Patient declined     Lack of Transportation (Non-Medical): Patient declined   Physical Activity: Unknown (2019)    Received from [facility] and Its Subsidiaries and Affiliates, [facility]    Exercise Vital Sign     Days of Exercise per Week: Patient declined     Minutes of Exercise per Session: Patient declined   Stress: Stress Concern Present (2019)    [facility] from [facility]  [facility] and Its Subsidiaries and Affiliates, [facility] and Its Subsidiaries and Affiliates    [employer] of Occupational Health - Occupational Stress Questionnaire     Feeling of Stress : To some extent       Physical Exam:  Vitals:    08/13/24 1013   BP: 121/84   Pulse: 98   Resp: 20   Temp: 98.2 °F (36.8 °C)     General: A&Ox3, no apparent distress, no deformities  Neck: No masses, normal thyroid  Lungs: CTA clayton, no use of accessory muscles  Heart: RRR, no arrhythmias  Abdomen: Soft, NT, ND, no masses, no hernias, no hepatosplenomegaly  Lymphatic: Neck and groin nodes negative  Skin: The skin is warm and dry. No jaundice.  Ext: No c/c/e.    [place]:  Deferred YOGI at this time we will perform YOGI on next appointment.    Labs:    Latest Reference Range & Units 03/25/21 10:10 05/17/23 09:51   PSA Total 0.0 - 4.0 ng/mL 0.9 (E) 0.3 (E)   (E): External lab result      Impression:  1. Nocturia  - POCT Bladder Scan  2. Screening PSA  3. Overactive bladder    Plan:  Instructed patient to start Myrbetriq 50 mg p.o. daily.    Instructed patient on timed voiding every 2-3 hrs, double voiding, avoidance of bladder irritants such as alcohol, citrus foods, chocolate, caffeinated drinks, energy drinks, spicy foods, sugar, caffeine products, sodas. Instructed patient to avoid drinking fluids 1-2 hours prior to bedtime & void immediately before bedtime.   RTC one-month for re-evaluation.  Instructed patient if develops any abnormal urologic symptoms notify clinic to be re-evaluate treated or during after hours go to emergency room versus urgent here.                           [facility]

## 2024-08-15 ENCOUNTER — LAB VISIT (OUTPATIENT)
Dept: LAB | Facility: HOSPITAL | Age: 54
End: 2024-08-15
Attending: NURSE PRACTITIONER
Payer: MEDICARE

## 2024-08-15 DIAGNOSIS — Z12.5 SCREENING PSA (PROSTATE SPECIFIC ANTIGEN): ICD-10-CM

## 2024-08-15 LAB — PSA SERPL-MCNC: 0.31 NG/ML

## 2024-08-15 PROCEDURE — 84153 ASSAY OF PSA TOTAL: CPT

## 2024-08-15 PROCEDURE — 36415 COLL VENOUS BLD VENIPUNCTURE: CPT

## 2024-09-10 ENCOUNTER — OFFICE VISIT (OUTPATIENT)
Dept: UROLOGY | Facility: CLINIC | Age: 54
End: 2024-09-10
Payer: MEDICARE

## 2024-09-10 VITALS
BODY MASS INDEX: 37.49 KG/M2 | RESPIRATION RATE: 20 BRPM | TEMPERATURE: 98 F | SYSTOLIC BLOOD PRESSURE: 109 MMHG | OXYGEN SATURATION: 95 % | WEIGHT: 276.81 LBS | HEART RATE: 97 BPM | DIASTOLIC BLOOD PRESSURE: 76 MMHG | HEIGHT: 72 IN

## 2024-09-10 DIAGNOSIS — R35.1 NOCTURIA: ICD-10-CM

## 2024-09-10 LAB — POC RESIDUAL URINE VOLUME: 20 ML (ref 0–100)

## 2024-09-10 PROCEDURE — 3044F HG A1C LEVEL LT 7.0%: CPT | Mod: CPTII,,, | Performed by: NURSE PRACTITIONER

## 2024-09-10 PROCEDURE — 3074F SYST BP LT 130 MM HG: CPT | Mod: CPTII,,, | Performed by: NURSE PRACTITIONER

## 2024-09-10 PROCEDURE — 3078F DIAST BP <80 MM HG: CPT | Mod: CPTII,,, | Performed by: NURSE PRACTITIONER

## 2024-09-10 PROCEDURE — 99215 OFFICE O/P EST HI 40 MIN: CPT | Mod: PBBFAC | Performed by: NURSE PRACTITIONER

## 2024-09-10 PROCEDURE — 4010F ACE/ARB THERAPY RXD/TAKEN: CPT | Mod: CPTII,,, | Performed by: NURSE PRACTITIONER

## 2024-09-10 PROCEDURE — 1159F MED LIST DOCD IN RCRD: CPT | Mod: CPTII,,, | Performed by: NURSE PRACTITIONER

## 2024-09-10 PROCEDURE — 3008F BODY MASS INDEX DOCD: CPT | Mod: CPTII,,, | Performed by: NURSE PRACTITIONER

## 2024-09-10 PROCEDURE — 51798 US URINE CAPACITY MEASURE: CPT | Mod: PBBFAC | Performed by: NURSE PRACTITIONER

## 2024-09-10 PROCEDURE — 99213 OFFICE O/P EST LOW 20 MIN: CPT | Mod: S$PBB,,, | Performed by: NURSE PRACTITIONER

## 2024-09-10 RX ORDER — VIBEGRON 75 MG/1
75 TABLET, FILM COATED ORAL DAILY
Qty: 30 TABLET | Refills: 11 | Status: SHIPPED | OUTPATIENT
Start: 2024-09-10

## 2024-09-10 NOTE — PROGRESS NOTES
Placed in room. Seen by Eze Matias NP. Spoke with patient. Bladder scan at 20 ml. RTC in 3 months.

## 2024-09-10 NOTE — PROGRESS NOTES
Chief Complaint: No chief complaint on file.      HPI:   Patient is a 54-year-old male here for one-month follow-up with history of nocturia.  Patient seen by PCP on 12/20/2024 with complaints of nocturia.  On patient's last visit presented with urinary frequency 5-7 times during the day and 8-10 times at night.  Therefore patient was started on mirabegron 50 mg p.o. daily.  Patient has a history of diabetes and does not complain of any thirst he is currently on Mounjaro 5 mg injections weekly.  Patient's previous PSA 0.31.  Bladder scan 20 mL.  Today patient presenting with persistent nocturia 8-10 times per night mirabegron was not effective.  Allergies:  Review of patient's allergies indicates:   Allergen Reactions    Corticosteroids (glucocorticoids) Other (See Comments)     Makes his sugar levels increase drastically.    Meperidine Itching and Nausea And Vomiting       Medications:  Current Outpatient Medications   Medication Sig Dispense Refill    aspirin (ECOTRIN) 81 MG EC tablet Take 1 tablet (81 mg total) by mouth every morning. Increase to twice a day for 6 weeks post-op for blood clot prevention.  0    brexpiprazole (REXULTI) 0.25 mg Tab Take 0.25 mg by mouth every evening.      busPIRone (BUSPAR) 15 MG tablet Take 15 mg by mouth 3 (three) times daily.      carvediloL (COREG) 6.25 MG tablet Take 12.5 mg by mouth 2 (two) times daily. Pt takes 12.5mg      daridorexant (QUVIVIQ) 25 mg Tab Take 25 mg by mouth every evening.      diclofenac sodium (VOLTAREN) 1 % Gel Apply 2 g topically 4 (four) times daily as needed (pain). Do not exceed 32 grams/day: do not to exceed 8 grams/day/single joint of upper extremities; do not to exceed 16 grams/day/single joint of lower extremities.  Please request refill of this medication from your PCP. 100 g 3    FLUoxetine 20 MG capsule Take 40 mg by mouth once daily.      FLUoxetine 20 MG capsule Take 20 mg by mouth once daily. Takes 40mg in AM and 20mg at noon       "fluticasone propionate (FLONASE) 50 mcg/actuation nasal spray 1 spray by Each Nostril route.      gabapentin (NEURONTIN) 600 MG tablet Take 600 mg by mouth 4 (four) times daily.      hydrOXYzine pamoate (VISTARIL) 50 MG Cap Take 100 mg by mouth nightly as needed (Insomnia).      levocetirizine (XYZAL) 5 MG tablet Take 5 mg by mouth every evening.      lisinopriL (PRINIVIL,ZESTRIL) 40 MG tablet Take 40 mg by mouth once daily.      mirabegron (MYRBETRIQ) 50 mg Tb24 Take 1 tablet (50 mg total) by mouth once daily. 90 tablet 3    MOUNJARO 5 mg/0.5 mL PnIj Inject 5 mg into the skin every 7 days.      pen needle, diabetic 31 gauge x 5/16" Ndle USE THREE TIMES WITH NOVOLOG INSULIN AND DAILY WITH BASAGLAR      rosuvastatin (CRESTOR) 20 MG tablet Take 1 tablet by mouth every morning.       No current facility-administered medications for this visit.       Review of Systems:  General: No fever, chills, fatigability, or weight loss.  Skin: No rashes, itching, or changes in color or texture of skin.  Chest: Denies SMITH, cyanosis, wheezing, cough, and sputum production.  Abdomen: Appetite fine. No weight loss. Denies diarrhea, abdominal pain, hematemesis, or blood in stool.  Musculoskeletal: No joint stiffness or swelling. Denies back pain.  : As above.  All other review of systems negative.    PMH:  Past Medical History:   Diagnosis Date    Coronary artery disease     Depression     Diabetes mellitus, type 2     Hypertension     Primary osteoarthritis of right hip 04/09/2024    Schizophrenia, unspecified     Sleep apnea, unspecified     cpap       PSH:  Past Surgical History:   Procedure Laterality Date    APPENDECTOMY      arm fracture surgery Left     cardiac stents      2x    ORIF FACIAL FRACTURE Left     ROBOTIC ARTHROPLASTY, HIP Right 4/9/2024    Procedure: ROBOTIC ARTHROPLASTY,HIP;  Surgeon: Ke Joe MD;  Location: [facility];  Service: Orthopedics;  Laterality: Right;  Rk    SINUS SURGERY         FamHx:  Family " History   Family history unknown: Yes       SocHx:  Social History     Socioeconomic History    Marital status:    Tobacco Use    Smoking status: Former     Average packs/day: 2.0 packs/day for 30.0 years (60.0 ttl pk-yrs)     Types: Cigarettes     Start date:      Quit date:      Years since quittin.7    Smokeless tobacco: Never    Tobacco comments:     Quit 2 years ago    Substance and Sexual Activity    Alcohol use: Not Currently     Comment: Quit 5 years ago    Drug use: Not Currently     Comment: Quit 5 years ago.    Sexual activity: Not Currently     Social Determinants of Health     Financial Resource Strain: Unknown (2019)    [facility] and Its Subsidiaries and Affiliates, [employer] [facility] and Its Subsidiaries and Affiliates    Overall Financial Resource Strain (CARDIA)     Difficulty of Paying Living Expenses: Patient declined   Food Insecurity: Unknown (2019)    Received from [facility] and Its Subsidiaries and Affiliates, [employer] [facility] and Its Subsidiaries and Affiliates    Hunger Vital Sign     Worried About Running Out of Food in the Last Year: Patient declined     Ran Out of Food in the Last Year: Patient declined   Transportation Needs: Unknown (2019)    [facility] and [facility] Subsidiaries and Affiliates, [employer] [facility] and Its Subsidiaries and Affiliates    PRAPARE - Transportation     Lack of Transportation (Medical): Patient declined     Lack of Transportation (Non-Medical): Patient declined   Physical Activity: Unknown (2019)    [facility] and Its Subsidiaries and Affiliates, [employer] [facility]     Exercise Vital Sign     Days of Exercise per Week: Patient declined     Minutes of Exercise per Session: Patient declined   Stress: Stress Concern Present (9/12/2019)    [facility] and Its Subsidiaries and Affiliates, [facility] and Its Subsidiaries and Affiliates    [facility] of Occupational Health - Occupational Stress Questionnaire     Feeling of Stress : To some extent       Physical Exam:  There were no vitals filed for this visit.  General: A&Ox3, no apparent distress, no deformities  Neck: No masses, normal thyroid  Lungs: CTA clayton, no use of accessory muscles  Heart: RRR, no arrhythmias  Abdomen: Soft, NT, ND, no masses, no hernias, no hepatosplenomegaly  Lymphatic: Neck and groin nodes negative  Skin: The skin is warm and dry. No jaundice.  Ext: No c/c/e.    GUMale: Test desc clayton, no abnormalities of epididymus. Penis circumcised, with normal penile and scrotal skin. Meatus normal. Normal rectal tone, no 1-1/2 finger breadth wide, flat, smooth, soft, nontender, no nodules or masses appreciated. SV not palpable. Perineum and anus normal.      Labs:    Latest Reference Range & Units 08/15/24 08:26   Prostate Specific Antigen <=4.00 ng/mL 0.31       Impression:  Overactive bladder    Plan:  Instructed patient to discontinue mirabegron and start Gemtesa 75 mg p.o. daily.  Instructed patient on timed voiding every 2-3 hrs, double voiding, avoidance of bladder irritants such as alcohol, citrus foods, chocolate, caffeinated drinks, energy drinks, spicy foods, sugar, caffeine products, sodas. Instructed patient to avoid drinking fluids 1-2 hours prior to bedtime & void immediately before bedtime.   RTC 3 months with PSA.  Instructed patient if develops any abnormal urologic symptoms notify clinic to be re-evaluate treated or during after hours go to emergency room versus urgent here.                           [facility]

## 2024-11-01 DIAGNOSIS — R35.1 NOCTURIA: Primary | ICD-10-CM

## 2024-12-10 ENCOUNTER — OFFICE VISIT (OUTPATIENT)
Dept: UROLOGY | Facility: CLINIC | Age: 54
End: 2024-12-10
Payer: MEDICARE

## 2024-12-10 VITALS
RESPIRATION RATE: 20 BRPM | DIASTOLIC BLOOD PRESSURE: 87 MMHG | HEART RATE: 102 BPM | TEMPERATURE: 98 F | SYSTOLIC BLOOD PRESSURE: 159 MMHG | BODY MASS INDEX: 38.22 KG/M2 | OXYGEN SATURATION: 97 % | HEIGHT: 72 IN | WEIGHT: 282.19 LBS

## 2024-12-10 DIAGNOSIS — N40.1 BENIGN PROSTATIC HYPERPLASIA WITH NOCTURIA: ICD-10-CM

## 2024-12-10 DIAGNOSIS — R35.1 NOCTURIA: Primary | ICD-10-CM

## 2024-12-10 DIAGNOSIS — R35.1 BENIGN PROSTATIC HYPERPLASIA WITH NOCTURIA: ICD-10-CM

## 2024-12-10 LAB — POC RESIDUAL URINE VOLUME: 8 ML (ref 0–100)

## 2024-12-10 PROCEDURE — 99213 OFFICE O/P EST LOW 20 MIN: CPT | Mod: S$PBB,,, | Performed by: NURSE PRACTITIONER

## 2024-12-10 PROCEDURE — 3079F DIAST BP 80-89 MM HG: CPT | Mod: CPTII,,, | Performed by: NURSE PRACTITIONER

## 2024-12-10 PROCEDURE — 3044F HG A1C LEVEL LT 7.0%: CPT | Mod: CPTII,,, | Performed by: NURSE PRACTITIONER

## 2024-12-10 PROCEDURE — 3008F BODY MASS INDEX DOCD: CPT | Mod: CPTII,,, | Performed by: NURSE PRACTITIONER

## 2024-12-10 PROCEDURE — 4010F ACE/ARB THERAPY RXD/TAKEN: CPT | Mod: CPTII,,, | Performed by: NURSE PRACTITIONER

## 2024-12-10 PROCEDURE — 99213 OFFICE O/P EST LOW 20 MIN: CPT | Mod: PBBFAC | Performed by: NURSE PRACTITIONER

## 2024-12-10 PROCEDURE — 51798 US URINE CAPACITY MEASURE: CPT | Mod: PBBFAC | Performed by: NURSE PRACTITIONER

## 2024-12-10 PROCEDURE — 3077F SYST BP >= 140 MM HG: CPT | Mod: CPTII,,, | Performed by: NURSE PRACTITIONER

## 2024-12-10 NOTE — PROGRESS NOTES
Chief Complaint:   Chief Complaint   Patient presents with    Nocturia       HPI:   Patient is a 54-year-old male here for one-month follow-up with history of nocturia.  Patient seen by PCP on 12/20/2024 with complaints of nocturia.  On patient's last visit presented with urinary frequency 5-7 times during the day and 8-10 times at night.  Patient failed mirabegron, patient currently on Gemtesa 75 mg p.o. daily.  Patient has a history of diabetes currently on Mounjaro 5 mg injections weekly.  Patient's previous PSA 0.31.  Today patient presents with much improved symptoms of his nocturia and urinary frequency due to use of Gemtesa.  Bladder scan 8 mL.        Allergies:  Review of patient's allergies indicates:   Allergen Reactions    Corticosteroids (glucocorticoids) Other (See Comments)     Makes his sugar levels increase drastically.    Meperidine Itching and Nausea And Vomiting       Medications:  Current Outpatient Medications   Medication Sig Dispense Refill    aspirin (ECOTRIN) 81 MG EC tablet Take 1 tablet (81 mg total) by mouth every morning. Increase to twice a day for 6 weeks post-op for blood clot prevention.  0    brexpiprazole (REXULTI) 0.25 mg Tab Take 0.25 mg by mouth every evening.      busPIRone (BUSPAR) 15 MG tablet Take 15 mg by mouth 3 (three) times daily.      carvediloL (COREG) 6.25 MG tablet Take 12.5 mg by mouth 2 (two) times daily. Pt takes 12.5mg      daridorexant (QUVIVIQ) 25 mg Tab Take 25 mg by mouth every evening.      FLUoxetine 20 MG capsule Take 40 mg by mouth once daily.      FLUoxetine 20 MG capsule Take 20 mg by mouth once daily. Takes 40mg in AM and 20mg at noon      fluticasone propionate (FLONASE) 50 mcg/actuation nasal spray 1 spray by Each Nostril route.      gabapentin (NEURONTIN) 600 MG tablet Take 600 mg by mouth 4 (four) times daily.      hydrOXYzine pamoate (VISTARIL) 50 MG Cap Take 100 mg by mouth nightly as needed (Insomnia).      levocetirizine (XYZAL) 5 MG tablet  "Take 5 mg by mouth every evening.      lisinopriL (PRINIVIL,ZESTRIL) 40 MG tablet Take 40 mg by mouth once daily.      MOUNJARO 5 mg/0.5 mL PnIj Inject 5 mg into the skin every 7 days.      pen needle, diabetic 31 gauge x 5/16" Ndle USE THREE TIMES WITH NOVOLOG INSULIN AND DAILY WITH BASAGLAR      rosuvastatin (CRESTOR) 20 MG tablet Take 1 tablet by mouth every morning.      vibegron (GEMTESA) 75 mg Tab Take 1 tablet (75 mg total) by mouth once daily. 30 tablet 11    diclofenac sodium (VOLTAREN) 1 % Gel Apply 2 g topically 4 (four) times daily as needed (pain). Do not exceed 32 grams/day: do not to exceed 8 grams/day/single joint of upper extremities; do not to exceed 16 grams/day/single joint of lower extremities.  Please request refill of this medication from your PCP. 100 g 3     No current facility-administered medications for this visit.       Review of Systems:  General: No fever, chills, fatigability, or weight loss.  Skin: No rashes, itching, or changes in color or texture of skin.  Chest: Denies SMITH, cyanosis, wheezing, cough, and sputum production.  Abdomen: Appetite fine. No weight loss. Denies diarrhea, abdominal pain, hematemesis, or blood in stool.  Musculoskeletal: No joint stiffness or swelling. Denies back pain.  : As above.  All other review of systems negative.    PMH:  Past Medical History:   Diagnosis Date    Coronary artery disease     Depression     Diabetes mellitus, type 2     Hypertension     Primary osteoarthritis of right hip 04/09/2024    Schizophrenia, unspecified     Sleep apnea, unspecified     cpap       PSH:  Past Surgical History:   Procedure Laterality Date    APPENDECTOMY      arm fracture surgery Left     cardiac stents      2x    ORIF FACIAL FRACTURE Left     ROBOTIC ARTHROPLASTY, HIP Right 4/9/2024    Procedure: ROBOTIC ARTHROPLASTY,HIP;  Surgeon: Ke Joe MD;  Location: [facility];  Service: Orthopedics;  Laterality: Right;  Rk    SINUS SURGERY         FamHx:  Family " History   Family history unknown: Yes       SocHx:  Social History     Socioeconomic History    Marital status:    Tobacco Use    Smoking status: Former     Average packs/day: 2.0 packs/day for 30.0 years (60.0 ttl pk-yrs)     Types: Cigarettes     Start date:      Quit date:      Years since quittin.9     Passive exposure: Current    Smokeless tobacco: Never    Tobacco comments:     Quit 2 years ago    Substance and Sexual Activity    Alcohol use: Not Currently     Comment: Quit 5 years ago    Drug use: Not Currently     Comment: Quit 5 years ago.    Sexual activity: Not Currently     Social Drivers of Health     Financial Resource Strain: Unknown (2019)    [facility] and Its Subsidiaries and Affiliates, [email or web address] [facility] and Its Subsidiaries and Affiliates    Overall Financial Resource Strain (CARDIA)     Difficulty of Paying Living Expenses: Patient declined   Food Insecurity: Unknown (2019)    Received from [email or web address] [facility] and Its Subsidiaries and Affiliates, [facility] [facility] and Its Subsidiaries and Affiliates    Hunger Vital Sign     Worried About Running Out of Food in the Last Year: Patient declined     Ran Out of Food in the Last Year: Patient declined   Transportation Needs: Unknown (2019)    [facility] and Affiliates, [facility] and Its Subsidiaries and Affiliates    PRAPARE - Transportation     Lack of Transportation (Medical): Patient declined     Lack of Transportation (Non-Medical): Patient declined   Physical Activity: Unknown (2019)    [facility] Its Sub[facility]idi[facility] and Affiliates, [facility] and Its  Subsidiaries and Affiliates    Exercise Vital Sign     Days of Exercise per Week: Patient declined     Minutes of Exercise per Session: Patient declined   Stress: Stress Concern Present (9/12/2019)    [facility], [facility] and Its Subsidiaries and Affiliates    [place] of Occupational Health - Occupational Stress Questionnaire     Feeling of Stress : To some extent       Physical Exam:  There were no vitals filed for this visit.  General: A&Ox3, no apparent distress, no deformities  Neck: No masses, normal thyroid  Lungs: CTA clayton, no use of accessory muscles  Heart: RRR, no arrhythmias  Abdomen: Soft, NT, ND, no masses, no hernias, no hepatosplenomegaly  Lymphatic: Neck and groin nodes negative  Skin: The skin is warm and dry. No jaundice.  Ext: No c/c/e.    GUMale:  Deferred YOGI this time.      Labs:    Latest Reference Range & Units 08/15/24 08:26   Prostate Specific Antigen <=4.00 ng/mL 0.31         Impression:  1. Nocturia      Plan:  Instructed patient to continue Gemtesa 75 mg p.o. daily.  Instructed patient on timed voiding every 2-3 hrs, double voiding, avoidance of bladder irritants such as alcohol, citrus foods, chocolate, caffeinated drinks, energy drinks, spicy foods, sugar, caffeine products, sodas. Instructed patient to avoid drinking fluids 1-2 hours prior to bedtime & void immediately before bedtime.   RTC 6 months with PSA and bladder scan.  Instructed patient if develops any abnormal urologic symptoms notify clinic to be re-evaluate treated or during after hours go to emergency room versus urgent here.                           [facility]

## 2025-02-12 ENCOUNTER — HOSPITAL ENCOUNTER (OUTPATIENT)
Dept: RADIOLOGY | Facility: CLINIC | Age: 55
Discharge: HOME | End: 2025-02-12
Attending: PHYSICIAN ASSISTANT
Payer: MEDICARE

## 2025-02-12 ENCOUNTER — OFFICE VISIT (OUTPATIENT)
Dept: ORTHOPEDICS | Facility: CLINIC | Age: 55
End: 2025-02-12
Payer: MEDICARE

## 2025-02-12 VITALS
HEIGHT: 71 IN | WEIGHT: 293 LBS | SYSTOLIC BLOOD PRESSURE: 152 MMHG | DIASTOLIC BLOOD PRESSURE: 92 MMHG | BODY MASS INDEX: 41.02 KG/M2 | HEART RATE: 98 BPM

## 2025-02-12 DIAGNOSIS — M25.552 PAIN OF LEFT HIP: ICD-10-CM

## 2025-02-12 DIAGNOSIS — M16.12 PRIMARY OSTEOARTHRITIS OF LEFT HIP: Primary | ICD-10-CM

## 2025-02-12 DIAGNOSIS — Z96.641 HISTORY OF TOTAL HIP REPLACEMENT, RIGHT: ICD-10-CM

## 2025-02-12 PROCEDURE — 73502 X-RAY EXAM HIP UNI 2-3 VIEWS: CPT | Mod: LT,,, | Performed by: PHYSICIAN ASSISTANT

## 2025-02-12 PROCEDURE — 1159F MED LIST DOCD IN RCRD: CPT | Mod: CPTII,,, | Performed by: PHYSICIAN ASSISTANT

## 2025-02-12 PROCEDURE — 3044F HG A1C LEVEL LT 7.0%: CPT | Mod: CPTII,,, | Performed by: PHYSICIAN ASSISTANT

## 2025-02-12 PROCEDURE — 3008F BODY MASS INDEX DOCD: CPT | Mod: CPTII,,, | Performed by: PHYSICIAN ASSISTANT

## 2025-02-12 PROCEDURE — 99213 OFFICE O/P EST LOW 20 MIN: CPT | Mod: ,,, | Performed by: PHYSICIAN ASSISTANT

## 2025-02-12 PROCEDURE — 3077F SYST BP >= 140 MM HG: CPT | Mod: CPTII,,, | Performed by: PHYSICIAN ASSISTANT

## 2025-02-12 PROCEDURE — 1160F RVW MEDS BY RX/DR IN RCRD: CPT | Mod: CPTII,,, | Performed by: PHYSICIAN ASSISTANT

## 2025-02-12 PROCEDURE — 3080F DIAST BP >= 90 MM HG: CPT | Mod: CPTII,,, | Performed by: PHYSICIAN ASSISTANT

## 2025-02-12 RX ORDER — HYDROCODONE BITARTRATE AND ACETAMINOPHEN 10; 325 MG/1; MG/1
TABLET ORAL
COMMUNITY
Start: 2025-01-16

## 2025-02-12 RX ORDER — LINACLOTIDE 145 UG/1
145 CAPSULE, GELATIN COATED ORAL
COMMUNITY

## 2025-02-12 RX ORDER — MIRABEGRON 50 MG/1
1 TABLET, FILM COATED, EXTENDED RELEASE ORAL
COMMUNITY

## 2025-02-12 NOTE — PROGRESS NOTES
Chief Complaint:   Chief Complaint   Patient presents with    Right Hip - Pain     Pt states that after a back surgery in 10/24 his Rt hip been hurting and getting numb. He is unsure if the discomfort is caused by the nerves healing. Pt also states the left hip started hurting after the surgery as well. Reports a fall in the bathroom about 4 days ago. He is requesting a x-ray to check.        History of present illness:    55-year-old male presents office today for evaluation his right thigh pain.  This has been present since October following his spinal fusion.  He underwent right total hip arthroplasty last year and recovered well but this was complicated by back pain.  He has a history of spinal stenosis with disc disease.  He underwent multilevel spinal fusion in October but continues to have weakness in the legs especially in the right thigh.  Therapy has been helpful but limited with the weakness.  He is also complaining of some left hip pain.  This has in the groin.    Past Medical History:   Diagnosis Date    Coronary artery disease     Depression     Diabetes mellitus, type 2     Hypertension     Primary osteoarthritis of right hip 04/09/2024    Schizophrenia, unspecified     Sleep apnea, unspecified     cpap       Past Surgical History:   Procedure Laterality Date    APPENDECTOMY      arm fracture surgery Left     cardiac stents      2x    ORIF FACIAL FRACTURE Left     ROBOTIC ARTHROPLASTY, HIP Right 4/9/2024    Procedure: ROBOTIC ARTHROPLASTY,HIP;  Surgeon: Ke Joe MD;  Location: [facility];  Service: Orthopedics;  Laterality: Right;  Rk    SINUS SURGERY         Current Outpatient Medications   Medication Sig    aspirin (ECOTRIN) 81 MG EC tablet Take 1 tablet (81 mg total) by mouth every morning. Increase to twice a day for 6 weeks post-op for blood clot prevention.    brexpiprazole (REXULTI) 0.25 mg Tab Take 0.25 mg by mouth every evening.    busPIRone (BUSPAR) 15 MG tablet Take 15 mg by mouth 3  "(three) times daily.    carvediloL (COREG) 6.25 MG tablet Take 12.5 mg by mouth 2 (two) times daily. Pt takes 12.5mg    daridorexant (QUVIVIQ) 25 mg Tab Take 25 mg by mouth every evening.    FLUoxetine 20 MG capsule Take 40 mg by mouth once daily.    FLUoxetine 20 MG capsule Take 20 mg by mouth once daily. Takes 40mg in AM and 20mg at noon    fluticasone propionate (FLONASE) 50 mcg/actuation nasal spray 1 spray by Each Nostril route.    gabapentin (NEURONTIN) 600 MG tablet Take 600 mg by mouth 4 (four) times daily. 800 mg 4 x a day    HYDROcodone-acetaminophen (NORCO)  mg per tablet     hydrOXYzine pamoate (VISTARIL) 50 MG Cap Take 100 mg by mouth nightly as needed (Insomnia).    levocetirizine (XYZAL) 5 MG tablet Take 5 mg by mouth every evening.    lisinopriL (PRINIVIL,ZESTRIL) 40 MG tablet Take 40 mg by mouth once daily.    MOUNJARO 5 mg/0.5 mL PnIj Inject 5 mg into the skin every 7 days.    pen needle, diabetic 31 gauge x 5/16" Ndle USE THREE TIMES WITH NOVOLOG INSULIN AND DAILY WITH BASAGLAR    rosuvastatin (CRESTOR) 20 MG tablet Take 1 tablet by mouth every morning.    vibegron (GEMTESA) 75 mg Tab Take 1 tablet (75 mg total) by mouth once daily.    diclofenac sodium (VOLTAREN) 1 % Gel Apply 2 g topically 4 (four) times daily as needed (pain). Do not exceed 32 grams/day: do not to exceed 8 grams/day/single joint of upper extremities; do not to exceed 16 grams/day/single joint of lower extremities.  Please request refill of this medication from your PCP.    LINZESS 145 mcg Cap capsule Take 145 mcg by mouth.    mirabegron (MYRBETRIQ) 50 mg Tb24 Take 1 tablet by mouth. (Patient not taking: Reported on 2/12/2025)     No current facility-administered medications for this visit.       Review of patient's allergies indicates:   Allergen Reactions    Corticosteroids (glucocorticoids) Other (See Comments)     Makes his sugar levels increase drastically.    Meperidine Itching and Nausea And Vomiting       Family " History   Family history unknown: Yes       Social History     Socioeconomic History    Marital status:    Tobacco Use    Smoking status: Former     Average packs/day: 2.0 packs/day for 30.0 years (60.0 ttl pk-yrs)     Types: Cigarettes     Start date:      Quit date:      Years since quittin.1     Passive exposure: Current    Smokeless tobacco: Never    Tobacco comments:     Quit 2 years ago    Substance and Sexual Activity    Alcohol use: Not Currently     Comment: Quit 5 years ago    Drug use: Not Currently     Comment: Quit 5 years ago.    Sexual activity: Not Currently     Social Drivers of Health     Financial Resource Strain: Unknown (2019)    Received from [facility] and Its Subsidiaries and Affiliates, [facility] and Its Subsidiaries and Affiliates    Overall Financial Resource Strain (CARDIA)     Difficulty of Paying Living Expenses: Patient declined   Food Insecurity: Unknown (2019)    Received from [facility] and Its Subsidiaries and Affiliates, [facility] and Its Subsidiaries and Affiliates    Hunger Vital Sign     Worried About Running Out of Food in the Last Year: Patient declined     Ran Out of Food in the Last Year: Patient declined   Transportation Needs: Unknown (2019)    [facility] and Affiliates, [facility] and Its Subsidiaries and Affiliates    PRAPARE - Transportation     Lack of Transportation (Medical): Patient declined     Lack of Transportation (Non-Medical): Patient declined   Physical Activity: Unknown (2019)    [facility] Subsidiaries and Affiliates, [facility] and Its Subsidiaries and  "Affiliates    Exercise Vital Sign     Days of Exercise per Week: Patient declined     Minutes of Exercise per Session: Patient declined   Stress: Stress Concern Present (9/12/2019)    Received from [facility] and Its Subsidiaries and Affiliates, [facility] and Its Subsidiaries and Affiliates    [facility] of Occupational Health - Occupational Stress Questionnaire     Feeling of Stress : To some extent         Review of Systems:    Constitution:   Denies chills, fever, and sweats.  HENT:   Denies headaches or blurry vision.  Cardiovascular:  Denies chest pain or irregular heart beat.  Respiratory:   Denies cough or shortness of breath.  Gastrointestinal:  Denies abdominal pain, nausea, or vomiting.  Musculoskeletal:   Denies muscle cramps.  Neurological:   Denies dizziness or focal weakness.  Psychiatric/Behavior: Normal mental status.  Hematology/Lymph:  Denies bleeding problem or easy bruising/bleeding.  Skin:    Denies rash or suspicious lesions.    Examination:    Vital Signs:    Vitals:    02/12/25 0912 02/12/25 0914   BP:  (!) 152/92   Pulse:  98   Weight: 132.9 kg (293 lb)    Height: 5' 11" (1.803 m)    PainSc:    8       Body mass index is 40.87 kg/m².    Constitution:   Well-developed, well nourished patient in no acute distress.  Neurological:   Alert and oriented x 3 and cooperative to examination.     Psychiatric/Behavior: Normal mental status.  Respiratory:   No shortness of breath.  Nonlabored breathing  Cardiovascular:           Regular rate and rhythm  Eyes:    Extraoccular muscles intact  Skin:    No scars, rash or suspicious lesions.    Physical Exam:     Left hip exam     No signs of edema, erythema, or induration.    Tender over the greater trochanteric region.    Pain with internal and external rotation    Passive hip abduction 30 degrees   Passive hip flexion 90 degrees   Passive internal rotation 25 degrees "   Passive external rotation 45 degrees   No weakness of the left hip was observed. An antalgic gait was observed. limping was noted     xrays    Left hip x-ray with two or more views of the AP and lateral aspects were performed.   Impressions   Joint space narrowing with osteophytes arising from the hip joint and subchondral cysts seen.  Intact right hip prostheses    Right Hip     No swelling, induration or tenderness    Well healed scar    No instability of the hip was noted. Motion was normal.     Quad weakness observed    Sensation intact distally    Intact pedal pulses            Assessment:     Primary osteoarthritis of left hip  -     X-Ray Hip 2 or 3 views Left with Pelvis when performed; Future; Expected date: 02/12/2025    History of total hip replacement, right        Plan:      I have discussed exam and x-ray findings with the patient today he does have some mild-to-moderate osteoarthritis of the left hip.  He has some weakness in his hip flexors and quads but they are intact.  I do feel that this is still part of his recovering from his spine surgery which was only in October.  He needs to get back in with his spine surgeon for further evaluation.  He needs to continue with physical therapy.  He will follow up with us as needed        No follow-ups on file.    DISCLAIMER: This note may have been dictated using voice recognition software and may contain grammatical errors.

## 2025-03-11 DIAGNOSIS — M47.896 OTHER OSTEOARTHRITIS OF SPINE, LUMBAR REGION: ICD-10-CM

## 2025-03-11 DIAGNOSIS — M54.16 LUMBAR RADICULOPATHY: Primary | ICD-10-CM

## 2025-03-11 DIAGNOSIS — M54.59 OTHER LOW BACK PAIN: ICD-10-CM

## 2025-03-14 ENCOUNTER — LAB VISIT (OUTPATIENT)
Dept: LAB | Facility: HOSPITAL | Age: 55
End: 2025-03-14
Attending: NURSE PRACTITIONER
Payer: MEDICARE

## 2025-03-14 ENCOUNTER — HOSPITAL ENCOUNTER (OUTPATIENT)
Dept: RADIOLOGY | Facility: HOSPITAL | Age: 55
Discharge: HOME | End: 2025-03-14
Attending: PAIN MEDICINE
Payer: MEDICARE

## 2025-03-14 DIAGNOSIS — M54.16 LUMBAR RADICULOPATHY: ICD-10-CM

## 2025-03-14 DIAGNOSIS — M47.896 OTHER OSTEOARTHRITIS OF SPINE, LUMBAR REGION: ICD-10-CM

## 2025-03-14 DIAGNOSIS — R35.1 NOCTURIA: ICD-10-CM

## 2025-03-14 DIAGNOSIS — M54.59 OTHER LOW BACK PAIN: ICD-10-CM

## 2025-03-14 DIAGNOSIS — N40.1 BENIGN PROSTATIC HYPERPLASIA WITH NOCTURIA: ICD-10-CM

## 2025-03-14 DIAGNOSIS — R35.1 BENIGN PROSTATIC HYPERPLASIA WITH NOCTURIA: ICD-10-CM

## 2025-03-14 LAB — PSA SERPL-MCNC: 0.26 NG/ML

## 2025-03-14 PROCEDURE — 84153 ASSAY OF PSA TOTAL: CPT

## 2025-03-14 PROCEDURE — 36415 COLL VENOUS BLD VENIPUNCTURE: CPT

## 2025-03-14 PROCEDURE — 72148 MRI LUMBAR SPINE W/O DYE: CPT | Mod: TC

## 2025-03-20 ENCOUNTER — OFFICE VISIT (OUTPATIENT)
Dept: NEUROLOGY | Facility: CLINIC | Age: 55
End: 2025-03-20
Payer: MEDICARE

## 2025-03-20 VITALS
SYSTOLIC BLOOD PRESSURE: 176 MMHG | DIASTOLIC BLOOD PRESSURE: 68 MMHG | BODY MASS INDEX: 40.29 KG/M2 | RESPIRATION RATE: 18 BRPM | WEIGHT: 287.81 LBS | HEART RATE: 72 BPM | TEMPERATURE: 98 F | OXYGEN SATURATION: 97 % | HEIGHT: 71 IN

## 2025-03-20 DIAGNOSIS — G47.33 OSA (OBSTRUCTIVE SLEEP APNEA): Chronic | ICD-10-CM

## 2025-03-20 DIAGNOSIS — E66.813 CLASS 3 SEVERE OBESITY WITH SERIOUS COMORBIDITY AND BODY MASS INDEX (BMI) OF 40.0 TO 44.9 IN ADULT, UNSPECIFIED OBESITY TYPE: Chronic | ICD-10-CM

## 2025-03-20 DIAGNOSIS — E66.01 CLASS 3 SEVERE OBESITY WITH SERIOUS COMORBIDITY AND BODY MASS INDEX (BMI) OF 40.0 TO 44.9 IN ADULT, UNSPECIFIED OBESITY TYPE: Chronic | ICD-10-CM

## 2025-03-20 DIAGNOSIS — G43.E01 CHRONIC MIGRAINE WITH AURA, NOT INTRACTABLE, WITH STATUS MIGRAINOSUS: Primary | Chronic | ICD-10-CM

## 2025-03-20 PROBLEM — I20.9 ANGINA PECTORIS, UNSPECIFIED: Status: ACTIVE | Noted: 2023-09-29

## 2025-03-20 PROBLEM — Z72.0 TOBACCO USER: Status: RESOLVED | Noted: 2020-08-13 | Resolved: 2025-03-20

## 2025-03-20 PROCEDURE — 99215 OFFICE O/P EST HI 40 MIN: CPT | Mod: PBBFAC | Performed by: NURSE PRACTITIONER

## 2025-03-20 PROCEDURE — 3008F BODY MASS INDEX DOCD: CPT | Mod: CPTII,,, | Performed by: NURSE PRACTITIONER

## 2025-03-20 PROCEDURE — 1160F RVW MEDS BY RX/DR IN RCRD: CPT | Mod: CPTII,,, | Performed by: NURSE PRACTITIONER

## 2025-03-20 PROCEDURE — 3044F HG A1C LEVEL LT 7.0%: CPT | Mod: CPTII,,, | Performed by: NURSE PRACTITIONER

## 2025-03-20 PROCEDURE — 3077F SYST BP >= 140 MM HG: CPT | Mod: CPTII,,, | Performed by: NURSE PRACTITIONER

## 2025-03-20 PROCEDURE — G2211 COMPLEX E/M VISIT ADD ON: HCPCS | Mod: S$PBB,,, | Performed by: NURSE PRACTITIONER

## 2025-03-20 PROCEDURE — 99205 OFFICE O/P NEW HI 60 MIN: CPT | Mod: S$PBB,,, | Performed by: NURSE PRACTITIONER

## 2025-03-20 PROCEDURE — 1159F MED LIST DOCD IN RCRD: CPT | Mod: CPTII,,, | Performed by: NURSE PRACTITIONER

## 2025-03-20 PROCEDURE — 3078F DIAST BP <80 MM HG: CPT | Mod: CPTII,,, | Performed by: NURSE PRACTITIONER

## 2025-03-20 PROCEDURE — 4010F ACE/ARB THERAPY RXD/TAKEN: CPT | Mod: CPTII,,, | Performed by: NURSE PRACTITIONER

## 2025-03-20 RX ORDER — CLONIDINE HYDROCHLORIDE 0.1 MG/1
0.1 TABLET ORAL 2 TIMES DAILY PRN
COMMUNITY
Start: 2025-01-28

## 2025-03-20 RX ORDER — ATOGEPANT 60 MG/1
60 TABLET ORAL DAILY
Qty: 60 TABLET | Refills: 4 | Status: SHIPPED | OUTPATIENT
Start: 2025-03-20 | End: 2026-03-20

## 2025-03-20 RX ORDER — NALOXONE HYDROCHLORIDE 4 MG/.1ML
SPRAY NASAL
COMMUNITY
Start: 2024-10-18

## 2025-03-20 RX ORDER — ZOLPIDEM TARTRATE 10 MG/1
10 TABLET ORAL NIGHTLY PRN
COMMUNITY
Start: 2025-03-17

## 2025-03-20 RX ORDER — PANTOPRAZOLE SODIUM 40 MG/1
1 TABLET, DELAYED RELEASE ORAL EVERY MORNING
COMMUNITY
Start: 2025-01-08 | End: 2026-01-08

## 2025-03-20 RX ORDER — DEXBROMPHENIRAMINE MALEATE 2 MG/1
1 TABLET ORAL EVERY 8 HOURS PRN
COMMUNITY
Start: 2025-02-24

## 2025-03-20 RX ORDER — ALBUTEROL SULFATE 90 UG/1
2 INHALANT RESPIRATORY (INHALATION) EVERY 4 HOURS PRN
COMMUNITY
Start: 2025-02-24

## 2025-03-20 RX ORDER — BLOOD-GLUCOSE SENSOR
EACH MISCELLANEOUS
COMMUNITY
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
## 2025-05-12 ENCOUNTER — OFFICE VISIT (OUTPATIENT)
Dept: ORTHOPEDICS | Facility: CLINIC | Age: 55
End: 2025-05-12
Payer: MEDICARE

## 2025-05-12 ENCOUNTER — HOSPITAL ENCOUNTER (OUTPATIENT)
Dept: RADIOLOGY | Facility: CLINIC | Age: 55
Discharge: HOME | End: 2025-05-12
Attending: PHYSICIAN ASSISTANT
Payer: MEDICARE

## 2025-05-12 VITALS
HEIGHT: 71 IN | WEIGHT: 288 LBS | SYSTOLIC BLOOD PRESSURE: 156 MMHG | DIASTOLIC BLOOD PRESSURE: 89 MMHG | HEART RATE: 72 BPM | BODY MASS INDEX: 40.32 KG/M2

## 2025-05-12 DIAGNOSIS — Z96.641 HISTORY OF TOTAL HIP REPLACEMENT, RIGHT: Primary | ICD-10-CM

## 2025-05-12 DIAGNOSIS — M16.12 PRIMARY OSTEOARTHRITIS OF LEFT HIP: ICD-10-CM

## 2025-05-12 DIAGNOSIS — Z96.641 HISTORY OF TOTAL HIP REPLACEMENT, RIGHT: ICD-10-CM

## 2025-05-12 DIAGNOSIS — M25.552 LEFT HIP PAIN: ICD-10-CM

## 2025-05-12 PROCEDURE — 99213 OFFICE O/P EST LOW 20 MIN: CPT | Mod: ,,, | Performed by: PHYSICIAN ASSISTANT

## 2025-05-12 PROCEDURE — 3044F HG A1C LEVEL LT 7.0%: CPT | Mod: CPTII,,, | Performed by: PHYSICIAN ASSISTANT

## 2025-05-12 PROCEDURE — 3008F BODY MASS INDEX DOCD: CPT | Mod: CPTII,,, | Performed by: PHYSICIAN ASSISTANT

## 2025-05-12 PROCEDURE — 1160F RVW MEDS BY RX/DR IN RCRD: CPT | Mod: CPTII,,, | Performed by: PHYSICIAN ASSISTANT

## 2025-05-12 PROCEDURE — 73502 X-RAY EXAM HIP UNI 2-3 VIEWS: CPT | Mod: LT,,, | Performed by: PHYSICIAN ASSISTANT

## 2025-05-12 PROCEDURE — 4010F ACE/ARB THERAPY RXD/TAKEN: CPT | Mod: CPTII,,, | Performed by: PHYSICIAN ASSISTANT

## 2025-05-12 PROCEDURE — 1159F MED LIST DOCD IN RCRD: CPT | Mod: CPTII,,, | Performed by: PHYSICIAN ASSISTANT

## 2025-05-12 NOTE — PROGRESS NOTES
Chief Complaint:   Chief Complaint   Patient presents with    Right Hip - Follow-up     Pt states he is doing very well in regards his hip. He is having some issues with his lower back which is being addressed with Dr. Gandara. Pt is requesting a x-ray for the left hip as well. He is having some discomfort in the buttock at times.        History of present illness:      History of Present Illness    CHIEF COMPLAINT:  - Mr. Pang presents for one-year follow-up s/p right total hip arthroplasty, with concerns about ongoing back pain and left hip discomfort.    HPI:  Mr. Pang presents for follow-up one year after right hip replacement. His right hip is functioning well, but he reports aggravating pains which he is unsure if they are related to his back. He is currently experiencing ongoing back issues that complicated his post-surgical recovery. He feels a slight downward jerk and stiffness in his left hip. He has a persistent limp on the right side and significant discomfort, particularly when getting out of bed in the morning.    For his back issues, he has been seeing Dr. Gandara, who recommended another fusion for a herniated disc that is still bulging. He has undergone multiple tests including CTs and MRIs. His left hip is occasionally symptomatic, and he sometimes loses feeling, causing him to drop things. He mentions being diabetic and healing slowly.    Mr. Pang denies any medical diagnoses.    PREVIOUS TREATMENTS:  - Fusion surgery on the back    SURGICAL HISTORY:  - Right hip replacement: 1 year ago          Past Medical History:   Diagnosis Date    Coronary artery disease     Depression     Diabetes mellitus, type 2     Hypertension     Primary osteoarthritis of right hip 04/09/2024    Schizophrenia, unspecified     Sleep apnea, unspecified     cpap       Past Surgical History:   Procedure Laterality Date    APPENDECTOMY      arm fracture surgery Left     cardiac stents      2x    ORIF FACIAL FRACTURE  Left     ROBOTIC ARTHROPLASTY, HIP Right 4/9/2024    Procedure: ROBOTIC ARTHROPLASTY,HIP;  Surgeon: Ke Joe MD;  [place] [facility] [place]  Service: Orthopedics;  Laterality: Right;  Rk    SINUS SURGERY         Current Outpatient Medications   Medication Sig    ALA-HIST IR 2 mg Tab Take 1 tablet by mouth every 8 (eight) hours as needed.    albuterol (PROVENTIL/VENTOLIN HFA) 90 mcg/actuation inhaler Inhale 2 puffs into the lungs every 4 (four) hours as needed.    aspirin (ECOTRIN) 81 MG EC tablet Take 1 tablet (81 mg total) by mouth every morning. Increase to twice a day for 6 weeks post-op for blood clot prevention.    atogepant (QULIPTA) 60 mg Tab Take 1 tablet (60 mg total) by mouth once daily.    brexpiprazole (REXULTI) 0.25 mg Tab Take 0.25 mg by mouth every evening.    busPIRone (BUSPAR) 15 MG tablet Take 15 mg by mouth 3 (three) times daily.    carvediloL (COREG) 6.25 MG tablet Take 12.5 mg by mouth 2 (two) times daily. Pt takes 12.5mg    cloNIDine (CATAPRES) 0.1 MG tablet Take 0.1 mg by mouth 2 (two) times daily as needed.    daridorexant (QUVIVIQ) 25 mg Tab Take 25 mg by mouth every evening.    DEXCOM G7 SENSOR Angelica CONTINUOUS GLUCOSE MONITOR    FLUoxetine 20 MG capsule Take 40 mg by mouth once daily.    fluticasone propionate (FLONASE) 50 mcg/actuation nasal spray 1 spray by Each Nostril route.    gabapentin (NEURONTIN) 600 MG tablet Take 600 mg by mouth 4 (four) times daily. 800 mg 4 x a day    HYDROcodone-acetaminophen (NORCO)  mg per tablet     hydrOXYzine pamoate (VISTARIL) 50 MG Cap Take 100 mg by mouth nightly as needed (Insomnia).    levocetirizine (XYZAL) 5 MG tablet Take 5 mg by mouth every evening.    LINZESS 145 mcg Cap capsule Take 145 mcg by mouth.    lisinopriL (PRINIVIL,ZESTRIL) 40 MG tablet Take 40 mg by mouth once daily.    mirabegron (MYRBETRIQ) 50 mg Tb24 Take 1 tablet by mouth.    MOUNJARO 5 mg/0.5 mL PnIj Inject 5 mg into the skin every 7 days.    naloxone (NARCAN) 4  "mg/actuation Spry SMARTSIG:Both Nares    pantoprazole (PROTONIX) 40 MG tablet Take 1 tablet by mouth every morning.    pen needle, diabetic 31 gauge x 5/16" Ndle USE THREE TIMES WITH NOVOLOG INSULIN AND DAILY WITH BASAGLAR    rosuvastatin (CRESTOR) 20 MG tablet Take 1 tablet by mouth every morning.    ubrogepant (UBRELVY) 100 mg tablet Take 1 tablet (100 mg total) by mouth 2 (two) times daily as needed for Migraine. If symptoms persist or return, may repeat dose after 2 hours. Maximum: 200 mg per 24 hours    vibegron (GEMTESA) 75 mg Tab Take 1 tablet (75 mg total) by mouth once daily.    zolpidem (AMBIEN) 10 mg Tab Take 10 mg by mouth nightly as needed.    diclofenac sodium (VOLTAREN) 1 % Gel Apply 2 g topically 4 (four) times daily as needed (pain). Do not exceed 32 grams/day: do not to exceed 8 grams/day/single joint of upper extremities; do not to exceed 16 grams/day/single joint of lower extremities.  Please request refill of this medication from your PCP.     No current facility-administered medications for this visit.       Review of patient's allergies indicates:   Allergen Reactions    Corticosteroids (glucocorticoids) Other (See Comments)     Makes his sugar levels increase drastically.    Meperidine Itching and Nausea And Vomiting       Family History   Problem Relation Name Age of Onset    Dementia Mother      Diabetes Sister         Social History[1]      Review of Systems:    Constitution:   Denies chills, fever, and sweats.  HENT:   Denies headaches or blurry vision.  Cardiovascular:  Denies chest pain or irregular heart beat.  Respiratory:   Denies cough or shortness of breath.  Gastrointestinal:  Denies abdominal pain, nausea, or vomiting.  Musculoskeletal:   Denies muscle cramps.  Neurological:   Denies dizziness or focal weakness.  Psychiatric/Behavior: Normal mental status.  Hematology/Lymph:  Denies bleeding problem or easy bruising/bleeding.  Skin:    Denies rash or suspicious " "lesions.    Examination:    Vital Signs:    Vitals:    05/12/25 1324   Weight: 130.6 kg (288 lb)   Height: 5' 11" (1.803 m)       Body mass index is 40.17 kg/m².    Constitution:   Well-developed, well nourished patient in no acute distress.  Neurological:   Alert and oriented x 3 and cooperative to examination.     Psychiatric/Behavior: Normal mental status.  Respiratory:   No shortness of breath.  Nonlabored breathing  Cardiovascular:           Regular rate and rhythm  Eyes:    Extraoccular muscles intact  Skin:    No scars, rash or suspicious lesions.    Physical Exam:     Right Hip     No swelling, induration or tenderness    Well healed scar    No instability of the hip was noted. Motion was normal.     No weakness was observed.    Sensation intact distally    Intact pedal pulses    Complete right hip x-ray with two or more views of the AP and lateral aspects were performed.     Impressions Radiology Test   X-ray of hip was performed showed intact hip prosthesis without signs of loosening or subsidence.    Left hip exam     No signs of edema, erythema, or induration.    Tender over the greater trochanteric region.    Mild Pain with internal and external rotation    Passive hip abduction 30 degrees   Passive hip flexion 90 degrees   Passive internal rotation 25 degrees   Passive external rotation 45 degrees   No weakness of the left hip was observed. An antalgic gait was observed. limping was noted     xrays    Left hip x-ray with two or more views of the AP and lateral aspects were performed.   Impressions   Moderate Joint space narrowing with osteophytes arising from the hip joint and subchondral cysts seen      Assessment:     History of total hip replacement, right  -     X-Ray Hip 2 or 3 views Right with Pelvis when performed; Future; Expected date: 05/12/2025    Primary osteoarthritis of left hip  -     X-Ray Hip 2 or 3 views Left with Pelvis when performed; Future; Expected date: 05/12/2025        Plan:  "     Assessment & Plan    PROCEDURES:  - Discussed potential future left hip replacement due to arthritis, but determined it is not currently necessary.  - Mentioned possibility of steroid injection in left hip joint as a temporary measure if pain worsens, noting it could affect blood sugar.    FOLLOW UP:  - Follow up as needed if either hip becomes problematic.              No follow-ups on file.    This note was generated with the assistance of ambient listening technology. Verbal consent was obtained by the patient and accompanying visitor(s) for the recording of patient appointment to facilitate this note. I attest to having reviewed and edited the generated note for accuracy, though some syntax or spelling errors may persist. Please contact the author of this note for any clarification.       DISCLAIMER: This note may have been dictated using voice recognition software and may contain grammatical errors.          [1]   Social History  Socioeconomic History    Marital status:    Tobacco Use    Smoking status: Former     Average packs/day: 2.0 packs/day for 30.0 years (60.0 ttl pk-yrs)     Types: Cigarettes     Start date:      Quit date:      Years since quittin.3     Passive exposure: Current    Smokeless tobacco: Never    Tobacco comments:     Quit 2 years ago    Substance and Sexual Activity    Alcohol use: Not Currently     Comment: Quit 7 years ago    Drug use: Not Currently     Comment: Quit 7 years ago.    Sexual activity: Not Currently     Social Drivers of Health     Financial Resource Strain: Unknown (2019)    Received from [facility] and Its Subsidiaries and Affiliates    Overall Financial Resource Strain (CARDIA)     Difficulty of Paying Living Expenses: Patient declined   Food Insecurity: Unknown (2019)    Received from [facility] and Its Subsidiaries and Affiliates    Hunger Vital Sign      Worried About Running Out of Food in the Last Year: Patient declined     Ran Out of Food in the Last Year: Patient declined   Transportation Needs: Unknown (9/12/2019)    [facility]    PRAPARE - Transportation     Lack of Transportation (Medical): Patient declined     Lack of Transportation (Non-Medical): Patient declined   Physical Activity: Unknown (9/12/2019)    [facility]    Exercise Vital Sign     Days of Exercise per Week: Patient declined     Minutes of Exercise per Session: Patient declined   Stress: Stress Concern Present (9/12/2019)    Received from [facility] and Its Subsidiaries and Affiliates    [place] [employer] - Occupational Stress Questionnaire     Feeling of Stress : To some extent

## 2025-06-10 ENCOUNTER — LAB VISIT (OUTPATIENT)
Dept: LAB | Facility: HOSPITAL | Age: 55
End: 2025-06-10
Attending: NURSE PRACTITIONER
Payer: MEDICARE

## 2025-06-10 ENCOUNTER — OFFICE VISIT (OUTPATIENT)
Dept: UROLOGY | Facility: CLINIC | Age: 55
End: 2025-06-10
Payer: MEDICARE

## 2025-06-10 VITALS
HEIGHT: 72 IN | SYSTOLIC BLOOD PRESSURE: 136 MMHG | HEART RATE: 72 BPM | TEMPERATURE: 98 F | BODY MASS INDEX: 38.55 KG/M2 | WEIGHT: 284.63 LBS | RESPIRATION RATE: 20 BRPM | OXYGEN SATURATION: 99 % | DIASTOLIC BLOOD PRESSURE: 89 MMHG

## 2025-06-10 DIAGNOSIS — R35.1 BENIGN PROSTATIC HYPERPLASIA WITH NOCTURIA: Primary | ICD-10-CM

## 2025-06-10 DIAGNOSIS — F52.0: ICD-10-CM

## 2025-06-10 DIAGNOSIS — R53.83 FATIGUE, UNSPECIFIED TYPE: ICD-10-CM

## 2025-06-10 DIAGNOSIS — R35.1 NOCTURIA: ICD-10-CM

## 2025-06-10 DIAGNOSIS — N40.1 BENIGN PROSTATIC HYPERPLASIA WITH NOCTURIA: Primary | ICD-10-CM

## 2025-06-10 LAB
ESTRADIOL SERPL HS-MCNC: <24 PG/ML
POC RESIDUAL URINE VOLUME: 6 ML (ref 0–100)
PROGEST SERPL-MCNC: <0.5 NG/ML
TESTOST SERPL-MCNC: 80.67 NG/DL (ref 220.91–715.81)
TSH SERPL-ACNC: 1.47 UIU/ML (ref 0.35–4.94)

## 2025-06-10 PROCEDURE — 84403 ASSAY OF TOTAL TESTOSTERONE: CPT

## 2025-06-10 PROCEDURE — 3075F SYST BP GE 130 - 139MM HG: CPT | Mod: CPTII,,, | Performed by: NURSE PRACTITIONER

## 2025-06-10 PROCEDURE — 3044F HG A1C LEVEL LT 7.0%: CPT | Mod: CPTII,,, | Performed by: NURSE PRACTITIONER

## 2025-06-10 PROCEDURE — 84144 ASSAY OF PROGESTERONE: CPT

## 2025-06-10 PROCEDURE — 3079F DIAST BP 80-89 MM HG: CPT | Mod: CPTII,,, | Performed by: NURSE PRACTITIONER

## 2025-06-10 PROCEDURE — 1159F MED LIST DOCD IN RCRD: CPT | Mod: CPTII,,, | Performed by: NURSE PRACTITIONER

## 2025-06-10 PROCEDURE — 36415 COLL VENOUS BLD VENIPUNCTURE: CPT

## 2025-06-10 PROCEDURE — 4010F ACE/ARB THERAPY RXD/TAKEN: CPT | Mod: CPTII,,, | Performed by: NURSE PRACTITIONER

## 2025-06-10 PROCEDURE — 99213 OFFICE O/P EST LOW 20 MIN: CPT | Mod: PBBFAC | Performed by: NURSE PRACTITIONER

## 2025-06-10 PROCEDURE — 51798 US URINE CAPACITY MEASURE: CPT | Mod: PBBFAC | Performed by: NURSE PRACTITIONER

## 2025-06-10 PROCEDURE — 1160F RVW MEDS BY RX/DR IN RCRD: CPT | Mod: CPTII,,, | Performed by: NURSE PRACTITIONER

## 2025-06-10 PROCEDURE — 99213 OFFICE O/P EST LOW 20 MIN: CPT | Mod: S$PBB,,, | Performed by: NURSE PRACTITIONER

## 2025-06-10 PROCEDURE — 3008F BODY MASS INDEX DOCD: CPT | Mod: CPTII,,, | Performed by: NURSE PRACTITIONER

## 2025-06-10 PROCEDURE — 84443 ASSAY THYROID STIM HORMONE: CPT

## 2025-06-10 PROCEDURE — 86376 MICROSOMAL ANTIBODY EACH: CPT

## 2025-06-10 PROCEDURE — 82670 ASSAY OF TOTAL ESTRADIOL: CPT

## 2025-06-10 RX ORDER — OXYBUTYNIN CHLORIDE 5 MG/1
5 TABLET, EXTENDED RELEASE ORAL DAILY
Qty: 90 TABLET | Refills: 3 | Status: SHIPPED | OUTPATIENT
Start: 2025-06-10 | End: 2026-06-10

## 2025-06-10 RX ORDER — TIZANIDINE 4 MG/1
4 TABLET ORAL EVERY 8 HOURS
COMMUNITY
Start: 2025-06-05

## 2025-06-10 NOTE — PROGRESS NOTES
Chief Complaint:   Chief Complaint   Patient presents with    6 mth f/u    Benign Prostatic Hypertrophy    Nocturia       HPI:   Patient is a 54-year-old male here for 6-month follow-up with history of nocturia.  Patient seen by PCP on 12/20/2024 with complaints of nocturia.  Patient presented initially with urinary frequency 5-7 times during the day and 8-10 times at night.  Patient failed mirabegron, patient currently on Gemtesa 75 mg p.o. daily.  Patient has a history of diabetes currently on Mounjaro 5 mg injections weekly.  Patient's PSA 0.26.  Today patient presents with intermittent symptoms of his nocturia 5-6 times at night despite use of Gemtesa.  Patient also complains of fatigue and lack of sexual interest will workup for low testosterone with labs and notify patient results on Friday.  Due to patient's intermittent nocturia persisting we will initiate oxybutynin XL 5 mg p.o. daily and follow up in 2 weeks on virtual visit.  RTC 2 months for re-evaluation.  Bladder scan 6 mL.      Allergies:  Review of patient's allergies indicates:   Allergen Reactions    Corticosteroids (glucocorticoids) Other (See Comments)     Makes his sugar levels increase drastically.    Meperidine Itching and Nausea And Vomiting       Medications:  Current Medications[1]    Review of Systems:  General: No fever, chills, fatigability, or weight loss.  Skin: No rashes, itching, or changes in color or texture of skin.  Chest: Denies SMITH, cyanosis, wheezing, cough, and sputum production.  Abdomen: Appetite fine. No weight loss. Denies diarrhea, abdominal pain, hematemesis, or blood in stool.  Musculoskeletal: No joint stiffness or swelling. Denies back pain.  : As above.  All other review of systems negative.    PMH:  Past Medical History:   Diagnosis Date    Coronary artery disease     Depression     Diabetes mellitus, type 2     Hypertension     Primary osteoarthritis of right hip 04/09/2024    Schizophrenia, unspecified      Sleep apnea, unspecified     cpap       PSH:  Past Surgical History:   Procedure Laterality Date    APPENDECTOMY      arm fracture surgery Left     cardiac stents      2x    ORIF FACIAL FRACTURE Left     ROBOTIC ARTHROPLASTY, HIP Right 4/9/2024    Procedure: ROBOTIC ARTHROPLASTY,HIP;  Surgeon: Ke Joe MD;  Location: [facility];  Service: Orthopedics;  Laterality: Right;  Rk    SINUS SURGERY         FamHx:  Family History   Problem Relation Name Age of Onset    Dementia Mother      Diabetes Sister         SocHx:  Social History[2]    Physical Exam:  Vitals:    06/10/25 0853   BP: 136/89   Pulse: 72   Resp: 20   Temp: 98 °F (36.7 °C)     General: A&Ox3, no apparent distress, no deformities  Neck: No masses, normal thyroid  Lungs: CTA clayton, no use of accessory muscles  Heart: RRR, no arrhythmias  Abdomen: Soft, NT, ND, no masses, no hernias, no hepatosplenomegaly  Lymphatic: Neck and groin nodes negative  Skin: The skin is warm and dry. No jaundice.  Ext: No c/c/e.    [place]:  Deferred YOGI this time    Labs:    Latest Reference Range & Units 08/15/24 08:26 03/14/25 12:40   Prostate Specific Antigen <=4.00 ng/mL 0.31 0.26         Impression:  1. Benign prostatic hyperplasia with nocturia  - POCT Bladder Scan  2. Nocturia  3. Fatigue  4. Lack of sexual interest    Plan:  Instructed patient to continue Gemtesa 75 mg p.o. daily add oxybutynin XL 5 mg p.o. daily.  Instructed patient to perform labs for hypogonadism assessing testosterone will notify patient results on Friday.  Instructed patient on timed voiding every 2-3 hrs, double voiding, avoidance of bladder irritants such as alcohol, citrus foods, chocolate, caffeinated drinks, energy drinks, spicy foods, sugar, caffeine products, sodas. Instructed patient to avoid drinking fluids 1-2 hours prior to bedtime & void immediately before bedtime.   RTC 2 weeks audio virtual to evaluate oxybutynin restart and will RTC 2 months for re-evaluation.  Instructed patient if  develops any abnormal urologic symptoms notify clinic to be re-evaluate treated or during after hours go to emergency room versus urgent here.                           [facility]         [1]   Current Outpatient Medications   Medication Sig Dispense Refill    ALA-HIST IR 2 mg Tab Take 1 tablet by mouth every 8 (eight) hours as needed.      albuterol (PROVENTIL/VENTOLIN HFA) 90 mcg/actuation inhaler Inhale 2 puffs into the lungs every 4 (four) hours as needed.      aspirin (ECOTRIN) 81 MG EC tablet Take 1 tablet (81 mg total) by mouth every morning. Increase to twice a day for 6 weeks post-op for blood clot prevention.  0    atogepant (QULIPTA) 60 mg Tab Take 1 tablet (60 mg total) by mouth once daily. 60 tablet 4    brexpiprazole (REXULTI) 0.25 mg Tab Take 0.25 mg by mouth every evening.      busPIRone (BUSPAR) 15 MG tablet Take 15 mg by mouth 3 (three) times daily.      carvediloL (COREG) 6.25 MG tablet Take 12.5 mg by mouth 2 (two) times daily. Pt takes 12.5mg      cloNIDine (CATAPRES) 0.1 MG tablet Take 0.1 mg by mouth 2 (two) times daily as needed.      daridorexant (QUVIVIQ) 25 mg Tab Take 25 mg by mouth every evening.      DEXCOM G7 SENSOR Angelica CONTINUOUS GLUCOSE MONITOR      FLUoxetine 20 MG capsule Take 40 mg by mouth once daily.      fluticasone propionate (FLONASE) 50 mcg/actuation nasal spray 1 spray by Each Nostril route.      gabapentin (NEURONTIN) 600 MG tablet Take 600 mg by mouth 4 (four) times daily. 800 mg 4 x a day      HYDROcodone-acetaminophen (NORCO)  mg per tablet       hydrOXYzine pamoate (VISTARIL) 50 MG Cap Take 100 mg by mouth nightly as needed (Insomnia).      levocetirizine (XYZAL) 5 MG tablet Take 5 mg by mouth every evening.      LINZESS 145 mcg Cap capsule Take 145 mcg by mouth.      lisinopriL (PRINIVIL,ZESTRIL) 40 MG tablet Take 40 mg by mouth once daily.      MOUNJARO 5 mg/0.5 mL PnIj Inject 5 mg into the skin every 7 days.      naloxone (NARCAN) 4 mg/actuation Spry SMARTSIG:Both  "Nares      pantoprazole (PROTONIX) 40 MG tablet Take 1 tablet by mouth every morning.      pen needle, diabetic 31 gauge x 5/16" Ndle USE THREE TIMES WITH NOVOLOG INSULIN AND DAILY WITH BASAGLAR      rosuvastatin (CRESTOR) 20 MG tablet Take 1 tablet by mouth every morning.      tiZANidine (ZANAFLEX) 4 MG tablet Take 4 mg by mouth every 8 (eight) hours.      ubrogepant (UBRELVY) 100 mg tablet Take 1 tablet (100 mg total) by mouth 2 (two) times daily as needed for Migraine. If symptoms persist or return, may repeat dose after 2 hours. Maximum: 200 mg per 24 hours 16 tablet 2    vibegron (GEMTESA) 75 mg Tab Take 1 tablet (75 mg total) by mouth once daily. 30 tablet 11    zolpidem (AMBIEN) 10 mg Tab Take 10 mg by mouth nightly as needed.      diclofenac sodium (VOLTAREN) 1 % Gel Apply 2 g topically 4 (four) times daily as needed (pain). Do not exceed 32 grams/day: do not to exceed 8 grams/day/single joint of upper extremities; do not to exceed 16 grams/day/single joint of lower extremities.  Please request refill of this medication from your PCP. 100 g 3    mirabegron (MYRBETRIQ) 50 mg Tb24 Take 1 tablet by mouth. (Patient not taking: Reported on 6/10/2025)       No current facility-administered medications for this visit.   [2]   Social History  Socioeconomic History    Marital status:    Tobacco Use    Smoking status: Former     Average packs/day: 2.0 packs/day for 30.0 years (60.0 ttl pk-yrs)     Types: Cigarettes     Start date:      Quit date:      Years since quittin.4     Passive exposure: Current    Smokeless tobacco: Never    Tobacco comments:     Quit 2 years ago    Substance and Sexual Activity    Alcohol use: Not Currently     Comment: Quit 7 years ago    Drug use: Not Currently     Comment: Quit 7 years ago.    Sexual activity: Not Currently     Social Drivers of Health     Financial Resource Strain: Unknown (2019)    Received from [facility] " and Its Subsidiaries and Affiliates    Overall Financial Resource Strain (CARDIA)     Difficulty of Paying Living Expenses: Patient declined   Food Insecurity: Unknown (9/12/2019)    Received from [facility] and Its Subsidiaries and Affiliates    Hunger Vital Sign     Worried About Running Out of Food in the Last Year: Patient declined     Ran Out of Food in the Last Year: Patient declined   Transportation Needs: Unknown (9/12/2019)    Received from [place] [facility] and Its Subsidiaries and Affiliates    PRAPARE - Transportation     Lack of Transportation (Medical): Patient declined     Lack of Transportation (Non-Medical): Patient declined   Physical Activity: Unknown (9/12/2019)    Received from [place] [facility] and Its Subsidiaries and Affiliates    Exercise Vital Sign     Days of Exercise per Week: Patient declined     Minutes of Exercise per Session: Patient declined   Stress: Stress Concern Present (9/12/2019)    Received from [employer] [facility] and Its Subsidiaries and Affiliates    [place] [employer] - Occupational Stress Questionnaire     Feeling of Stress : To some extent

## 2025-06-10 NOTE — PROGRESS NOTES
Pt seen by NIKO Matias; Bladder scan performed w/6 mls recorded; Pt scheduled for 2 week virtual visit for lab follow up; Pt instructed to return to clinic in 2 months; Discharge paperwork given w/pt verbalizing understanding

## 2025-06-12 LAB — THYROID PEROXIDASE QUANT (OLG): <4 IU/ML

## 2025-06-25 ENCOUNTER — OFFICE VISIT (OUTPATIENT)
Dept: UROLOGY | Facility: CLINIC | Age: 55
End: 2025-06-25
Payer: MEDICARE

## 2025-06-25 DIAGNOSIS — E29.1 HYPOGONADISM IN MALE: Primary | ICD-10-CM

## 2025-06-25 PROCEDURE — 1159F MED LIST DOCD IN RCRD: CPT | Mod: CPTII,93,, | Performed by: NURSE PRACTITIONER

## 2025-06-25 PROCEDURE — 1160F RVW MEDS BY RX/DR IN RCRD: CPT | Mod: CPTII,93,, | Performed by: NURSE PRACTITIONER

## 2025-06-25 PROCEDURE — 98013 SYNCH AUDIO-ONLY EST LOW 20: CPT | Mod: 93,,, | Performed by: NURSE PRACTITIONER

## 2025-06-25 PROCEDURE — 3044F HG A1C LEVEL LT 7.0%: CPT | Mod: CPTII,93,, | Performed by: NURSE PRACTITIONER

## 2025-06-25 PROCEDURE — 4010F ACE/ARB THERAPY RXD/TAKEN: CPT | Mod: CPTII,93,, | Performed by: NURSE PRACTITIONER

## 2025-06-25 RX ORDER — CLOMIPHENE CITRATE 50 MG/1
50 TABLET ORAL DAILY
Qty: 30 TABLET | Refills: 5 | Status: SHIPPED | OUTPATIENT
Start: 2025-06-25 | End: 2025-07-25

## 2025-06-25 NOTE — PROGRESS NOTES
Established Patient - Audio Only Telehealth Visit     The patient location is:  Home  The chief complaint leading to consultation is:  Hypogonadism  Visit type: Virtual visit with audio only (telephone)  Total time spent with patient:  20  minutes  Total time spent Medical decision-making with patient: 15 minutes    The reason for the audio only service rather than synchronous audio virtual visit was related to technical difficulties or patient preference/necessity.     Each patient to whom I provide medical services by telemedicine is:  (1) informed of the relationship between the physician and patient and the respective role of any other health care provider with respect to management of the patient; and (2) notified that they may decline to receive medical services by telemedicine and may withdraw from such care at any time. Patient verbally consented to receive this service via voice-only telephone call.     This service was not originating from a related E/M service provided within the previous 7 days nor will  to an E/M service or procedure within the next 24 hours or my soonest available appointment.  Prevailing standard of care was able to be met in this audio-only visit.  Chief Complaint:  Hypogonadism, fatigue      HPI:   Patient is a 55-year-old male here for one-month audio virtual follow-up with history of nocturia, fatigue.  Patient seen by PCP on 12/20/2024 with complaints of nocturia.  Patient presented initially with urinary frequency 5-7 times during the day and 8-10 times at night.  Patient failed mirabegron, patient currently on Gemtesa 75 mg p.o. daily.  Patient has a history of diabetes currently on Mounjaro 5 mg injections weekly.  Patient's PSA 0.26.  Today patient states combination of oxybutynin XL 5 mg p.o. daily and Gemtesa 75 mg p.o. daily improving his urinary frequency and nocturia to 2 times a night.  Discussed with patient his current testosterone lab results which revealed:   Testosterone 86, estradiol, progesterone, TPO, TSH all within normal limits.  Instructed patient will start Clomid 50 mg p.o. daily and repeat testosterone, estradiol, progesterone, in 3 months.    Allergies:  Review of patient's allergies indicates:   Allergen Reactions    Corticosteroids (glucocorticoids) Other (See Comments)     Makes his sugar levels increase drastically.    Meperidine Itching and Nausea And Vomiting       Medications:  Current Medications[1]    Review of Systems:  General: No fever, chills, fatigability, or weight loss.  Skin: No rashes, itching, or changes in color or texture of skin.  Chest: Denies SMITH, cyanosis, wheezing, cough, and sputum production.  Abdomen: Appetite fine. No weight loss. Denies diarrhea, abdominal pain, hematemesis, or blood in stool.  Musculoskeletal: No joint stiffness or swelling. Denies back pain.  : As above.  All other review of systems negative.    PMH:  Past Medical History:   Diagnosis Date    Coronary artery disease     Depression     Diabetes mellitus, type 2     Hypertension     Primary osteoarthritis of right hip 04/09/2024    Schizophrenia, unspecified     Sleep apnea, unspecified     cpap       PSH:  Past Surgical History:   Procedure Laterality Date    APPENDECTOMY      arm fracture surgery Left     cardiac stents      2x    ORIF FACIAL FRACTURE Left     ROBOTIC ARTHROPLASTY, HIP Right 4/9/2024    Procedure: ROBOTIC ARTHROPLASTY,HIP;  Surgeon: Ke Joe MD;  Location: [facility];  Service: Orthopedics;  Laterality: Right;  Rk    SINUS SURGERY         FamHx:  Family History   Problem Relation Name Age of Onset    Dementia Mother      Diabetes Sister         SocHx:  Social History[2]    Physical Exam:  There were no vitals filed for this visit.    Labs:  As discussed above.      Impression:  Nocturia  Urinary frequency  Hypogonadism    Plan:  Instructed patient to continue oxybutynin XL 5 mg p.o. daily and Gemtesa 75 mg p.o. daily.  Start Clomid 50 mg  p.o. daily.  Instructed patient on timed voiding every 2-3 hrs, double voiding, avoidance of bladder irritants such as alcohol, citrus foods, chocolate, caffeinated drinks, energy drinks, spicy foods, sugar, caffeine products, sodas. Instructed patient to avoid drinking fluids 1-2 hours prior to bedtime & void immediately before bedtime.   RTC 3 months with repeat testosterone levels as mentioned above.  Instructed patient if develops any abnormal urologic symptoms notify clinic to be re-evaluate treated or during after hours go to emergency room versus urgent here.                           [facility]       [1]   Current Outpatient Medications   Medication Sig Dispense Refill    ALA-HIST IR 2 mg Tab Take 1 tablet by mouth every 8 (eight) hours as needed.      albuterol (PROVENTIL/VENTOLIN HFA) 90 mcg/actuation inhaler Inhale 2 puffs into the lungs every 4 (four) hours as needed.      aspirin (ECOTRIN) 81 MG EC tablet Take 1 tablet (81 mg total) by mouth every morning. Increase to twice a day for 6 weeks post-op for blood clot prevention.  0    atogepant (QULIPTA) 60 mg Tab Take 1 tablet (60 mg total) by mouth once daily. 60 tablet 4    brexpiprazole (REXULTI) 0.25 mg Tab Take 0.25 mg by mouth every evening.      busPIRone (BUSPAR) 15 MG tablet Take 15 mg by mouth 3 (three) times daily.      carvediloL (COREG) 6.25 MG tablet Take 12.5 mg by mouth 2 (two) times daily. Pt takes 12.5mg      cloNIDine (CATAPRES) 0.1 MG tablet Take 0.1 mg by mouth 2 (two) times daily as needed.      daridorexant (QUVIVIQ) 25 mg Tab Take 25 mg by mouth every evening.      DEXCOM G7 SENSOR Angelica CONTINUOUS GLUCOSE MONITOR      diclofenac sodium (VOLTAREN) 1 % Gel Apply 2 g topically 4 (four) times daily as needed (pain). Do not exceed 32 grams/day: do not to exceed 8 grams/day/single joint of upper extremities; do not to exceed 16 grams/day/single joint of lower extremities.  Please request refill of this medication from your PCP. 100 g 3     "FLUoxetine 20 MG capsule Take 40 mg by mouth once daily.      fluticasone propionate (FLONASE) 50 mcg/actuation nasal spray 1 spray by Each Nostril route.      gabapentin (NEURONTIN) 600 MG tablet Take 600 mg by mouth 4 (four) times daily. 800 mg 4 x a day      HYDROcodone-acetaminophen (NORCO)  mg per tablet       hydrOXYzine pamoate (VISTARIL) 50 MG Cap Take 100 mg by mouth nightly as needed (Insomnia).      levocetirizine (XYZAL) 5 MG tablet Take 5 mg by mouth every evening.      LINZESS 145 mcg Cap capsule Take 145 mcg by mouth.      lisinopriL (PRINIVIL,ZESTRIL) 40 MG tablet Take 40 mg by mouth once daily.      MOUNJARO 5 mg/0.5 mL PnIj Inject 5 mg into the skin every 7 days.      naloxone (NARCAN) 4 mg/actuation Spry SMARTSIG:Both Nares      oxybutynin (DITROPAN-XL) 5 MG TR24 Take 1 tablet (5 mg total) by mouth once daily. 90 tablet 3    pantoprazole (PROTONIX) 40 MG tablet Take 1 tablet by mouth every morning.      pen needle, diabetic 31 gauge x 5/16" Ndle USE THREE TIMES WITH NOVOLOG INSULIN AND DAILY WITH BASAGLAR      rosuvastatin (CRESTOR) 20 MG tablet Take 1 tablet by mouth every morning.      tiZANidine (ZANAFLEX) 4 MG tablet Take 4 mg by mouth every 8 (eight) hours.      ubrogepant (UBRELVY) 100 mg tablet Take 1 tablet (100 mg total) by mouth 2 (two) times daily as needed for Migraine. If symptoms persist or return, may repeat dose after 2 hours. Maximum: 200 mg per 24 hours 16 tablet 2    vibegron (GEMTESA) 75 mg Tab Take 1 tablet (75 mg total) by mouth once daily. 30 tablet 11    zolpidem (AMBIEN) 10 mg Tab Take 10 mg by mouth nightly as needed.       No current facility-administered medications for this visit.   [2]   Social History  Socioeconomic History    Marital status:    Tobacco Use    Smoking status: Former     Average packs/day: 2.0 packs/day for 30.0 years (60.0 ttl pk-yrs)     Types: Cigarettes     Start date:      Quit date:      Years since quittin.5     " Passive exposure: Current    Smokeless tobacco: Never    Tobacco comments:     Quit 2 years ago    Substance and Sexual Activity    Alcohol use: Not Currently     Comment: Quit 7 years ago    Drug use: Not Currently     Comment: Quit 7 years ago.    Sexual activity: Not Currently     Social Drivers of Health     Financial Resource Strain: Unknown (9/12/2019)    [facility]    Overall Financial Resource Strain (CARDIA)     Difficulty of Paying Living Expenses: Patient declined   Food Insecurity: Unknown (9/12/2019)    [facility]    Hunger Vital Sign     Worried About Running Out of Food in the Last Year: Patient declined     Ran Out of Food in the Last Year: Patient declined   Transportation Needs: Unknown (9/12/2019)    [facility]    PRAPARE - Transportation     Lack of Transportation (Medical): Patient declined     Lack of Transportation (Non-Medical): Patient declined   Physical Activity: Unknown (9/12/2019)    Received [facility]    Exercise Vital Sign     Days of Exercise per Week: Patient declined     Minutes of Exercise per Session: Patient declined   Stress: Stress Concern Present (9/12/2019)    [facility]    [employer] [place] [employer] - Occupational Stress Questionnaire     Feeling of Stress : To some extent

## 2025-07-17 ENCOUNTER — TELEPHONE (OUTPATIENT)
Dept: NEUROLOGY | Facility: CLINIC | Age: 55
End: 2025-07-17
Payer: MEDICARE

## 2025-07-25 ENCOUNTER — OFFICE VISIT (OUTPATIENT)
Dept: NEUROLOGY | Facility: CLINIC | Age: 55
End: 2025-07-25
Payer: MEDICARE

## 2025-07-25 VITALS
HEART RATE: 73 BPM | DIASTOLIC BLOOD PRESSURE: 91 MMHG | TEMPERATURE: 98 F | SYSTOLIC BLOOD PRESSURE: 151 MMHG | HEIGHT: 72 IN | OXYGEN SATURATION: 97 % | BODY MASS INDEX: 38.85 KG/M2 | RESPIRATION RATE: 18 BRPM | WEIGHT: 286.81 LBS

## 2025-07-25 DIAGNOSIS — G43.E01 CHRONIC MIGRAINE WITH AURA, NOT INTRACTABLE, WITH STATUS MIGRAINOSUS: Primary | Chronic | ICD-10-CM

## 2025-07-25 DIAGNOSIS — G47.33 OSA (OBSTRUCTIVE SLEEP APNEA): Chronic | ICD-10-CM

## 2025-07-25 DIAGNOSIS — E66.813 CLASS 3 SEVERE OBESITY WITH SERIOUS COMORBIDITY AND BODY MASS INDEX (BMI) OF 40.0 TO 44.9 IN ADULT: Chronic | ICD-10-CM

## 2025-07-25 PROCEDURE — 99215 OFFICE O/P EST HI 40 MIN: CPT | Mod: PBBFAC | Performed by: NURSE PRACTITIONER

## 2025-07-25 RX ORDER — ATOGEPANT 60 MG/1
60 TABLET ORAL DAILY
Qty: 30 TABLET | Refills: 4 | Status: SHIPPED | OUTPATIENT
Start: 2025-07-25 | End: 2026-07-25
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
## (undated) DEVICE — BLADE SURG STAINLESS STEEL #15

## (undated) DEVICE — SYR 30CC LUER LOCK

## (undated) DEVICE — SUT VICRYL 1 OB 36 CTX

## (undated) DEVICE — SPONGE X-RAY LAP DETCT 18X18IN

## (undated) DEVICE — KIT CHECKPOINT TIBIAL

## (undated) DEVICE — SOL 1L ACD-A

## (undated) DEVICE — DRAPE MEDIUM SHEET 40X70IN

## (undated) DEVICE — SOL NACL IRR 1000ML BTL

## (undated) DEVICE — PILLOW ABDUCTION FOAM MED

## (undated) DEVICE — TAPE POROUS 3 IN 10 YD WHITE

## (undated) DEVICE — RETRIEVER SUTURE HEWSON DISP

## (undated) DEVICE — SUT 1 36IN PDS II VIO MONO

## (undated) DEVICE — KIT TRACKING VIZADISC HIP

## (undated) DEVICE — ELECTRODE PATIENT RETURN DISP

## (undated) DEVICE — BLADE SAW SAG 22.13MM 0.92MM

## (undated) DEVICE — DRAPE STERI U-SHAPED 47X51IN

## (undated) DEVICE — TAPE ADH MEDIPORE 4 X 10YDS

## (undated) DEVICE — DRAPE FULL SHEET 70X100IN

## (undated) DEVICE — SUT VICRYL 2-0 36 CT-1

## (undated) DEVICE — COVER HD BACK TABLE 6FT

## (undated) DEVICE — SPONGE COTTON TRAY 4X4IN

## (undated) DEVICE — TOWEL OR DISP STRL BLUE 4/PK

## (undated) DEVICE — SOL POVIDONE IODINE PCH 3/4OZ

## (undated) DEVICE — GLOVE SENSICARE PI MICRO 8

## (undated) DEVICE — PIN BONE 4 X 140MM STERILE
Type: IMPLANTABLE DEVICE | Site: HIP | Status: NON-FUNCTIONAL
Removed: 2024-04-09

## (undated) DEVICE — PAD ABDOMINAL STERILE 8X10IN

## (undated) DEVICE — KIT TOTAL HIP HLGC

## (undated) DEVICE — STRIP MEDI WND CLSR 1/2X4IN

## (undated) DEVICE — APPLICATOR CHLORAPREP ORN 26ML

## (undated) DEVICE — KIT SURGICAL TURNOVER

## (undated) DEVICE — GLOVE SENSICARE PI GRN 8

## (undated) DEVICE — KIT C.A.T.S. FAST START

## (undated) DEVICE — SUT PDS PLUS 1 TP1 96IN

## (undated) DEVICE — GOWN POLY REINF X-LONG 2XL

## (undated) DEVICE — HOOD FLYTE SURGICOOL

## (undated) DEVICE — SOL NACL IRR 3000ML

## (undated) DEVICE — SUT MCRYL PLUS 4-0 PS2 27IN

## (undated) DEVICE — KIT DRAPE RIO ONE PIECE W/POCK